# Patient Record
Sex: MALE | Race: WHITE
[De-identification: names, ages, dates, MRNs, and addresses within clinical notes are randomized per-mention and may not be internally consistent; named-entity substitution may affect disease eponyms.]

---

## 2022-08-20 ENCOUNTER — HOSPITAL ENCOUNTER (EMERGENCY)
Dept: HOSPITAL 97 - ER | Age: 67
Discharge: HOME | End: 2022-08-20
Payer: COMMERCIAL

## 2022-08-20 VITALS — SYSTOLIC BLOOD PRESSURE: 118 MMHG | OXYGEN SATURATION: 99 % | DIASTOLIC BLOOD PRESSURE: 54 MMHG

## 2022-08-20 VITALS — TEMPERATURE: 97.1 F

## 2022-08-20 DIAGNOSIS — Z95.5: ICD-10-CM

## 2022-08-20 DIAGNOSIS — I10: ICD-10-CM

## 2022-08-20 DIAGNOSIS — E11.9: ICD-10-CM

## 2022-08-20 DIAGNOSIS — R79.89: ICD-10-CM

## 2022-08-20 DIAGNOSIS — E87.5: ICD-10-CM

## 2022-08-20 DIAGNOSIS — I95.89: Primary | ICD-10-CM

## 2022-08-20 DIAGNOSIS — Z98.890: ICD-10-CM

## 2022-08-20 DIAGNOSIS — Z95.1: ICD-10-CM

## 2022-08-20 DIAGNOSIS — R55: ICD-10-CM

## 2022-08-20 LAB
ALBUMIN SERPL BCP-MCNC: 2.4 G/DL (ref 3.4–5)
ALP SERPL-CCNC: 65 U/L (ref 45–117)
ALT SERPL W P-5'-P-CCNC: 20 U/L (ref 12–78)
AST SERPL W P-5'-P-CCNC: 11 U/L (ref 15–37)
BUN BLD-MCNC: 31 MG/DL (ref 7–18)
GLUCOSE SERPLBLD-MCNC: 96 MG/DL (ref 74–106)
HCT VFR BLD CALC: 25 % (ref 39.6–49)
INR BLD: 1.09
LYMPHOCYTES # SPEC AUTO: 0.8 K/UL (ref 0.7–4.9)
MAGNESIUM SERPL-MCNC: 2 MG/DL (ref 1.8–2.4)
MCV RBC: 83.4 FL (ref 80–100)
NT-PROBNP SERPL-MCNC: 743 PG/ML (ref ?–125)
PMV BLD: 7.6 FL (ref 7.6–11.3)
POTASSIUM SERPL-SCNC: 5.4 MMOL/L (ref 3.5–5.1)
RBC # BLD: 2.99 M/UL (ref 4.33–5.43)
TROPONIN I SERPL HS-MCNC: 11.6 PG/ML (ref ?–58.9)

## 2022-08-20 PROCEDURE — 71045 X-RAY EXAM CHEST 1 VIEW: CPT

## 2022-08-20 PROCEDURE — 99284 EMERGENCY DEPT VISIT MOD MDM: CPT

## 2022-08-20 PROCEDURE — 84145 PROCALCITONIN (PCT): CPT

## 2022-08-20 PROCEDURE — 93005 ELECTROCARDIOGRAM TRACING: CPT

## 2022-08-20 PROCEDURE — 83605 ASSAY OF LACTIC ACID: CPT

## 2022-08-20 PROCEDURE — 80076 HEPATIC FUNCTION PANEL: CPT

## 2022-08-20 PROCEDURE — 81015 MICROSCOPIC EXAM OF URINE: CPT

## 2022-08-20 PROCEDURE — 36415 COLL VENOUS BLD VENIPUNCTURE: CPT

## 2022-08-20 PROCEDURE — 83880 ASSAY OF NATRIURETIC PEPTIDE: CPT

## 2022-08-20 PROCEDURE — 84484 ASSAY OF TROPONIN QUANT: CPT

## 2022-08-20 PROCEDURE — 85025 COMPLETE CBC W/AUTO DIFF WBC: CPT

## 2022-08-20 PROCEDURE — 85610 PROTHROMBIN TIME: CPT

## 2022-08-20 PROCEDURE — 83735 ASSAY OF MAGNESIUM: CPT

## 2022-08-20 PROCEDURE — 81003 URINALYSIS AUTO W/O SCOPE: CPT

## 2022-08-20 PROCEDURE — 80048 BASIC METABOLIC PNL TOTAL CA: CPT

## 2022-08-20 NOTE — RAD REPORT
EXAM DESCRIPTION:  RAD - Chest Single View - 8/20/2022 1:29 pm

 

CLINICAL HISTORY:  syncope, hypotension

 

COMPARISON:  None

 

TECHNIQUE:  AP portable chest image was obtained 8/20/2022 1:29 pm .

 

FINDINGS:  Lungs are clear. Heart and vasculature are normal. CABG surgical changes are noted. Severa
l coronary artery stents are visible. No measurable pleural effusion and no pneumothorax. No acute jannette
ny abnormality seen. No acute aortic findings suspected.

 

IMPRESSION:  No acute cardiopulmonary process.

## 2022-08-20 NOTE — XMS REPORT
Continuity of Care Document

                           Created on:2022



Patient:ROXANNE SOTOMAYOR

Sex:Male

:1955

External Reference #:[id number]





Demographics







                          Address                   [address]



                                                    [place] [address]

 

                          Home Phone                [phone]

 

                          Mobile Phone              [phone]

 

                          Email Address             [email or web address]

 

                          Preferred Language        English

 

                          Marital Status            Unknown

 

                          Judaism Affiliation     Unknown

 

                          Race                      Unknown

 

                          Additional Race(s)        Unavailable



                                                    Other Race

 

                          Ethnic Group              Unknown









Author







                          Organization              [facility]

t

 

                          Address                   [address]



                                                    [place] [address]

 

                          Phone                     [phone]









Support







                Name            Relationship    Address         Phone

 

                KATHRYN LOZOYA               [address]   Unavailable



                                                [place] [phone] 

 

                MIKE MATHUR               Unavailable     [phone]

[phone]

 

                Kathryn Desir               [address]   [phone]

[phone]



                                                [place] [address] 

 

                Unavailable     Unavailable     Unavailable     Unavailable









Care Team Providers







                    Name                Role                Phone

 

                    TYSONMICHAEL HENDRICKS     Primary Care Physician Unavailable

 

                    PASHA WILDER        Attending Clinician Unavailable

 

                    YODIT HORN      Attending Clinician Unavailable

 

                    ELIZABETH ESCALERA Attending Clinician Unavailable

 

                    Bui_Q               Attending Clinician Unavailable

 

                    [email or web address]        Attending Clinician Unavailable

 

                    Blanche Rosenberg MD Attending Clinician [phone]

 

                    PASHA WILDER M.D.  Attending Clinician Unavailable

 

                    CLINT PALOMO M.D. Attending Clinician Unavailable

 

                    DOMO AGUIAR Attending Clinician Unavailable

 

                    CECIL ISIDRO Attending Clinician Unavailable

 

                    ANGEL LOREDO Admitting Clinician Unavailable

 

                    Bui_Q               Admitting Clinician Unavailable

 

                    [id number]        Admitting Clinician Unavailable

 

                    LEELA BERRIOS Admitting Clinician Unavailable

 

                    CORNELIO FRAUSTO      Admitting Clinician Unavailable









Payers







           Payer Name Policy Type Policy Number Effective Date Expiration Date GLADYS whatley

 

           WELLCARE/WELLCARE            [id number]   2020            



           [employer]                        00:00:00              

 

           WELLCARE [facility]            [id number]   2020            



                                            00:00:00              

 

           [facility] NT            [id number] 2022            



                                            00:00:00              

 

           WELLCARE [facility]            [id number]   2020            



           [employer] (MEDICARE                       00:00:00              



           REPLACEMENT/ADVANTA                                             



           GE - HMO)                                              

 

           TRANSACT RX (MOVED            [id number]                           



           HOLD)                                                  

 

           [place]            [id number]   2020            



           [employer]                       00:00:00              



           (MEDICARE                                              



           REPLACEMENT HMO)                                             







Problems







       Condition Condition Condition Status Onset  Resolution Last   Treating Co

mments 

Source



       Name   Details Category        Date   Date   Treatment Clinician        



                                                 Date                 

 

       Effusion Effusion Disease Active                              [facility]



       of knee of knee               6-22                               Health



       joint, joint,               00:00:                             



       left   left                 00                                 

 

       Effusion Effusion Disease Active                              [facility]



       of knee of knee               6-22                               Health



       joint  joint                00:00:                             



       right  right                00                                 

 

       Right hip Right hip Disease Active 2021                             [facility]



       pain   pain                 2-10                               Health



                                   00:00:                             



                                   00                                 

 

       Trochanter Trochanter Disease Active 2021                             [facility]

T



       ic     ic                   2-10                               Health



       bursitis bursitis               00:00:                             



       of right of right               00                                 



       hip    hip                                                     

 

       Primary Primary Disease Active 2021                             [facility]



       osteoarthr osteoarthr               2-10                               He

alth



       itis of itis of               00:00:                             



       right hip right hip               00                                 

 

       Class 1 Class 1 Disease Active 2021                             [facility]



       obesity obesity               2-10                               Health



       due to due to               00:00:                             



       excess excess               00                                 



       calories calories                                                  



       with   with                                                    



       serious serious                                                  



       comorbidit comorbidit                                                  



       y and body y and body                                                  



       mass index mass index                                                  



       (BMI) of (BMI) of                                                  



       34.0 to 34.0 to                                                  



       34.9 in 34.9 in                                                  



       adult  adult                                                   

 

       Primary Primary Disease Active 2021                             [facility]



       osteoarthr osteoarthr               0-15                               He

alth



       itis of itis of               00:00:                             



       both knees both knees               00                                 

 

       Acquired Acquired Disease Active 2021                             [facility]



       varus  varus                0-15                               Health



       deformity deformity               00:00:                             



       knee,  knee,                00                                 



       right  right                                                   

 

       Acquired Acquired Disease Active 2021                             [facility]



       varus  varus                0-15                               Health



       deformity deformity               00:00:                             



       knee, left knee, left               00                                 

 

       Limp   Limp   Disease Active 2021                             [facility]



                                   0-15                               Health



                                   00:00:                             



                                   00                                 

 

       Acute pain Acute pain Disease Active 2021                             [facility]

T



       of left of left               0-15                               Health



       knee   knee                 00:00:                             



                                   00                                 

 

       Dyslipidem Dyslipidem Problem Active                              [facility]

[facility]



       ia     ia                   6-04                               Family



                                   00:00:                             [facility]



                                   00                                 e

 

       Morbid Morbid Problem Active                              [place]



       obesity Obesity               3-18                               Family



                                   00:00:                             [facility]ractic



                                   00                                 e

 

       Diabetes Diabetes Problem Active 2021-0                             [place]

[facility]



       mellitus Mellitus               3-03                               Family



                                   00:00:                             Practic



                                   00                                 e

 

       Angina Angina Problem Active                              [facility]



       co-occurre Co-occurre               1-19                               Fa

solomon



       nt and due nt and Due               00:00:                             Pr

actic



       to     to                   00                                 e



       coronary Coronary                                                  



       arterioscl Arterioscl                                                  



       erosis erosis                                                  

 

       Chronic Chronic Problem Active                              [facility]



       kidney Kidney               9-12                               Family



       disease Disease               00:00:                             [facility]



       stage 3 Stage 3               00                                 e

 

       Acute  Acute  Disease Active                              [facility]



       coronary coronary               8                               [facility]



       syndrome syndrome               00:00:                             [facility]

l



                                   00                                 Center

 

       Cobalamin Cobalamin Problem Active 2019                             [facility]

[place]



       deficiency Deficiency               1-12                               

[facility]



                                   00:00:                             [facility]



                                   00                                 e

 

       Peripheral Peripheral Problem Active 2019                             [facility]

[facility]



       vascular Vascular               1-12                               Family



       disease Disease               00:00:                             [facility]



                                   00                                 e

 

       Type 2 Type 2 Problem Active 2019                             [facility]



       diabetes Diabetes               1-01                               Family



       mellitus Mellitus               00:00:                             [facility]



       with   with                 00                                 e



       peripheral Peripheral                                                  



       angiopathy Angiopathy                                                  

 

       Neuropathy Neuropathy Problem Active                              [facility]

[facility]



       due to Due to               9-10                               Family



       diabetes Diabetes               00:00:                             [facility]



       mellitus Mellitus               00                                 e

 

       Anemia Anemia Problem Active                              [facility]



                                   9-10                               Family



                                   00:00:                             [facility]



                                   00                                 e

 

       Obstructiv Obstructiv Problem Active                              [facility]

[facility]



       e sleep e Sleep               9-10                               Family



       apnea  Apnea                00:00:                             [facility]



       syndrome Syndrome               00                                 e

 

       Hypertensi Hypertensi Problem Active                              [facility]

[facility]



       ve     ve                   9-10                               Family



       disorder Disorder               00:00:                             Pracpj

c



                                   00                                 e

 

       Gastroesop Gastroesop Problem Active                              [facility]

[facility]



       hageal hageal               9-10                               Family



       reflux Reflux               00:00:                             [facility]



       disease Disease               00                                 e



       without without                                                  



       esophagiti Esophagiti                                                  



       s      s                                                       

 

       Lumbar Lumbar Problem Active                              [facility]



       radiculopa Radiculopa               9-10                               

[facility]



       thy    thy                  00:00:                             [facility]



                                   00                                 e

 

       History of History of Problem Active                              [facility]

[facility]



       cerebrovas [facility]               9-10                               [facility]



       cular  cular                00:00:                             [facility]



       accident Accident               00                                 e

 

       Unstable Unstable Disease Active                              [facility]

[facility]



       angina angina               7-06                               [facility]



                                   [facility]                             [facility]



                                   [facility]                                 [facility]

 

       History of History of Problem Resolve                                    

[facility]



       hypertensi hypertensi        d                                         Ph

ysici



       on     on                                                      ans

 

       History of History of Problem Resolve                                    

[facility]



       diabetes diabetes        d                                         Physic

i



       mellitus mellitus                                                  ans

 

       History of History of Problem Resolve                                    

[facility]



       heart  heart         d                                         Physici



       attack attack                                                  ans

 

       Bilateral Bilateral Problem Active                                    [facility]



       knee pain knee pain                                                  Phys

ici



                                                                      ans

 

       Primary Primary Problem Active                                    [facility]



       localized localized                                                  Phys

ici



       osteoarthr osteoarthr                                                  an

s



       itis of itis of                                                  



       knees, knees,                                                  



       bilateral bilateral                                                  

 

       Acquired Acquired Problem Active                                    [facility]



       genu varum genu varum                                                  Ph

ysici



       of both of both                                                  ans



       lower  lower                                                   



       extremitie extremitie                                                  



       s      s                                                       

 

       Acquired Acquired Problem Active                                    [facility]



       genu   genu                                                    Physici



       varum, varum,                                                  ans



       left   left                                                    

 

       Abnormal Abnormal Problem Active                                    [facility]



       accelerati accelerati                                                  Ph

ysici



       ng of gait ng of gait                                                  an

s

 

       Acute pain Acute pain Problem Active                                    [facility]

T



       of left of left                                                  Physici



       knee   knee                                                    ans

 

       Osteoarthr Osteoarthr Problem Active                                    [facility]

[facility]



       itis of itis of                                                  Family



       knee   Knee                                                    [facility]



                                                                      e







Allergies, Adverse Reactions, Alerts







       Allergy Allergy Status Severity Reaction(s) Onset  Inactive Treating Comm

ents 

Source



       Name   Type                        Date   Date   Clinician        

 

       NO KNOWN Drug   Active                                           [facility]



       ALLERGIE Class                                                   ity of



       [facility]

 

       NO KNOWN Allergy Active                                           [facility]



       ALLERGIE                                                         



       S                                                              







Family History







           Family Member Diagnosis  Comments   Start Date Stop Date  Source

 

           Unknown Family Family history of Family History                      

 [facility]



           Member     Heart trouble                                  

 

           Unknown Family Family history of Family History                      

 [facility]



           Member     diabetes mellitus                                  

 

           Unknown Family Family history of Family History                      

 [facility]



           Member     hypertension                                  







Social History







           Social Habit Start Date Stop Date  Quantity   Comments   Source

 

           History [facility]



           Alcohol Std Drinks                                             

 

           History [facility]



           Alcohol Binge                                             

 

           History [facility]



           Alcohol Comment                                             

 

           History of tobacco                       Cigarette Smoker            

[facility]



           use                                                    

 

           Exposure to 2022 Not sure              [facility]



           SARS-CoV-2 (event) 00:00:00   09:55:00                         

 

           Tobacco use and 2022 Smokeless tobacco            [facility]



           exposure   00:00:00   00:00:00   non-user              

 

           Alcohol intake 2022 Lifetime              [facility]



                      00:00:00   00:00:00   non-drinker            



                                            (finding)             

 

           Cigarette  2022                       [facility]



           pack-years 00:00:00   00:00:00                         

 

           History SDOH 2021-10-15 2021-10-15 1                     [facility]



           Alcohol Frequency 00:00:00   00:00:00                         

 

           Sex Assigned At 1955                       [facility]



           Birth      00:00:00   00:00:00                         









                Smoking Status  Start Date      Stop Date       Source

 

                [facility] if ever smoked                                 [facility]

 

                Never smoked tobacco                                 [facility]







Medications







       Ordered Filled Start  Stop   Current Ordering Indication Dosage Frequency

 Signature

                    Comments            Components          Source



     Medication Medication Date Date Medication? Clinician                (SIG) 

          



     Name Name                                                   

 

     bupivacaine      2021- No        [id number] 1mL                    

  [facility]



     (Marcaine)      2-10 12-10           9100                          Health



     0.25 %      19:22: 19:22                                         



     injection 1      48   :00                                          



     mL                                                          

 

     lidocaine      2021- No        [id number] 1mL                      

[facility]



     (Xylocaine)      2-10 12-10           9100                          Health



     1 %       19:22: 19:22                                         



     injection 1      48   :00                                          



     mL                                                          

 

     triamcinolo      2021- No        [id number] 10mg                   

  [facility]



     ne        2-10 12-10           9100                          Health



     acetonide      19:22: 19:22                                         



     (Kenalog)      48   :00                                          



     10 MG/ML                                                        



     injection                                                        



     10 mg                                                        

 

     triamcinolo      2021- No        [id number] 10mg      10 mg,       

    [facility]



     ne        2-10 12-10           9100           Intra-mya           Health



     acetonide      19:22: 19:22                          cular,           



     (Kenalog)      48   :00                           Once PRN           



     10 MG/ML                                         Procedure,           



     injection                                         Starting           



     10 mg                                         on Fri           



                                                  12/10/21           



                                                  at 1322,           



                                                  For 1 dose           

 

     lidocaine      2021- No        [id number] 1mL       1 mL,          

 [facility]



     (Xylocaine)      2-10 12-10           9100           Injection,           H

ealth



     1 %       19:22: 19:22                          Once PRN           



     injection 1      48   :00                           Procedure,           



     mL                                           Starting           



                                                  on Fri           



                                                  12/10/21           



                                                  at 1322,           



                                                  For 1 dose           

 

     bupivacaine      2021- No        [id number] 1mL       1 mL,        

   [facility]



     (Marcaine)      2-10 12-10           9100           Injection,           [facility]

[facility]



     0.25 %      19:22: 19:22                          Once PRN           



     injection 1      48   :00                           Procedure,           



     mL                                           Starting           



                                                  on Fri           



                                                  12/10/21           



                                                  at 1322,           



                                                  For 1 dose           

 

     Diclofenac      2021- No        [id number]      [id number] Apply   

        [facility]



     Sodium      2-10 01-10           9102      [id number] topically           [facility]

e[facility]



     (Voltaren)      00:00: 05:59                     3D   3 (three)           



     1 %       00   :00                           times a           



     external                                         day if           



     gel                                          needed           



                                                  (pain).           



                                                  APPLY 4           



                                                  GRAMS DONT           



                                                  EXCEED 16           



                                                  QD             

 

     Sodium      2021- No        [id number] 20mg                     [facility]



     Hyaluronate                                               [facility]



     solution      15:38: 15:38                                         



     prefilled      48   :00                                          



     syringe 20                                                        



     mg                                                          

 

     Sodium      2021- No        [id number] 20mg                     [facility]



     Hyaluronate                                               [facility]



     solution      15:38: 15:38                                         



     prefilled      48   :00                                          



     syringe 20                                                        



     mg                                                          

 

     Sodium      2021- No        [id number] 20mg      20 mg,           [facility]



     Hyaluronate                                Intra-mya           [facility]

ealth



     solution      15:38: 15:38                          cular,           



     prefilled      48   :00                           Once PRN           



     syringe 20                                         Procedure,           



     mg                                           Starting           



                                                  on 21           



                                                  at 0938,           



                                                  For 1 dose           

 

     Sodium      2021- No        [id number] 20mg      20 mg,           [facility]



     Hyaluronate                                Intra-mya           [facility]

ealth



     solution      15:38: 15:38                          cular,           



     prefilled      48   :00                           Once PRN           



     syringe 20                                         Procedure,           



     mg                                           Starting           



                                                  on 21           



                                                  at 0938,           



                                                  For 1 dose           

 

     Sodium      2021- No        [id number] 20mg                     [facility]



     Hyaluronate                                               Health



     solution      15:15: 15:15                                         



     prefilled      27   :00                                          



     syringe 20                                                        



     mg                                                          

 

     Sodium      2021- No        [id number] 20mg                     [facility]



     Hyaluronate                                               [facility]



     solution      15:15: 15:15                                         



     prefilled      27   :00                                          



     syringe 20                                                        



     mg                                                          

 

     Sodium      2021- No        [id number] 20mg      20 mg,           [facility]



     Hyaluronate                                Intra-mya           [facility]

e[facility]



     solution      15:15: 15:15                          cular,           



     prefilled      27   :00                           Once PRN           



     syringe 20                                         Procedure,           



     mg                                           Starting           



                                                  on 21           



                                                  at 0915,           



                                                  For 1 dose           

 

     Sodium      2021- No        [id number] 20mg      20 mg,           [facility]



     Hyaluronate                                Intra-mya           [facility]

e[facility]



     solution      15:15: 15:15                          cular,           



     prefilled      27   :00                           Once PRN           



     syringe 20                                         Procedure,           



     mg                                           Starting           



                                                  on 21           



                                                  at 0915,           



                                                  For 1 dose           

 

     Sodium      2021- No        [id number] 20mg                     [facility]



     Hyaluronate                                               Health



     solution      14:39: 14:39                                         



     prefilled      41   :00                                          



     syringe 20                                                        



     mg                                                          

 

     Sodium      2021- No        [id number] 20mg                     [facility]



     Hyaluronate                                               Health



     solution      14:39: 14:39                                         



     prefilled      41   :00                                          



     syringe 20                                                        



     mg                                                          

 

     Sodium      2021- No        [id number] 20mg      20 mg,           [facility]



     Hyaluronate                                Intra-mya           [facility]

e[facility]



     solution      14:39: 14:39                          cular,           



     prefilled      41   :00                           Once PRN           



     syringe 20                                         Procedure,           



     mg                                           Starting           



                                                  on 21 at           



                                                  0939, For           



                                                  1 dose           

 

     Sodium      2021- No        [id number] 20mg      20 mg,           [facility]



     Hyaluronate                                Intra-mya           [facility]

e[facility]



     solution      14:39: 14:39                          cular,           



     prefilled      41   :00                           Once PRN           



     syringe 20                                         Procedure,           



     mg                                           Starting           



                                                  on 21 at           



                                                  0939, For           



                                                  1 dose           

 

     pregabalin      2021      Yes                      pregabalin           [facility]

[facility]



     (Lyrica) 50      0-15                               50 mg           Health



     MG capsule      09:07:                               capsule TK           



               56                                 1 C PO QD.           

 

     sAXagliptin      2021      Yes                 QD   1 (one)           [facility]



     (Onglyza) 5      0-15                               time each           [facility]

[facility]



     MG tablet      09:07:                               day.           



               56                                                

 

     pregabalin      2021      Yes                      pregabalin           [facility]

T



     (Lyrica) 50      0-15                               50 mg           [facility]



     MG capsule      09:07:                               capsule TK           



               56                                 1 C PO QD.           

 

     sAXagliptin      2021      Yes                 QD   1 (one)           [facility]



     (Onglyza) 5      0-15                               time each           [facility]

[facility]



     MG tablet      09:07:                               day.           



               56                                                

 

     pregabalin      2021      Yes                      pregabalin           [facility]

T



     (Lyrica) 50      0-15                               50 mg           [facility]



     MG capsule      09:07:                               capsule TK           



               56                                 1 C PO QD.           

 

     sAXagliptin      2021      Yes                 QD   1 (one)           [facility]



     (Onglyza) 5      0-15                               time each           [facility]

[facility]



     MG tablet      09:07:                               day.           



               56                                                

 

     pregabalin      2021      Yes                      pregabalin           [facility]

T



     (Lyrica) 50      0-15                               50 mg           [facility]



     MG capsule      09:07:                               capsule TK           



               56                                 1 C PO QD.           

 

     sAXagliptin      2021      Yes                 QD   1 (one)           [facility]



     (Onglyza) 5      0-15                               time each           [facility]

[facility]



     MG tablet      09:07:                               day.           



               56                                                

 

     pregabalin      2021      Yes                      pregabalin           [facility]

T



     (Lyrica) 50      0-15                               50 mg           [facility]



     MG capsule      09:07:                               capsule TK           



               56                                 1 C PO QD.           

 

     sAXagliptin      2021      Yes                 QD   1 (one)           [facility]



     (Onglyza) 5      0-15                               time each           [facility]

[facility]



     MG tablet      09:07:                               day.           



               56                                                

 

     pregabalin      2021      Yes                      pregabalin           [facility]

T



     (Lyrica) 50      0-15                               50 mg           [facility]



     MG capsule      09:07:                               capsule TK           



               56                                 1 C PO QD.           

 

     sAXagliptin      2021      Yes                 QD   1 (one)           [facility]



     (Onglyza) 5      0-15                               time each           [facility]

[facility]



     MG tablet      09:07:                               day.           



               56                                                

 

     pregabalin      2021      Yes                      pregabalin           [facility]

T



     (Lyrica) 50      0-15                               50 mg           [facility]



     MG capsule      09:07:                               capsule TK           



               56                                 1 C PO QD.           

 

     sAXagliptin      2021      Yes                 QD   1 (one)           [facility]



     (Onglyza) 5      0-15                               time each           [facility]

[facility]



     MG tablet      09:07:                               day.           



               56                                                

 

     empaglifloz      2021      Yes                 QD   1 (one)           [facility]



     in        0-15                               time each           [facility]



     (Jardiance)      09:07:                               day.           



     25 MG      55                                                

 

     glipiZIDE      2021      Yes                      glipizide           [facility]



     XL        0-15                               ER 10 mg           [facility]



     (Glucotrol      09:07:                               tablet,           



     XL) 10 MG      55                                 extended           



     24 hr                                         release 24           



     tablet                                         hr Take 1           



                                                  tablet           



                                                  every day           



                                                  by oral           



                                                  route.           

 

     insulin      2021      Yes                      Lantus           [facility]



     glargine      0-15                               [facility]ar           [facility]



     (Lantus      09:07:                               U-100           



     SoloStar)      55                                 Insulin           



     100 UNIT/ML                                         100            



     injection                                         unit/mL (3           



                                                  mL)            



                                                  subcutaneo           



                                                  us pen per           



                                                  endo 50           



                                                  units           



                                                  twice a           



                                                  day            

 

     isosorbide      2021      Yes                      isosorbide           [facility]

T



     mononitrate      0-15                               mononitrat           [facility]

[facility]



     ER (Imdur)      09:07:                               e ER 30 mg           



     30 MG 24 hr      55                                 tablet,ext           



     tablet                                         ended           



                                                  release 24           



                                                  hr             

 

     metFORMIN      2021      Yes                      metformin           [facility]



     (Glucophage      0-15                               1,000 mg           [facility]

[facility]



     ) 1000 MG      09:07:                               tablet           



     tablet      55                                                

 

     empaglifloz      2021      Yes                 QD   1 (one)           [facility]



     in        0-15                               time each           [facility]



     (Jardiance)      09:07:                               day.           



     25 MG      55                                                

 

     glipiZIDE      2021      Yes                      glipizide           [facility]



     XL        0-15                               ER 10 mg           [facility]



     (Glucotrol      09:07:                               tablet,           



     XL) 10 MG      55                                 extended           



     24 hr                                         release 24           



     tablet                                         hr Take 1           



                                                  tablet           



                                                  every day           



                                                  by oral           



                                                  route.           

 

     insulin      2021      Yes                      Lantus           [facility]



     glargine      0-15                               [facility]           [facility]



     (Lantus      09:07:                               U-100           



     SoloStar)      55                                 Insulin           



     100 UNIT/ML                                         100            



     injection                                         unit/mL (3           



                                                  mL)            



                                                  subcutaneo           



                                                  us pen per           



                                                  endo 50           



                                                  units           



                                                  twice a           



                                                  day            

 

     isosorbide      2021      Yes                      isosorbide           [facility]

T



     mononitrate      0-15                               mononitrat           [facility]

[facility]



     ER (Imdur)      09:07:                               e ER 30 mg           



     30 MG 24 hr      55                                 tablet,ext           



     tablet                                         ended           



                                                  release 24           



                                                  hr             

 

     metFORMIN      2021      Yes                      metformin           [facility]



     (Glucophage      0-15                               1,000 mg           [facility]

[facility]



     ) 1000 MG      09:07:                               tablet           



     tablet      55                                                

 

     empaglifloz      2021      Yes                 QD   1 (one)           [facility]



     in        0-15                               time each           [facility]



     (Jardiance)      09:07:                               day.           



     25 MG      55                                                

 

     glipiZIDE      2021      Yes                      glipizide           [facility]



     XL        0-15                               ER 10 mg           [facility]



     (Glucotrol      09:07:                               tablet,           



     XL) 10 MG      55                                 extended           



     24 hr                                         release 24           



     tablet                                         hr Take 1           



                                                  tablet           



                                                  every day           



                                                  by oral           



                                                  route.           

 

     insulin      2021      Yes                      Lantus           [facility]



     glargine      0-15                               [facility]           [facility]



     (Lantus      09:07:                               U-100           



     SoloStar)      55                                 Insulin           



     100 UNIT/ML                                         100            



     injection                                         unit/mL (3           



                                                  mL)            



                                                  subcutaneo           



                                                  us pen per           



                                                  endo 50           



                                                  units           



                                                  twice a           



                                                  day            

 

     isosorbide      2021      Yes                      isosorbide           [facility]

T



     mononitrate      0-15                               mononitrat           [facility]

[facility]



     ER (Imdur)      09:07:                               e ER 30 mg           



     30 MG 24 hr      55                                 tablet,ext           



     tablet                                         ended           



                                                  release 24           



                                                  hr             

 

     metFORMIN      2021      Yes                      metformin           [facility]



     (Glucophage      0-15                               1,000 mg           [facility]

[facility]



     ) 1000 MG      09:07:                               tablet           



     tablet      55                                                

 

     empaglifloz      2021      Yes                 QD   1 (one)           [facility]



     in        0-15                               time each           [facility]



     (Jardiance)      09:07:                               day.           



     25 MG      55                                                

 

     glipiZIDE      2021      Yes                      glipizide           [facility]



     XL        0-15                               ER 10 mg           [facility]



     (Glucotrol      09:07:                               tablet,           



     XL) 10 MG      55                                 extended           



     24 hr                                         release 24           



     tablet                                         hr Take 1           



                                                  tablet           



                                                  every day           



                                                  by oral           



                                                  route.           

 

     insulin      2021      Yes                      Lantus           [facility]



     glargine      0-15                               [facility]           [facility]



     (Lantus      09:07:                               U-100           



     SoloStar)      55                                 Insulin           



     100 UNIT/ML                                         100            



     injection                                         unit/mL (3           



                                                  mL)            



                                                  subcutaneo           



                                                  us pen per           



                                                  endo 50           



                                                  units           



                                                  twice a           



                                                  day            

 

     isosorbide      2021      Yes                      isosorbide           [facility]

T



     mononitrate      0-15                               mononitrat           [facility]

[facility]



     ER (Imdur)      09:07:                               e ER 30 mg           



     30 MG 24 hr      55                                 tablet,ext           



     tablet                                         ended           



                                                  release 24           



                                                  hr             

 

     metFORMIN      2021      Yes                      metformin           [facility]



     (Glucophage      0-15                               1,000 mg           [facility]

[facility]



     ) 1000 MG      09:07:                               tablet           



     tablet      55                                                

 

     empaglifloz      2021      Yes                 QD   1 (one)           [facility]



     in        0-15                               time each           [facility]



     (Jardiance)      09:07:                               day.           



     25 MG      55                                                

 

     glipiZIDE      2021      Yes                      glipizide           [facility]



     XL        0-15                               ER 10 mg           [facility]



     (Glucotrol      09:07:                               tablet,           



     XL) 10 MG      55                                 extended           



     24 hr                                         release 24           



     tablet                                         hr Take 1           



                                                  tablet           



                                                  every day           



                                                  by oral           



                                                  route.           

 

     insulin      2021      Yes                      Lantus           [facility]



     glargine      0-15                               [facility]ar           [facility]



     (Lantus      09:07:                               U-100           



     SoloStar)      55                                 Insulin           



     100 UNIT/ML                                         100            



     injection                                         unit/mL (3           



                                                  mL)            



                                                  subcutaneo           



                                                  us pen per           



                                                  endo 50           



                                                  units           



                                                  twice a           



                                                  day            

 

     isosorbide      2021      Yes                      isosorbide           [facility]

T



     mononitrate      0-15                               mononitrat           [facility]

[facility]



     ER (Imdur)      09:07:                               e ER 30 mg           



     30 MG 24 hr      55                                 tablet,ext           



     tablet                                         ended           



                                                  release 24           



                                                  hr             

 

     metFORMIN      2021      Yes                      metformin           [facility]



     (Glucophage      0-15                               1,000 mg           [facility]

[facility]



     ) 1000 MG      09:07:                               tablet           



     tablet      55                                                

 

     empaglifloz      2021      Yes                 QD   1 (one)           [facility]



     in        0-15                               time each           [facility]



     (Jardiance)      09:07:                               day.           



     25 MG      55                                                

 

     glipiZIDE      2021      Yes                      glipizide           [facility]



     XL        0-15                               ER 10 mg           [facility]



     (Glucotrol      09:07:                               tablet,           



     XL) 10 MG      55                                 extended           



     24 hr                                         release 24           



     tablet                                         hr Take 1           



                                                  tablet           



                                                  every day           



                                                  by oral           



                                                  route.           

 

     insulin      2021      Yes                      Lantus           [facility]



     glargine      0-15                               [facility]           [facility]



     (Lantus      09:07:                               U-100           



     SoloStar)      55                                 Insulin           



     100 UNIT/ML                                         100            



     injection                                         unit/mL (3           



                                                  mL)            



                                                  subcutaneo           



                                                  us pen per           



                                                  endo 50           



                                                  units           



                                                  twice a           



                                                  day            

 

     isosorbide      2021      Yes                      isosorbide           [facility]

T



     mononitrate      0-15                               mononitrat           [facility]

[facility]



     ER (Imdur)      09:07:                               e ER 30 mg           



     30 MG 24 hr      55                                 tablet,ext           



     tablet                                         ended           



                                                  release 24           



                                                  hr             

 

     metFORMIN      2021      Yes                      metformin           [facility]



     (Glucophage      0-15                               1,000 mg           [facility]



     ) 1000 MG      09:07:                               tablet           



     tablet      55                                                

 

     empaglifloz      2021      Yes                 QD   1 (one)           [facility]



     in        0-15                               time each           [facility]



     (Jardiance)      09:07:                               day.           



     25 MG      55                                                

 

     glipiZIDE      2021      Yes                      glipizide           [facility]



     XL        0-15                               ER 10 mg           [facility]



     (Glucotrol      09:07:                               tablet,           



     XL) 10 MG      55                                 extended           



     24 hr                                         release 24           



     tablet                                         hr Take 1           



                                                  tablet           



                                                  every day           



                                                  by oral           



                                                  route.           

 

     insulin      2021      Yes                      Lantus           [facility]



     glargine      0-15                               [facility]           [facility]



     (Lantus      09:07:                               U-100           



     SoloStar)      55                                 Insulin           



     100 UNIT/ML                                         100            



     injection                                         unit/mL (3           



                                                  mL)            



                                                  subcutaneo           



                                                  us pen per           



                                                  endo 50           



                                                  units           



                                                  twice a           



                                                  day            

 

     isosorbide      2021      Yes                      isosorbide           [facility]

T



     mononitrate      0-15                               mononitrat           [facility]

[facility]



     ER (Imdur)      09:07:                               e ER 30 mg           



     30 MG 24 hr      55                                 tablet,ext           



     tablet                                         ended           



                                                  release 24           



                                                  hr             

 

     metFORMIN      2021      Yes                      metformin           [facility]



     (Glucophage      0-15                               1,000 mg           [facility]



     ) 1000 MG      09:07:                               tablet           



     tablet      55                                                

 

     allopurinol      2021      Yes                      1 (one)           [facility]



     (Zyloprim)      0-15                               time each           [facility]

[facility]



     100 MG      09:07:                               day at the           



     tablet      54                                 same time.           

 

     atorvastati      2021      Yes                      atorvastat           

[facility]



     n (Lipitor)      0-15                               in 80 mg           [facility]

[facility]



     80 MG      09:07:                               tablet           



     tablet      54                                                

 

     allopurinol      2021      Yes                      1 (one)           [facility]



     (Zyloprim)      0-15                               time each           [facility]

[facility]



     100 MG      09:07:                               day at the           



     tablet      54                                 same time.           

 

     atorvastati      2021      Yes                      atorvastat           

[facility]



     n (Lipitor)      0-15                               in 80 mg           [facility]



     80 MG      09:07:                               tablet           



     tablet      54                                                

 

     allopurinol      2021      Yes                      1 (one)           [facility]



     (Zyloprim)      0-15                               time each           [facility]

[facility]



     100 MG      09:07:                               day at the           



     tablet      54                                 same time.           

 

     atorvastati      2021      Yes                      atorvastat           

[facility]



     n (Lipitor)      0-15                               in 80 mg           [facility]



     80 MG      09:07:                               tablet           



     tablet      54                                                

 

     allopurinol      2021      Yes                      1 (one)           [facility]



     (Zyloprim)      0-15                               time each           [facility]



     100 MG      09:07:                               day at the           



     tablet      54                                 same time.           

 

     atorvastati      2021      Yes                      atorvastat           

[facility]



     n (Lipitor)      0-15                               in 80 mg           [facility]



     80 MG      09:07:                               tablet           



     tablet      54                                                

 

     allopurinol      2021      Yes                      1 (one)           [facility]



     (Zyloprim)      0-15                               time each           [facility]



     100 MG      09:07:                               day at the           



     tablet      54                                 same time.           

 

     atorvastati      2021      Yes                      atorvastat           

[facility]



     n (Lipitor)      0-15                               in 80 mg           [facility]



     80 MG      09:07:                               tablet           



     tablet      54                                                

 

     allopurinol      2021      Yes                      1 (one)           [facility]



     (Zyloprim)      0-15                               time each           [facility]

[facility]



     100 MG      09:07:                               day at the           



     tablet      54                                 same time.           

 

     atorvastati      2021      Yes                      atorvastat           

[facility]



     n (Lipitor)      0-15                               in 80 mg           [facility]

[facility]



     80 MG      09:07:                               tablet           



     tablet      54                                                

 

     allopurinol      2021      Yes                      1 (one)           [facility]



     (Zyloprim)      0-15                               time each           [facility]

[facility]



     100 MG      09:07:                               day at the           



     tablet      54                                 same time.           

 

     atorvastati      2021      Yes                      atorvastat           

[facility]



     n (Lipitor)      0-15                               in 80 mg           [facility]

[facility]



     80 MG      09:07:                               tablet           



     tablet      54                                                

 

     Diclofenac      2021- No        [id number]      [id number] Apply    

       [facility]



     Sodium      0-15 11-15                     [id number] topically           [facility]

[facility]



     (Voltaren)      00:00: 05:59                     3D   3 (three)           



     1 %       00   :00                           times a           



     external                                         day if           



     gel                                          needed           



                                                  (pain).           



                                                  APPLY 4           



                                                  GRAMS TO           



                                                  AFFECTED           



                                                  AREA NOT           



                                                  TO EXCEED           



                                                  16 GRAMS           



                                                  QS             

 

     Diclofenac      2021- No        [id number]      [id number] Apply    

       [facility]



     Sodium      0-15 11-15                     [id number] topically           [facility]

[facility]



     (Voltaren)      00:00: 05:59                     3D   3 (three)           



     1 %       00   :00                           times a           



     external                                         day if           



     gel                                          needed           



                                                  (pain).           



                                                  APPLY 4           



                                                  GRAMS TO           



                                                  AFFECTED           



                                                  AREA NOT           



                                                  TO EXCEED           



                                                  16 GRAMS           



                                                  QS             

 

     Diclofenac      2021- No        [id number]      [id number] Apply    

       [facility]



     Sodium      0-15 11-15                     [id number] topically           [facility]

[facility]



     (Voltaren)      00:00: 05:59                     3D   3 (three)           



     1 %       00   :00                           times a           



     external                                         day if           



     gel                                          needed           



                                                  (pain).           



                                                  APPLY 4           



                                                  GRAMS TO           



                                                  AFFECTED           



                                                  AREA NOT           



                                                  TO EXCEED           



                                                  16 GRAMS           



                                                  QS             

 

     hydrocortis      2021- No        [id number] 4[drp]      Place [facility]    

       [facility]



     one-acetic      4-08 14           [id number]           Drops in           it

y of



     acid 1-2 %      00:00: 04:59                          left ear 3           

[place]



     otic drops      00   :00                           (three)           [facility]

[facility]



                                                  times           [facility]



                                                  daily for           



                                                  5 days.           

 

     Diclofenac Diclofenac       Yes  PASHA                APPLY 4        

   [facility]



     Sodium 1 % Sodium 1 % 3-05           WILDER M.D.                GRAMS TO    

       [facility]



     External External 00:00:                               AFFECTED           a

ns



     Gel  Gel  00                                 AREA           



                                                  (LOWER           



                                                  EXTREMITIE           



                                                  S) FOUR           



                                                  TIMES A           



                                                  DAY. DO           



                                                  NOT APPLY           



                                                  MORE THAN           



                                                  16 GRAMS           



                                                  DAILY           

 

     cyclobenzap      2020-1      Yes                                     [facility]



     rine      2-08                                              [facility]



     (Flexeril)      00:00:                                              



     10 MG      00                                                



     tablet                                                        

 

     cyclobenzap      2020-1      Yes                                     [facility]



     rine      2-08                                              [facility]



     (Flexeril)      00:00:                                              



     10 MG      00                                                



     tablet                                                        

 

     cyclobenzap      2020-1      Yes                                     [facility]



     rine      2-08                                              [facility]



     (Flexeril)      00:00:                                              



     10 MG      00                                                



     tablet                                                        

 

     cyclobenzap      2020-1      Yes                                     [facility]



     rine      2-08                                              [facility]



     (Flexeril)      00:00:                                              



     10 MG      00                                                



     tablet                                                        

 

     cyclobenzap      2020-1      Yes                                     [facility]



     rine      2-08                                              [facility]



     (Flexeril)      00:00:                                              



     10 MG      00                                                



     tablet                                                        

 

     cyclobenzap      2020-1      Yes                                     [facility]



     rine      2-08                                              [facility]



     (Flexeril)      00:00:                                              



     10 MG      00                                                



     tablet                                                        

 

     cyclobenzap      2020-1      Yes                                     [facility]



     rine      2-08                                              [facility]



     (Flexeril)      00:00:                                              



     10 MG      00                                                



     tablet                                                        

 

     aspirin 81      -0      Yes            81mg QD   Take 81 mg           [facility]

[facility]



     MG EC      8-24                               by mouth           [facility]



     tablet      13:34:                               daily.           [address]

 

     atorvastati      -0      Yes            80mg QD   Take 80 mg           

[facility]



     n calcium      8-24                               by mouth           [facility]



     (ATORVASTAT      13:34:                               daily .           [facility]

ical



     IN ORAL)      [facility]                                                [facility]

 

     CARVEDILOL      2020-0      Yes            25mg QD   Take 25 mg           [facility]

[facility]



     ORAL      8-24                               by mouth           [facility]



               13:34:                               daily .           [facility]



               11                                                Center

 

     losartan      2020-0      Yes            100mg      Take 100           [facility]



     potassium      8-24                               mg by           [facility]



     (LOSARTAN      13:34:                               mouth .           [facility]

al



     ORAL)      11                                                Center

 

     pantoprazol      2020-0      Yes            40mg QD   Take 40 mg           

[facility]



     e         8-24                               by mouth           [facility]



     (PROTONIX)      13:34:                               daily.           [facility]

al



     40 MG      11                                                Center



     tablet                                                        

 

     traMADol      2020-0      Yes            50mg      Take 50 mg           [facility]



     (ULTRAM) 50      8-24                               by mouth           [facility]

[facility]



     mg tablet      13:34:                               every 6           [facility]

al



               11                                 (six)           Center



                                                  hours as           



                                                  needed for           



                                                  Pain.           

 

     duloxetine      2020-0      Yes            30mg QD   Take 30 mg           [facility]

[facility]



     HCl       8-24                               by mouth           [facility]



     (DULOXETINE      13:34:                               daily .           Med

ical



     ORAL)      11                                                Center

 

     spironolact      2020-0      Yes            25mg QD   Take 25 mg           

[facility]



     one       8-24                               by mouth           [facility]



     (ALDACTONE)      13:34:                               daily.           [facility]

mel[facility]



     25 MG      11                                                Center



     tablet                                                        

 

     omega-3      2020-0      Yes            2g   Q.5D Take 2 g           [facility]



     fatty      8-24                               by mouth 2           [facility]



     acids-fish      13:34:                               (two)           [facility]

[facility]



     oil       11                                 times           Center



     340-1,000                                         daily.           



     mg Cap per                                                        



     capsule                                                        

 

     folic acid      2020-0      Yes            1mg  QD   Take 1 mg           [facility]

[facility]



     (FOLVITE) 1      8-24                               by mouth           [facility]

[facility]



     MG tablet      13:34:                               daily.           [facility]

[facility]



               11                                                Center

 

     nitroglycer      2020-0      Yes            .4mg      Place 0.4           [facility]

[facility]



     in        8-24                               mg under           [facility]



     (NITROSTAT)      13:34:                               the tongue           

[facility]



     0.4 MG SL      11                                 every 5           Center



     tablet                                         (five)           



                                                  minutes as           



                                                  needed for           



                                                  Chest pain           



                                                  Put 1 pill           



                                                  under           



                                                  tongue           



                                                  every 5min           



                                                  as needed           



                                                  for chest           



                                                  pain.No           



                                                  more than           



                                                  3 doses in           



                                                  15min.Call           



                                                  911 if           



                                                  pain is           



                                                  unrelieved           



                                                  5min after           



                                                  1st dose .           

 

     ranolazine      2020-0      Yes            500mg Q.5D Take 500           [facility]

[facility]



     (RANEXA)      8-24                               mg by           [facility]



     500 MG 12      13:34:                               mouth 2           [facility]

al



     hr tablet      11                                 (two)           Center



                                                  times           



                                                  daily.           

 

     ticagrelor      2020-0      Yes            90mg Q.5D Take 90 mg           [facility]

[facility]



     (BRILINTA)      8-24                               by mouth 2           [facility]

[facility]



     90 mg Tab      13:34:                               (two)           [facility]



     tablet      11                                 times           Center



                                                  daily.           

 

     traMADol      2020-0      Yes            50mg      Take 50 mg           [facility]



     (Ultram) 50      8-24                               by mouth.           [facility]

[facility]



     MG tablet      00:00:                                              



               00                                                

 

     carvedilol      2020-0      Yes            25mg QD   Take 25 mg           [facility]

[facility]



     (Coreg) 1      8-24                               by mouth 1           [facility]

[facility]



     mg/mL      00:00:                               (one) time           



     solution      00                                 each day.           

 

     aspirin 81      2020-0      Yes                 QD   1 (one)           [facility]



     MG EC      8-24                               time each           [facility]



     tablet      00:00:                               day.           



               00                                                

 

     insulin      2020-0      Yes            30U  Q12H Inject 30           [facility]



     glargine      8-24                               Units           [facility]



     (Lantus)      00:00:                               under the           



     100 UNIT/ML      00                                 skin every           



     injection                                         12             



                                                  (twelve)           



                                                  hours.           

 

     pantoprazol      2020-0      Yes            40mg QD   Take 40 mg           

[facility]



     e         8-24                               by mouth 1           [facility]



     (ProtoNix)      00:00:                               (one) time           



     40 MG EC      00                                 each day.           



     tablet                                                        

 

     ticagrelor      2020-0      Yes            90mg Q12H Take 90 mg           [facility]

[facility]



     (Brilinta)      8-24                               by mouth           [facility]

[facility]



     90 MG      00:00:                               every 12           



     tablet      00                                 (twelve)           



                                                  hours.           

 

     traMADol      2020-0      Yes            50mg      Take 50 mg           [facility]



     (Ultram) 50      8-24                               by mouth.           [facility]



     MG tablet      00:00:                                              



               00                                                

 

     carvedilol      2020-0      Yes            25mg QD   Take 25 mg           [facility]

T



     (Coreg) 1      8-24                               by mouth 1           [facility]

[facility]



     mg/mL      00:00:                               (one) time           



     solution      00                                 each day.           

 

     aspirin 81      2020-0      Yes                 QD   1 (one)           [facility]



     MG EC      8-24                               time each           [facility]



     tablet      00:00:                               day.           



               00                                                

 

     insulin      2020-0      Yes            30U  Q12H Inject 30           [facility]



     glargine      8-24                               Units           [facility]



     (Lantus)      00:00:                               under the           



     100 UNIT/ML      00                                 skin every           



     injection                                         12             



                                                  (twelve)           



                                                  hours.           

 

     pantoprazol      2020-0      Yes            40mg QD   Take 40 mg           

[facility]



     e         8-24                               by mouth 1           [facility]



     (ProtoNix)      00:00:                               (one) time           



     40 MG EC      00                                 each day.           



     tablet                                                        

 

     ticagrelor      2020-0      Yes            90mg Q12H Take 90 mg           [facility]

[facility]



     (Brilinta)      8-24                               by mouth           [facility]

[facility]



     90 MG      00:00:                               every 12           



     tablet      00                                 (twelve)           



                                                  hours.           

 

     traMADol      2020-0      Yes            50mg      Take 50 mg           [facility]



     (Ultram) 50      8-24                               by mouth.           [facility]



     MG tablet      00:00:                                              



               00                                                

 

     carvedilol      2020-0      Yes            25mg QD   Take 25 mg           [facility]

[facility]



     (Coreg) 1      8-24                               by mouth 1           [facility]

[facility]



     mg/mL      00:00:                               (one) time           



     solution      00                                 each day.           

 

     aspirin 81      2020-0      Yes                 QD   1 (one)           [facility]



     MG EC      8-24                               time each           [facility]



     tablet      00:00:                               day.           



               00                                                

 

     insulin      2020-0      Yes            30U  Q12H Inject 30           [facility]



     glargine      8-24                               Units           [facility]



     (Lantus)      00:00:                               under the           



     100 UNIT/ML      00                                 skin every           



     injection                                         12             



                                                  (twelve)           



                                                  hours.           

 

     pantoprazol      2020-0      Yes            40mg QD   Take 40 mg           

[facility]



     e         8-24                               by mouth 1           [facility]



     (ProtoNix)      00:00:                               (one) time           



     40 MG EC      00                                 each day.           



     tablet                                                        

 

     ticagrelor      2020-0      Yes            90mg Q12H Take 90 mg           [facility]

T



     (Brilinta)      8-24                               by mouth           [facility]

[facility]



     90 MG      00:00:                               every 12           



     tablet      00                                 (twelve)           



                                                  hours.           

 

     traMADol      2020-0      Yes            50mg      Take 50 mg           [facility]



     (Ultram) 50      8-24                               by mouth.           [facility]



     MG tablet      00:00:                                              



               00                                                

 

     carvedilol      2020-0      Yes            25mg QD   Take 25 mg           [facility]

[facility]



     (Coreg) 1      8-24                               by mouth 1           [facility]

[facility]



     mg/mL      00:00:                               (one) time           



     solution      00                                 each day.           

 

     aspirin 81      2020-0      Yes                 QD   1 (one)           [facility]



     MG EC      8-24                               time each           [facility]



     tablet      00:00:                               day.           



               00                                                

 

     insulin      2020-0      Yes            30U  Q12H Inject 30           [facility]



     glargine      8-24                               Units           [facility]



     (Lantus)      00:00:                               under the           



     100 UNIT/ML      00                                 skin every           



     injection                                         12             



                                                  (twelve)           



                                                  hours.           

 

     pantoprazol      2020-0      Yes            40mg QD   Take 40 mg           

[facility]



     e         8-24                               by mouth 1           [facility]



     (ProtoNix)      00:00:                               (one) time           



     40 MG EC      00                                 each day.           



     tablet                                                        

 

     ticagrelor      2020-0      Yes            90mg Q12H Take 90 mg           [facility]

T



     (Brilinta)      8-24                               by mouth           [facility]

[facility]



     90 MG      00:00:                               every 12           



     tablet      00                                 (twelve)           



                                                  hours.           

 

     traMADol      2020-0      Yes            50mg      Take 50 mg           [facility]



     (Ultram) 50      8-24                               by mouth.           [facility]



     MG tablet      00:00:                                              



               00                                                

 

     carvedilol      2020-0      Yes            25mg QD   Take 25 mg           [facility]

T



     (Coreg) 1      8-24                               by mouth 1           [facility]

[facility]



     mg/mL      00:00:                               (one) time           



     solution      00                                 each day.           

 

     aspirin 81      2020-0      Yes                 QD   1 (one)           [facility]



     MG EC      8-24                               time each           [facility]



     tablet      00:00:                               day.           



               00                                                

 

     insulin      2020-0      Yes            30U  Q12H Inject 30           [facility]



     glargine      8-24                               Units           [facility]



     (Lantus)      00:00:                               under the           



     100 UNIT/ML      00                                 skin every           



     injection                                         12             



                                                  (twelve)           



                                                  hours.           

 

     pantoprazol      2020-0      Yes            40mg QD   Take 40 mg           

[facility]



     e         8-24                               by mouth 1           [facility]



     (ProtoNix)      00:00:                               (one) time           



     40 MG EC      00                                 each day.           



     tablet                                                        

 

     ticagrelor      2020-0      Yes            90mg Q12H Take 90 mg           [facility]

T



     (Brilinta)      8-24                               by mouth           [facility]

[facility]



     90 MG      00:00:                               every 12           



     tablet      00                                 (twelve)           



                                                  hours.           

 

     traMADol      2020-0      Yes            50mg      Take 50 mg           [facility]



     (Ultram) 50      8-24                               by mouth.           [facility]



     MG tablet      00:00:                                              



               00                                                

 

     carvedilol      2020-0      Yes            25mg QD   Take 25 mg           [facility]

T



     (Coreg) 1      8-24                               by mouth 1           [facility]

[facility]



     mg/mL      00:00:                               (one) time           



     solution      00                                 each day.           

 

     aspirin 81      2020-0      Yes                 QD   1 (one)           [facility]



     MG EC      8-24                               time each           [facility]



     tablet      00:00:                               day.           



               00                                                

 

     insulin      2020-0      Yes            30U  Q12H Inject 30           [facility]



     glargine      8-24                               Units           [facility]



     (Lantus)      00:00:                               under the           



     100 UNIT/ML      00                                 skin every           



     injection                                         12             



                                                  (twelve)           



                                                  hours.           

 

     pantoprazol      2020-0      Yes            40mg QD   Take 40 mg           

[facility]



     e         8-24                               by mouth 1           [facility]



     (ProtoNix)      00:00:                               (one) time           



     40 MG EC      00                                 each day.           



     tablet                                                        

 

     ticagrelor      2020-0      Yes            90mg Q12H Take 90 mg           [facility]

T



     (Brilinta)      8-24                               by mouth           [facility]

[facility]



     90 MG      00:00:                               every 12           



     tablet      00                                 (twelve)           



                                                  hours.           

 

     traMADol      2020-0      Yes            50mg      Take 50 mg           [facility]



     (Ultram) 50      8-24                               by mouth.           [facility]

[facility]



     MG tablet      00:00:                                              



               00                                                

 

     carvedilol      2020-0      Yes            25mg QD   Take 25 mg           [facility]

T



     (Coreg) 1      8-24                               by mouth 1           [facility]

[facility]



     mg/mL      00:00:                               (one) time           



     solution      00                                 each day.           

 

     aspirin 81      2020-0      Yes                 QD   1 (one)           [facility]



     MG EC      8-24                               time each           [facility]



     tablet      00:00:                               day.           



               00                                                

 

     insulin      2020-0      Yes            30U  Q12H Inject 30           [facility]



     glargine      8-24                               Units           [facility]



     (Lantus)      00:00:                               under the           



     100 UNIT/ML      00                                 skin every           



     injection                                         12             



                                                  (twelve)           



                                                  hours.           

 

     pantoprazol      2020-0      Yes            40mg QD   Take 40 mg           

[facility]



     e         8-24                               by mouth 1           [facility]



     (ProtoNix)      00:00:                               (one) time           



     40 MG EC      00                                 each day.           



     tablet                                                        

 

     ticagrelor      2020-0      Yes            90mg Q12H Take 90 mg           [facility]

T



     (Brilinta)      8-24                               by mouth           [facility]

[facility]



     90 MG      00:00:                               every 12           



     tablet      00                                 (twelve)           



                                                  hours.           

 

     insulin      2020-0      Yes            30U  Q.5D Inject 30           [facility]

t



     glargine      8-24                               Units           [facility]



     (LANTUS)      00:00:                               subcutaneo           [facility]

ical



     100 unit/mL      00                                 usly 2           Center



     injection                                         (two)           



                                                  times           



                                                  daily Use           



                                                  as             



                                                  directed .           

 

     isosorbide      2020-0      Yes            60mg QD   Take 1           [facility]

t



     mononitrate      1-23                               tablet (60           [facility]

[facility]



     (IMDUR) 60      00:00:                               mg total)           [facility]

d[facility]



     MG 24 hr      00                                 by mouth           [facility]



     tablet                                         daily.           

 

     metFORMIN      -0 - No             1000mg Q.5D Take 2           [facility]

 [facility]



     (GLUCOPHAGE      1-23 -22                          tablets           [facility]

[facility]



     ) 500 MG      00:00: 23:59                          (1,000 mg           [facility]

ical



     tablet      00   :00                           total) by           Center



                                                  mouth 2           



                                                  (two)           



                                                  times           



                                                  daily           



                                                  Resume on           



                                                  .           

 

     aspirin 81 aspirin 81           No             1    Q1D  aspirin 81        

   [facility]



     mg   mg                                      mg             [facility]



     tablet,luigi tablet,luigi                                    tablet,del      

     [facility]



     yed release yed release                                    ayed           e



     Take 1 Take 1                                    release           



     tablet tablet                                    Take 1           



     every day every day                                    tablet           



     by oral by oral                                    every day           



     route. route.                                    by oral           



                                                  route.           

 

     atorvastati atorvastati           No                       atorvastat      

     [facility]



     n 80 mg n 80 mg                                    in 80 mg           [facility]

[facility]



     tablet Take tablet Take                                    tablet          

 [facility]



     1 tablet 1 tablet                                    Take 1           e



     every day every day                                    tablet           



     by oral by oral                                    every day           



     route. route.                                    by oral           



                                                  route.           

 

     carvedilol carvedilol           No                       carvedilol        

   [facility]



     25 mg 25 mg                                    25 mg           [facility]



     tablet Take tablet Take                                    tablet          

 [facility]



     1 tablet 1 tablet                                    Take 1           e



     twice a day twice a day                                    tablet          

 



     by oral by oral                                    twice a           



     route. route.                                    day by           



                                                  oral           



                                                  route.           

 

     cyclobenzap cyclobenzap           No                       cyclobenza      

     [facility]



     rine 10 mg rine 10 mg                                    prine 10          

 Family



     tablet 1 tablet 1                                    mg tablet           Pr

actic



     TABLET AS TABLET AS                                    1 TABLET           e



     NEEDED FOR NEEDED FOR                                    AS NEEDED         

  



     PAIN EVERY PAIN EVERY                                    FOR PAIN          

 



     8 HRS 8 HRS                                    EVERY 8           



     ORALLY 30 ORALLY 30                                    HRS ORALLY          

 



     DAY(S) DAY(S)                                    30 DAY(S)           

 

     duloxetine duloxetine           No                       duloxetine        

   [facility]



     30 mg 30 mg                                    30 mg           Family



     capsule,del capsule,del                                    capsule,de      

     [facility]



     ayed ayed                                    layed           e



     release release                                    release           



     Take 1 Take 1                                    Take 1           



     capsule capsule                                    capsule           



     every day every day                                    every day           



     by oral by oral                                    by oral           



     route. route.                                    route.           

 

     folic acid folic acid           No             1    Q1D  folic acid        

   [facility]



     1 mg tablet 1 mg tablet                                    1 mg           F

amily



     Take 1 Take 1                                    tablet           [facility]



     tablet tablet                                    Take 1           e



     every day every day                                    tablet           



     by oral by oral                                    every day           



     route. route.                                    by oral           



                                                  route.           

 

     FreeStyle FreeStyle           No                       FreeStyle           

[facility]



     Lite Strips Lite Strips                                    Lite           F

amily



     Take 1 Take 1                                    Strips           [facility]



     strip 3 strip 3                                    Take 1           e



     times a day times a day                                    strip 3         

  



     by miscell. by miscell.                                    times a         

  



     route. route.                                    day by           



                                                  miscell.           



                                                  route.           

 

     isosorbide isosorbide           No                       isosorbide        

   [facility]



     mononitrate mononitrate                                    mononitrat      

     [facility]



     ER 30 mg ER 30 mg                                    e ER 30 mg           P

ractic



     tablet,exte tablet,exte                                    tablet,ext      

     e



     nded nded                                    ended           



     release 24 release 24                                    release 24        

   



     hr Take 1 hr Take 1                                    hr Take 1           



     tablet tablet                                    tablet           



     every day every day                                    every day           



     by oral by oral                                    by oral           



     route. route.                                    route.           

 

     Lantus Lantus           No                       Lantus           [facility]



     Solostar Solostar                                    Solostar           [facility]

[facility]



     U-100 U-100                                    U-100           [facility]



     Insulin 100 Insulin 100                                    Insulin         

  e



     unit/mL (3 unit/mL (3                                    100            



     mL)  mL)                                     unit/mL (3           



     subcutaneou subcutaneou                                    mL)            



     s pen per s pen per                                    subcutaneo          

 



     endo 50 endo 50                                    us pen per           



     units twice units twice                                    endo 50         

  



     a day a day                                    units           



                                                  twice a           



                                                  day            

 

     losartan losartan           No                       losartan           [facility]

[facility]



     100 mg 100 mg                                    100 mg           [facility]



     tablet Take tablet Take                                    tablet          

 [facility]



     1 tablet 1 tablet                                    Take 1           e



     every day every day                                    tablet           



     by oral by oral                                    every day           



     route. route.                                    by oral           



                                                  route.           

 

     metformin metformin           No                       metformin           

[facility]



     1,000 mg 1,000 mg                                    1,000 mg           [facility]

[facility]



     tablet Take tablet Take                                    tablet          

 [facility]



     1 tablet 1 tablet                                    Take 1           e



     twice a day twice a day                                    tablet          

 



     by oral by oral                                    twice a           



     route. route.                                    day by           



                                                  oral           



                                                  route.           

 

     Novolog Novolog           No                       Novolog           [facility]

[facility]



     Flexpen Flexpen                                    Flexpen           [facility]



     U-100 U-100                                    U-100           [facility]



     Insulin per Insulin per                                    Insulin         

  e



     Endo 15 Endo 15                                    per Endo           



     units units                                    15 units           



     before before                                    before           



     meals meals                                    meals           

 

     pantoprazol pantoprazol           No                       pantoprazo      

     [facility]



     e 40 mg e 40 mg                                    le 40 mg           [facility]

y



     tablet,luigi tablet,luigi                                    tablet,del      

     [facility]



     yed release yed release                                    ayed           e



     Take 1 Take 1                                    release           



     tablet tablet                                    Take 1           



     every day every day                                    tablet           



     by oral by oral                                    every day           



     route as route as                                    by oral           



     needed. needed.                                    route as           



                                                  needed.           

 

     ranolazine ranolazine           No                       ranolazine        

   [facility]



      mg  mg                                     mg           

[facility]



     tablet,exte tablet,exte                                    tablet,ext      

     [facility]



     nded nded                                    ended           e



     release,12 release,12                                    release,12        

   



     hr Take 1 hr Take 1                                    hr Take 1           



     tablet tablet                                    tablet           



     twice a day twice a day                                    twice a         

  



     by oral by oral                                    day by           



     route. route.                                    oral           



                                                  route.           

 

     sacubitril sacubitril           No             1    BID  sacubitril        

   [facility]



     97   97                                      97             Family



     mg-valsarta mg-valsarta                                    mg-valsart      

     [facility]



     n 103 mg n 103 mg                                    an 103 mg           e



     tablet Take tablet Take                                    tablet          

 



     1 tablet 1 tablet                                    Take 1           



     twice a day twice a day                                    tablet          

 



     by oral by oral                                    twice a           



     route. route.                                    day by           



                                                  oral           



                                                  route.           

 

     spironolact spironolact           No                       spironolac      

     [facility]



     one 25 mg one 25 mg                                    tone 25 mg          

 Family



     tablet TAKE tablet TAKE                                    tablet          

 [facility]



     1 TABLET 1 TABLET                                    TAKE 1           e



     DAILY DAILY                                    TABLET           



                                                  DAILY           

 

     ticagrelor ticagrelor           No                       ticagrelor        

   [facility]



     Per Card Per Card                                    Per Card           [facility]

[facility]



                                                                 [facility]



                                                                 e

 

     tramadol 50 tramadol 50           No                       tramadol        

   [facility]



     mg tablet mg tablet                                    50 mg           [facility]

ly



     TAKE 1 TAKE 1                                    tablet           [facility]



     TABLET BY TABLET BY                                    TAKE 1           e



     MOUTH EVERY MOUTH EVERY                                    TABLET BY       

    



     6 HOURS AS 6 HOURS AS                                    MOUTH           



     NEEDED NEEDED                                    EVERY 6           



                                                  HOURS AS           



                                                  NEEDED           

 

     Trulicity Trulicity           No                       Trulicity           

[facility]



     [facility]                                    [facility]           [facility]



                                                                 [facility]



                                                                 e







Immunizations







           Ordered Immunization Filled Immunization Date       Status     Commen

ts   Source



           Name       Name                                        

 

           zoster recombinant zoster recombinant 2021 Completed           

  [facility]



                                 09:48:00                         Practice

 

           SARS-COV-2 (COVID-19) SARS-COV-2 (COVID-19) 2021 Completed     

        [facility]



           vaccine, UNSPECIFIED vaccine, UNSPECIFIED 00:00:00                   

      Practice

 

           SARS-COV-2 (COVID-19) SARS-COV-2 (COVID-19) 2021 Completed     

        [facility]



           vaccine, UNSPECIFIED vaccine, UNSPECIFIED 00:00:00                   

      Practice

 

           zoster recombinant zoster recombinant 2020 Completed           

  [facility]



                                 10:45:00                         Practice

 

           influenza, high-dose, influenza, high-dose, 2020 Completed     

        [facility]



           quadrivalent quadrivalent 11:13:00                         Practice

 

           influenza, high dose influenza, high dose 2019-09-10 Completed       

      [facility]



           seasonal   seasonal   10:00:00                         Practice

 

           influenza, high dose influenza, high dose 2018 Completed       

      [facility]



           seasonal   seasonal   11:52:00                         Practice

 

           pneumococcal pneumococcal 2018 Completed             [facility]



           polysaccharide PPV23 polysaccharide PPV23 11:52:00                   

      Practice

 

           pneumococcal pneumococcal 2017 [facility]



           conjugate PCV 13 conjugate PCV 13 00:00:00                         Pr

actice

 

           influenza, influenza, 2017 [facility]



           unspecified unspecified 00:00:00                         Practice



           formulation formulation                                  

 

           Tdap       Tdap       2015 Completed             [facility]



                                 00:00:00                         Practice







Vital Signs







             Vital Name   Observation Time Observation Value Comments     Source

 

             WEIGHT       2022 06:00:00 104.418 kg                

 

             WEIGHT       2022-08-15 05:36:00 103.148 kg                

 

             WEIGHT       2022 06:00:00 103.148 kg                

 

             WEIGHT       2022 06:00:00 102.876 kg                

 

             WEIGHT       2022 06:00:00 101.969 kg                

 

             WEIGHT       2022 06:00:00 102.649 kg                

 

             WEIGHT       2022-08-10 06:00:00 102.83 kg                 

 

             WEIGHT       2022 06:00:00 102.558 kg                

 

             WEIGHT       2022 06:00:00 99.791 kg                 

 

             WEIGHT       2022 06:00:00 99.791 kg                 

 

             WEIGHT       2022 06:00:00 100.744 kg                

 

             HEIGHT       2022 23:38:00 182.9 cm                  

 

             WEIGHT       2022 23:38:00 99.882 kg                 

 

             HEIGHT       2022 18:23:00 182.9 cm                  

 

             WEIGHT       2022 18:23:00 102.513 kg                

 

             WEIGHT       2022 06:00:00 104.418 kg                

 

             WEIGHT       2022-08-15 05:36:00 103.148 kg                

 

             WEIGHT       2022 06:00:00 103.148 kg                

 

             WEIGHT       2022 06:00:00 102.876 kg                

 

             WEIGHT       2022 06:00:00 101.969 kg                

 

             WEIGHT       2022 06:00:00 102.649 kg                

 

             WEIGHT       2022-08-10 06:00:00 102.83 kg                 

 

             WEIGHT       2022 06:00:00 102.558 kg                

 

             WEIGHT       2022 06:00:00 99.791 kg                 

 

             WEIGHT       2022 06:00:00 99.791 kg                 

 

             WEIGHT       2022 06:00:00 100.744 kg                

 

             HEIGHT       2022 23:38:00 182.9 cm                  

 

             WEIGHT       2022 23:38:00 99.882 kg                 

 

             HEIGHT       2022 18:23:00 182.9 cm                  

 

             WEIGHT       2022 18:23:00 102.513 kg                

 

             Body height  2022 15:25:00 181.6 cm                  [facility]

[facility]

 

             Body weight  2022 15:25:00 112.492 kg                [facility]

[facility]

 

             BMI          2022 15:25:00 34.11 kg/m2               [facility]

h

 

             Body height  2022 15:25:00 181.6 cm                  [facility]

h

 

             Body weight  2022 15:25:00 112.492 kg                [facility]

[facility]

 

             BMI          2022 15:25:00 34.11 kg/m2               [facility]

[facility]

 

             Body height  2021-12-10 15:40:00 181.6 cm                  [facility]

h

 

             Body weight  2021-12-10 15:40:00 112.492 kg                [facility]

[facility]

 

             BMI          2021-12-10 15:40:00 34.11 kg/m2               [facility]

[facility]

 

             Body height  2021 15:03:00 181.6 cm                  [facility]

[facility]

 

             Body weight  2021 15:03:00 112.492 kg                [facility]

[facility]

 

             BMI          2021 15:03:00 34.11 kg/m2               [facility]

[facility]

 

             Body height  2021 15:08:00 181.6 cm                  [facility]

[facility]

 

             Body weight  2021 15:08:00 112.492 kg                [facility]

[facility]

 

             BMI          2021 15:08:00 34.11 kg/m2               [facility]

[facility]

 

             Body height  2021 14:43:00 181.6 cm                  [facility]

[facility]

 

             Body weight  2021 14:43:00 112.492 kg                [facility]

[facility]

 

             BMI          2021 14:43:00 34.11 kg/m2               [facility]

[facility]

 

             Body height  2021-10-15 14:03:00 181.6 cm                  [facility]

[facility]

 

             Body weight  2021-10-15 14:03:00 112.492 kg                [facility]

[facility]

 

             BMI          2021-10-15 14:03:00 34.11 kg/m2               [facility]

[facility]

 

             BP Diastolic 2021 00:00:00 71 mm[Hg]                 [facility]



                                                                 [facility]

 

             Height       2021 00:00:00 70 [in_i]                 [facility] 

Family



                                                                 Practice

 

             BMI (Body Mass 2021 00:00:00 36.6 kg/m2                [facility]

[facility]



             Index)                                              Practice

 

             BP Systolic  2021 00:00:00 123 mm[Hg]                [facility]



                                                                 [facility]

 

             Body Weight  2021 00:00:00 255.2 [lb_av]              [facility]

 Family



                                                                 Practice

 

             Systolic blood 2021 08:20:00 130 mm[Hg]                [facility]

sity of



             pressure                                            [facility]

 

             Diastolic blood 2021 08:20:00 65 mm[Hg]                 Unive

rsity of



             pressure                                            [facility]

 

             Heart rate   2021 08:20:00 80 /min                   [facility]

ty of



                                                                 [facility]

 

             Body temperature 2021 08:20:00 37.06 Roxie                 [facility]



                                                                 [facility]

 

             Res[facility] 2021 08:20:00 17 /min                   [facility]

 

             Body height  2021 08:20:00 182.9 cm                  [facility]

ty [facility]

 

             Body weight  2021 08:20:00 111.131 kg                Universi

ty [facility]

 

             BMI          2021 08:20:00 33.23 kg/m2               Universi

[facility]

 

             Oxygen saturation in 2021 08:20:00 98 /min                   

[facility]



             Arterial blood by                                        [facility]

[facility]



             Pulse oximetry                                        [facility]

 

             BP Diastolic 2021 00:00:00 68 mm[Hg]                 [facility]



                                                                 [facility]

 

             Height       2021 00:00:00 70 [in_i]                 [facility]



                                                                 [facility]

 

             BMI (Body Mass 2021 00:00:00 35.9 kg/m2                [facility]

[facility]



             Index)                                              Practice

 

             BP Systolic  2021 00:00:00 149 mm[Hg]                [facility]



                                                                 [facility]

 

             Body Weight  2021 00:00:00 250.4 [lb_av]              [facility]



                                                                 [facility]

 

             BP Diastolic 2020 00:00:00 78 mm[Hg]                 [facility]



                                                                 [facility]

 

             Height       2020 00:00:00 70 [in_i]                 [facility]

 

             BMI (Body Mass 2020 00:00:00 33.4 kg/m2                [facility]

[facility]



             Index)                                              Practice

 

             BP Systolic  2020 00:00:00 120 mm[Hg]                [facility]



                                                                 [facility]

 

             Body Weight  2020 00:00:00 232.8 [lb_av]              [facility]



                                                                 [facility]

 

             BP Diastolic 2020 00:00:00 73 mm[Hg]                 [facility]

 

             Height       2020 00:00:00 70 [in_i]                 [facility]

 

             BMI (Body Mass 2020 00:00:00 35 kg/m2                  [facility]

[facility]



             Index)                                              Practice

 

             BP Systolic  2020 00:00:00 123 mm[Hg]                [facility]

 

             Body Weight  2020 00:00:00 244 [lb_av]               [facility]

 

             HEIGHT       2020 00:00:00 182.9 cm                  

 

             WEIGHT       2020 00:00:00 105.9 kg                  

 

             HEIGHT       2020 00:00:00 182.9 cm                  

 

             WEIGHT       2020 00:00:00 105.9 kg                  

 

             BP Diastolic 2020 00:00:00 73 mm[Hg]                 [facility]

 

             Height       2020 00:00:00 70 [in_i]                 [facility]



                                                                 [facility]

 

             BMI (Body Mass 2020 00:00:00 34.8 kg/m2                [facility]

[facility]



             Index)                                              Practice

 

             BP Systolic  2020 00:00:00 153 mm[Hg]                [facility]



                                                                 [facility]

 

             Body Weight  2020 00:00:00 242.6 [lb_av]              [facility]



                                                                 [facility]

 

             BP Diastolic 2019 00:00:00 80 mm[Hg]                 [facility]



                                                                 [facility]

 

             Height       2019 00:00:00 70 [in_i]                 [facility]

 

             BMI (Body Mass 2019 00:00:00 35.2 kg/m2                [facility]

[facility]



             Index)                                              Practice

 

             BP Systolic  2019 00:00:00 128 mm[Hg]                [facility]



                                                                 [facility]

 

             Body Weight  2019 00:00:00 245 [lb_av]               [facility]



                                                                 [facility]

 

             BP Diastolic 2019-09-10 00:00:00 80 mm[Hg]                 [facility]

 

             Height       2019-09-10 00:00:00 70 [in_i]                 [facility]

 

             BMI (Body Mass 2019-09-10 00:00:00 35.1 kg/m2                [facility]

[facility]



             Index)                                              Practice

 

             BP Systolic  2019-09-10 00:00:00 140 mm[Hg]                [facility]



                                                                 [facility]

 

             Body Weight  2019-09-10 00:00:00 244.4 [lb_av]              [facility]

 Family



                                                                 Practice

 

             BP Diastolic 2019 00:00:00 84 mm[Hg]                 [facility]



                                                                 [facility]

 

             Height       2019 00:00:00 70 [in_i]                 [facility]



                                                                 [facility]

 

             BMI (Body Mass 2019 00:00:00 34.9 kg/m2                [facility]

e [facility]



             Index)                                              Practice

 

             BP Systolic  2019 00:00:00 132 mm[Hg]                [facility]



                                                                 P[facility]

 

             Body Weight  2019 00:00:00 243.4 [lb_av]              [facility]

 [facility]



                                                                 Practice

 

             BP Diastolic 2019 00:00:00 82 mm[Hg]                 [facility] 

Family



                                                                 Practice

 

             Height       2019 00:00:00 70 [in_i]                 [facility]



                                                                 Practice

 

             BMI (Body Mass 2019 00:00:00 33.9 kg/m2                [facility] Family



             Index)                                              Practice

 

             BP Systolic  2019 00:00:00 142 mm[Hg]                [facility]



                                                                 P[facility]

 

             Body Weight  2019 00:00:00 236.6 [lb_av]              [facility]

 [facility]



                                                                 Practice

 

             BP Diastolic 2019 00:00:00 68 mm[Hg]                 [facility]



                                                                 P[facility]

 

             Height       2019 00:00:00 72 [in_i]                 [facility]



                                                                 P[facility]

 

             BMI (Body Mass 2019 00:00:00 32.1 kg/m2                [facility] Family



             Index)                                              Practice

 

             BP Systolic  2019 00:00:00 122 mm[Hg]                [facility]



                                                                 P[facility]

 

             Body Weight  2019 00:00:00 236.4 [lb_av]              [facility]

 [facility]



                                                                 Practice

 

             Body mass index 2021 08:37:00 34.13 kg/m2               [facility]



             (BMI) [Ratio]                                        

 

             Body height  2021 08:37:00 71.5 [in_us]              [facility]

[facility]

 

             Weight       2021 08:37:00 248.2 [lb_av]              [facility]

[facility]







Procedures







                Procedure       Date / Time     Performing Clinician Source



                                Performed                       

 

                RI ARTHROCENTESIS 2021-12-10 19:22:48 Pasha Wilder    [facility]



                ASPIR&/INJ MAJOR JT/BURSA                                 



                W/O US                                          

 

                XR HIP 2 OR 3 VW RIGHT 2021-12-10 15:56:01 [facility]

[facility]

 

                ARTHROCENTESIS ASPIR&/INJ 2021 15:38:48 [facility]



                MAJOR JT/BURSA W/O US                                 



                BILATERAL                                       

 

                ARTHROCENTESIS ASPIR&/INJ 2021 15:15:27 [facility]



                MAJOR JT/BURSA W/O US                                 



                BILATERAL                                       

 

                ARTHROCENTESIS ASPIR&/INJ 2021 14:39:41 [facility]



                MAJOR JT/BURSA W/O US                                 



                BILATERAL                                       

 

                RI REMV EXT CANAL FOREIGN 2021 09:17:00 Shakira Marcellosruthi GLADYS TORRES

[facility]



                BODY                                            Medical Branch

 

                NOTICE OF PRIVACY 2021 08:24:18 Doctor Unassigned, [facility]



                PRACTICES                       [place]         [facility]

 

                CONSENT/REFUSAL FOR 2021 08:23:53 Doctor Unassigned, Isaac

[facility]



                DIAGNOSIS AND TREATMENT                 [place]         Medical 

Branch

 

                Cardiac Catheterization 2020 00:00:00                 [facility] Family



                                                                [facility]

 

                Cardiac Catheterization 2020 00:00:00                 [facility]

 

                Cardiac Catheterization 2019 00:00:00                 [facility]

 

                X-RAY OF CHEST 2 VIEW 2019 [facility]00:00                 [facility]



                                                                [facility]

 

                Angioplasty     2018 00:00:00                 [facility] Meghna white



                                                                [facility]

 

                Cardiac Catheterization 2018 00:00:00                 [facility]

 

                Vascular Surgery 2018 00:00:00                 [facility]

 

                Neurosurgery (Brain) 2012 00:00:00                 [facility]

 

                Cardiac Surgery 2008 00:00:00                 [facility]

[facility]



                                                                Practice

 

                Vasectomy                                       [facility]

 

                Tooth Root Removal                                 [facility] Famil

y



                                                                Practice

 

                Tonsilectomy/adenoids                                 [facility]

 

                Procedure on Spine                                 [facility] Famil

y



                                                                Practice







Plan of Care







             Planned Activity Planned Date Details      Comments     Source

 

             Future Scheduled Test 2025   DTAP/TDAP/TD              [facility]



                          00:00:00     VACCINES (2 - Td or              Medical 

Center



                                       Tdap) [code =              



                                       DTAP/TDAP/TD              



                                       VACCINES (2 - Td or              



                                       Tdap)]                    

 

             Future Scheduled Test 2021   INFLUENZA VACCINE              C

[facility]



                          00:00:00     (#1) [code =              Medical Center



                                       INFLUENZA VACCINE              



                                       (#1)]                     

 

             Diagnostic Test 2021   hemoglobin A1C,              [facility]

amily



             Pending      00:00:00     fingerstick [code =              Practice



                                       hemoglobin A1C,              



                                       fingerstick]              

 

             Future Scheduled Test 2021   DEPRESSION SCREENING            

  [facility]



                          00:00:00     (12+) [code =              Medical Center



                                       DEPRESSION SCREENING              



                                       (12+)]                    

 

             Future Scheduled Test 2021   FALLS RISK SCREENING            

  [facility]



                          00:00:00     [code = FALLS RISK              Medical C

enter



                                       SCREENING]                

 

             Future Scheduled Test 2019   MEDICARE ANNUAL              [facility]



                          00:00:00     WELLNESS (YEAR 2 or              Medical 

Center



                                       FIRST YEAR if no              



                                       IPPE) [code =              



                                       MEDICARE ANNUAL              



                                       WELLNESS (YEAR 2 or              



                                       FIRST YEAR if no              



                                       IPPE)]                    

 

             Future Scheduled Test 2003   SHINGLES VACCINES (1            

  [facility]



                          00:00:00     of 2) [code =              Medical Center



                                       SHINGLES VACCINES (1              



                                       of 2)]                    

 

             Future Scheduled Test 1971   [facility]ATITIS C               [facility]



                          00:00:00     SCREENING [code =              Medical 

nter



                                       HEPATITIS C               



                                       SCREENING]                

 

             Future Scheduled Test 1965   COVID-19 VACCINE (1)            

  [facility]



                          00:00:00     [code = COVID-19              Medical Debra

ter



                                       VACCINE (1)]              

 

             Future Scheduled Test 1953   [facility]ening [facility]              [facility]



                          00:00:00     malignant neoplasm              Medical C

enter



                                       of colon (procedure)              



                                       [code = 729512873]              

 

             Instructions                                        [facility]







Encounters







        Start   End     Encounter Admission Attending Care    Care    Encounter 

Source



        Date/Time Date/Time Type    Type    Clinicians Facility Department ID   

   

 

        2022         Outpatient                 [facility]     [id number]

 [place]



        13:38:17                                                 [id number] [facility]

 

        2022         [facility]         [facility]     [id number]

 [place]



        09[place]56:30                         PASHA                   [id number] [facility]

 

        2022-06-15         Outpatient         [facility]     [id number]

 [place]



        14:42:50                         PASHA                   [id number] [facility]

 

        2022         Outpatient         [facility]     [id number] 

[place]



        10:01:52                         PASHACount includes the [facility]

 

        2021-12-10         Outpatient                 [facility]     [id number] 

[place]



        09:46:01                                                         [facility]

 

        2021         [facility]         [facility]     [id number] 

[facility]



        [facility]

 

        [facility] Inpatient ER      KORY, SLE    Emergency [id number]

[id number] [facility]



        18:13:00 13:23:00                 YODIT                             

 

        2022 Outpatient                 BCM     BCYOLANDA     [id number]

[address] [place] [address]



        00:00:00 23:59:00                                                 Sofy

 

        2022 [facility] [id number]

440 [place]



        10:15:00 11:11:16 Visit           Pasha SYLVESTER [id number]         H

[facility]



                                                MEDICAL [id number]         



                                                PLAZA 9 [phone]         



                                                        5               

 

        2022 Office          Tina  [facility] [id number]

227 [place]



        09:45:00 10:01:58 Visit           Pasha SYLVESTER [id number]         H

[facility]



                                                MEDICAL [id number]         



                                                PLAZA 7 [phone]         



                                                        5               

 

        2021 Outpatient         Bui_Q   VFP     VFP     [id number]

[address] [place]



        03:46:00 03:46:00                                         [id number]   Family



                                                                        Practic



                                                                        e

 

        2021 Outpatient         Bui_Q   VFP     VFP     [id number]

[address] [place]



        03:46:00 03:46:00                                         [id number]   Family



                                                                        Practic



                                                                        e

 

        2021 Outpatient         Bui_Q   VFP     VFP     [id number]

202 [place]



        03:46:00 03:46:00                                         [id number]   Family



                                                                        Practic



                                                                        e

 

        2021-12-10 2021-12-10 Office          Tina  [facility] [id number]

[id number] [place]



        09:45:00 10:51:22 Visit           Pasha SYLVESTER [id number]         H

[facility]



                                                MEDICAL [id number]         



                                                PLAZA 5 [phone]         



                                                        5               

 

        2021 Outpatient         Bui_Q   VFP     VFP     [id number]

[address] [place]



        11:28:00 11:28:00                                         [id number]   Family



                                                                        Practic



                                                                        e

 

        2021 Outpatient         Bui_Q_WAGDN VFP     VFP     335

429-202 [place]



        11:28:00 11:28:00                 U                       [id number]   Family



                                                                        Practic



                                                                        e

 

        2021 Outpatient         Bui_Q_WAGDN VFP     VFP     335

429-202 [facility]



        11:28:00 11:28:00                 U                       [id number]   Family



                                                                        Practic



                                                                        e

 

        2021 Outpatient         Bui_Q_WAGDN VFP     VFP     335

429-202 [facility]



        11:28:00 11:28:00                 U                       [id number]   Family



                                                                        Practic



                                                                        e

 

        2021 Office          HOMAR Wilder [facility] [id number]

332 [place]



        08:47:07 09:23:11 Visit           Pashatramaine SYLVESTER 350.1.13.58         H

ealt



                                                MEDICAL [id number]         



                                                PLAZA 6 [phone]         



                                                        5               

 

        2021 Procedure         TINA  [facility] [id number]

[id number] [place]



        09:02:14 09:17:14 Visit           PASHATRAAMINE SYLVESTER 350.1.13.58         H

ealt



                                                MEDICAL [id number]         



                                                PLAZA 1 [phone]         



                                                        5               

 

        2021 Office          HOMAR Wilder [facility] 1.2.840.114 [id number]

832 [place]



        09:35:28 10:11:06 Visit           Pasha SYLVESTER 350.1.13.58         [facility]



                                                MEDICAL [id number]         



                                                PLAZA 7 [phone]         



                                                        5               

 

        2021-10-28 2021-10-28 Outpatient         Bui_Q   VFP     VFP     [id number]

 [facility]



        12:52:00 12:52:00                                         [id number]   Family



                                                                        Practic



                                                                        e

 

        2021-10-28 2021-10-28 Outpatient         Bui_Q_WAGDN VFP     VFP     335

429 [facility]



        12:52:00 12:52:00                 U                       [id number]   Family



                                                                        Practic



                                                                        e

 

        2021-10-15 2021-10-15 Office          Tina  [facility] [id number]

061 [place]



        08:40:45 09:48:37 Visit           Pasha SYLVESTER 350.1.13.58         H

ealt



                                                MEDICAL [id number]         



                                                PLAZA 4 [phone]         



                                                        5               

 

        2021 Outpatient         Bui_Q   VFP     VFP     [id number]

 [facility]



        06:32:00 06:32:00                                         [id number]   Family



                                                                        Practic



                                                                        e

 

        2021 Outpatient         Bui_Q   VFP     VFP     [id number]-

202 [facility]



        09:47:00 09:47:00                                         [id number]   Family



                                                                        Practic



                                                                        e

 

        2021 Michael Lira                 VFP     [place] -    [id number]

4 [place]



        00:00:00 00:00:00 MD Sharon:                         [facility]         Famil

y



                        [id number]                           Medical -         Practi

c



                        Shadow                          VM_HOU_Shad         e



                        [facility],                                           



                        [address],                                         



                        [place], [place]                                              



                        [address]                                         



                        , Ph.                                           



                        [phone]                                         

 

        2021 Outpatient         Bui_Q   VFP     VFP     [id number]-

 [facility]



        10:50:00 10:50:00                                         [id number]   Family



                                                                        Practic



                                                                        e

 

        2021 Emergency         [place], [facility]    [id number]

[id number] [facility]



        03:34:00 04:24:00                 Blanche [facility] 350.1.13.10         

[facility] [id number]         [facility]  [phone]         [address]

 

        2021 Emergency X               [facility]    ERT     [id number]

22 [facility]



        03:21:00 03:21:00                                                 ity [facility]

 

        2021 Outpatient         Bui_Q_WAG VFP     VFP     [id number]

 [facility]



        01:44:00 01:44:00                                         [id number]   Family



                                                                        Practic



                                                                        e

 

        2021 Bo WILDER  [facility]     Orthopedics 096 [id number] [place]



        09:00:00 09:00:00 t; PASHA WILDER M.D.         [facility]      

   Lauri



                        carine DUARTE M.D.                                            

 

        2021 Outpatient         Bui_Q   VFP     VFP     [id number] [facility]



        04:44:00 04:44:00                                         [id number]   Family



                                                                        Practic



                                                                        e

 

        2021 Outpatient         Bui_Q   VFP     VFP     [id number]

 [facility]



        05:54:00 05:54:00                                         [id number]   Family



                                                                        Practic



                                                                        e

 

        2021 Michael Lira                 P     [place] -    [id number]

3 [facility]



        00:00:00 00:00:00 MD Sharon:                         [facility]         Famil

y



                        [id number]                           Medical -         Practi

c



                        Shadow                          VM_HOU_Shad         e



                        [facility],                                           



                        [address], [place]                                              



                        [address]                                         



                        , Ph.                                           



                        [phone]                                         

 

        2020 Outpatient         Bui_Q_WAG VFP     VFP     [id number]

 [facility]



        03:16:00 03:16:00                                         [id number]   Family



                                                                        Practic



                                                                        e

 

        2020 Outpatient         Bui_Q_WAG VFP     VFP     [id number]

202 [facility]



        03:16:00 03:16:00                                         [id number]   Family



                                                                        Practic



                                                                        e

 

        2020 Outpatient         Bui_Q   VFP     VFP     [id number] [facility]



        11:20:00 11:20:00                                         [id number]   Family



                                                                        Practic



                                                                        e

 

        2020 Outpatient         Bui_Q   VFP     VFP     [id number] [facility]



        11:20:00 11:20:00                                         [id number]   Family



                                                                        Practic



                                                                        e

 

        2020 Outpatient         Bui_Q   VFP     VFP     [id number]-

 [facility]



        11:20:00 11:20:00                                         [id number]   Family



                                                                        Practic



                                                                        e

 

        2020 Outpatient         Bui_Q_WAG VFP     VFP     [id number]

 [facility]



        11:20:00 11:20:00                                         [id number]   Family



                                                                        Practic



                                                                        e

 

        2020-12-10 2020-12-10 Outpatient         Bui_Q_WAG VFP     VFP     [id number]

 [facility]



        02:28:00 02:28:00                                         [id number]   Family



                                                                        Practic



                                                                        e

 

        2020 Outpatient         Bui_Q_WAG VFP     VFP     [id number]

 [facility]



        11:36:00 11:36:00                                         [id number]   Family



                                                                        Practic



                                                                        e

 

        2020 Michael Lira                 VFP     [place] -    [id number] [place] [address]



        00:00:00 00:00:00 MD Sharon:                         [facility]         Famil

y



                        6122                            Medical -         [facility] [address],                            (WAG)           



                        [place], [place]                                              



                        [address]                                         



                        , Ph.                                           



                        [phone]                                         

 

        2020 Outpatient         Bui_Q_WAG VFP     VFP     [id number]

 [facility]



        01:23:00 01:23:00                                         [id number]   Family



                                                                        Practic



                                                                        e

 

        2020 Outpatient         Bui_Q_WAG VFP     VFP     [id number]

 [facility]



        10:19:00 10:19:00                                         [id number]   Family



                                                                        Practic



                                                                        e

 

        2020 Outpatient         Bui_Q   VFP     VFP     [id number] [facility]



        10:19:00 10:19:00                                         [id number]   Family



                                                                        Practic



                                                                        e

 

        2020 Outpatient         Bui_Q   VFP     VFP     [id number] [facility]



        06:26:00 06:26:00                                         [id number]   Family



                                                                        Practic



                                                                        e

 

        2020 Appointmen         ROEL GRAF     Orthopedics 686

[address] [place]



        08:00:00 08:00:00 t; ROEL MELGAR,           - [facility]



                        CL[place] MELGAR,         LOVE JARAMILLO M.D. M.D.                                            

 

        2020 Outpatient         Bui_Q_WAG VFP     VFP     [id number]

 [facility]



        11:21:00 11:21:00                                         33024   Family



                                                                        Practic



                                                                        e

 

        2020 Outpatient         Bui_Q   VFP     VFP     [id number]

 [facility]



        11:21:00 11:21:00                                         [id number]   Family



                                                                        Practic



                                                                        e

 

        2020 Outpatient         Bui_Q_WAG VFP     VFP     [id number]

 [facility]



        02:34:00 02:34:00                                         [id number]   Family



                                                                        Practic



                                                                        e

 

        2020 Outpatient         Bui_Q_WAG VFP     VFP     [id number]

 [facility]



        06:48:00 06:48:00                                         [id number]   Family



                                                                        Practic



                                                                        e

 

        2020 Outpatient         Bui_Q   VFP     VFP     [id number]

 [facility]



        10:18:00 10:18:00                                         [id number]   Family



                                                                        Practic



                                                                        e

 

        2020 Michael Lira                 VFP     [place] -    [id number]

 [place]



        00:00:00 00:00:00 MD Sharon:                         [facility]         Famil

y



                        6122                            Medical -         [facility] [place] [address],                            (WAG)           



                        [place],                                         



                        [place]                                              



                        [address]                                         



                        , Ph.                                           



                        [phone]                                         

 

        [facility]     [place]     [id number]

0 [place]



        09:45:00 09:45:00 t; Balta PALOMO ANDREW, ans ANDREW, M.D. M.D.                                            

 

        2020 Emergency ER              SLE    Emergency [id number]

2464 [facility]



        21:37:00 21:37:00                                                 

 

        2020 Appointmen         ROEL [facility]     [id number]

8 [place]



        09:45:00 09:45:00 t; Balta PALOMO ANDREW, ans ANDREW, M.D. M.D.                                            

 

        2020 Outpatient         Bui_Q   VFP     VFP     [id number]

 [facility]



        03:42:00 03:42:00                                         [id number]   Family



                                                                        Practic



                                                                        e

 

        2020 Appointmen         ROEL [facility]     Orthopedics 662

[address] [place]



        09:45:00 09:45:00 t; ROEL MELGAR,           [place]zabella [place] N[place] ans ANDREW, M.D. M.D.                                            

 

        2020 Appointmen         MIREYANG [facility]     Orthopedics 662

[address] [place]



        09:45:00 09:45:00 t; Izabella PALOMO ANDREW, II ans ANDREW, M.D. M.D.                                            

 

        2020 Appointmen         SRUTHISTEPH [facility]     Orthopedics 661

[address] [place]



        09:45:00 09:45:00 t; ROEL MELGAR,           [place]abella [place] Nair II ans ANDREW, M.D. M.D.                                            

 

        2020-04-15 2020-04-15 Outpatient         Bui_Q_WAG VFP     VFP     [id number]

 [facility]



        04:44:00 04:44:00                                         [id number]   Family



                                                                        Practic



                                                                        e

 

        2020-04-15 2020-04-15 Outpatient         Bui_Q   VFP     VFP     [id number] [facility]



        04:44:00 04:44:00                                         [id number]   Family



                                                                        Practic



                                                                        e

 

        2020-04-15 2020-04-15 Outpatient         Bui_Q   VFP     VFP     [id number]

 [facility]



        04:44:00 04:44:00                                         05963   Family



                                                                        Practic



                                                                        e

 

        2020 Outpatient         Bui_Q_WAG VFP     VFP     [id number]

9-202 [facility]



        02:00:00 02:00:00                                         [id number]   Family



                                                                        Practic



                                                                        e

 

        2020 Michael Lira                 VFP     [place] -    2020

3 [facility]



        00:00:00 00:00:00 MD Sharon:                         [facility]         Famil

y



                        6122                            Medical [address]                            ([place], [place]                                              



                        [address]                                         



                        , Ph.                                           



                        [phone]                                         

 

        2020 Outpatient         Bui_Q_WAG VFP     VFP     [id number]

9202 [facility]



        11:39:00 11:39:00                                         90277   Family



                                                                        Practic



                                                                        e

 

        2020 Outpatient         Bui_Q   VFP     VFP     [id number]-

202 [facility]



        11:39:00 11:39:00                                         [id number]   Family



                                                                        Practic



                                                                        e

 

        2020 Outpatient         Bui_Q   VFP     VFP     [id number]-

202 [facility]



        10:59:00 10:59:00                                         57759   Family



                                                                        Practic



                                                                        e

 

        2020 Outpatient         Bui_Q   VFP     VFP     [id number]-

202 [facility]



        02:24:00 02:24:00                                         77028   Family



                                                                        Practic



                                                                        e

 

        2020-02-10 2020-02-10 Outpatient         Bui_Q_WAG VFP     VFP     [id number]

9202 [facility]



        10:21:00 10:21:00                                         [id number]   Family



                                                                        Practic



                                                                        e

 

        2020 Outpatient         Bui_Q_WAG VFP     VFP     [id number]

9202 [facility]



        12:23:00 12:23:00                                         [id number]   Family



                                                                        Practic



                                                                        e

 

        2020 Michael Lira                 VFP     [place] -    

[address] [place] [address]



        00:00:00 00:00:00 MD Sharon:                         [facility]         Famil

y



                        6122                            Medical [address]                            ([place], [place]                                              



                        [address]                                         



                        , Ph.                                           



                        [phone]                                         

 

        2020 [facility]         SRUTHILOLLY [facility]     Orthopedics 625

[address] [place]



        07:15:00 07:15:00 t; ROEL MELGAR,           - [place]         

CLINT Alex II ans ANDREW, M.D. M.D.                                            

 

        2020 Outpatient         Bui_Q   VFP     VFP     [id number] [facility]



        02:00:00 02:00:00                                         43876   Family



                                                                        Practic



                                                                        e

 

        2020 Outpatient         Bui_Q   VFP     VFP     [id number] [facility]



        02:00:00 02:00:00                                         28390   Family



                                                                        Practic



                                                                        e

 

        2020 Outpatient         Bui_Q_WAG VFP     VFP     [id number]

202 [facility]



        03:19:00 03:19:00                                         44190   Family



                                                                        Practic



                                                                        e

 

        2020 Outpatient         Bui_Q   VFP     VFP     [id number] [facility]



        09:06:00 09:06:00                                         [id number]   Family



                                                                        Practic



                                                                        e

 

        2020 Outpatient         Bui_Q_WAG VFP     VFP     [id number]

 [facility]



        09:13:00 09:13:00                                         72361   Family



                                                                        Practic



                                                                        e

 

        2020 Appointmen         ROEL GRAF     Orthopedics 623

[address] [place]



        07:15:00 07:15:00 t; ROEL MELGAR,           - [place]         

CLINT Alex II ans ANDREW, M.D. M.D.                                            

 

        2020-01-15 2020-01-15 Appointmen         ROEL GRAF     Orthopedics 579

[address] [place]



        07:00:00 07:00:00 t; ROEL MELGAR,           - [place]         

CLINT Alex II ans ANDREW, M.D. M.D.                                            

 

        2019 Michael Lira                 [facility]     [place] -    [id number]

2 [facility]



        00:00:00 00:00:00 MD Sharon:                         [facility]

y



                        9430                            Medical -         Practi

meli Moyer,                         _MARYBETHU_Pear         e



                        [address],                         [facility],                                         



                        [place]                                              



                        [address]                                         



                        , Ph.                                           



                        [phone]                                         

 

        2019-10-16 2019-10-16 Appointmen         ROEL GRAF     [place]     [id number]

5 [place]



        07:30:00 07:30:00 t; Balta PALOMO ANDREW, ans ANDREW, M.D. M.D.                                            

 

        2019-10-10 [facility]10[facility]         [facility]     [id number]

1 [place]



        15:00:00 15:00:00 t; Balta PALOMO ANDREW, ans ANDREW, M.D. M.D.                                            

 

        2019-10-01 2019-10-01 [facility]         MIREYANG [facility]     Orthopedics 573

[address] [place]



        07:15:00 07:15:00 t; ROEL MELGAR,           [facility]         

CLINT Alex ans ANDREW, M.D. M.D.                                            

 

        2019-09-10 2019-09-10 Michael Soraya                 [facility]     [place] -    [id number]

0 Village



        00:00:00 00:00:00 MD Sharon:                         [place]         Famil

y



                        94[address]                            [facility],                         Practice -         e



                        [address],                         [facility]               



                        [place]                                              



                        [address]                                         



                        , Ph.                                           



                        [phone]                                         

 

        2019 Michael Lira                 [facility]     [place] -    [id number]

6 [place]



        00:00:00 00:00:00 MD Sharon:                         Village         Famil

y



                        94[address]                            [facility],                         Practice -         e



                        [address],                         [facility]               



                        [place]                                              



                        [address]                                         



                        , Ph.                                           



                        [phone]                                         

 

        2019 [facility]         ROEL [facility]     Orthopedics 504

[address] [place]



        07:00:00 07:00:00 t; ROEL MELGAR,           at [place]        

 CLINT Alex ans ANDREW, M.D. M.D.                                            

 

        2019 [facility]     [place] -    [id number] 

[facility]



        00:00:00 00:00:00 [facility]         Family



                        Adejumo,                         Family          Practic



                        MD: 94[address]                         Practice -         e



                        [place],                         [facility]         



                        [address],                         d               



                        [place],                                         



                        [place]                                              



                        [address]                                         



                        , Ph.                                           



                        [phone]                                         

 

        2019 [facility]         ROEL [facility]     [id number]

0 [place]



        07:00:00 07:00:00 t; Balta PALOMO ANDREW, ans ANDREW, M.D. M.D.                                            

 

        2019 Appointmen         ROEL [facility]     [id number]

4 [place]



        07:00:00 07:00:00 t; Balta PALOMO ANDREW, ans ANDREW, M.D. M.D.                                            

 

        2019 Appointmen         MIREYANG [facility]     Orthopedics 503

[address] [place]



        07:15:00 07:15:00 t; ROEL MELGAR           [facility]W, [facility]. M.D.                                            

 

        [facility]     [place] [facility]    [id number]

[address] [place] [address]



        00:00:00 00:00:00 MD Sharon:                         [facility]

y



                        [address]                            [facility],                         [facility] -         e



                        [address],                         [facility]               



                        [place]                                              



                        [address]                                         



                        , Ph.                                           



                        [phone]                                         

 

        2018 Appointmen         ROEL [facility]     [id number]

0 [place]



        07:00:00 07:00:00 t; Balta PALOMO ANDREW, ans ANDREW, M.D. M.D.                                            

 

        2018 Appointmen         SRUTHI-LOLLY [facility]     [id number]

0 [place]



        07:45:00 07:45:00 t; Balta PALOMO ANDREW, ans ANDREW, M.D. M.D.                                            

 

        2018-10-31 2018-10-31 Appointmen         SRUTHI-LOLLY [facility]     Orthopedics 457

[address] [place]



        07:00:00 07:00:00 t; [facility]           [facility], M.[facility]. M.D.                                            

 

        2018 Appointmen         SRUTHI-LOLLY [facility]     Orthopedics 448

[address] [place]



        08:45:00 08:45:00 t; ROEL [facility]ELGAR           [facility] ANDREW, M.D. M.D.                                            

 

        2018 Appointmen         ROEL [facility] [id number]

99 [place]



        08:45:00 08:45:00 t; [facility]         CLINT [place],         Orthopedics         carine JARAMILLO M.D. M.D.                                            







Results







           Test Description Test Time  Test Comments Results    Result     [facility]

e



                                                       Comments   

 

           TISSUE EXAM 2022            Surgical Pathology Report          

  



                      13:52:21              Case: [id number]            



                                            Authorizing Provider:            



                                            Angel Loredo MD            



                                            Collected: 2022            



                                            04:39 PM Ordering            



                                            Location: [address] Received:            



                                            2022 05:15 PM            



                                            Service Pathologist:            



                                            Crissy Jordan MD Specimen:            



                                            Retroperitoneum, CT            



                                            Guided Retroperitoneal            



                                            Mass Biopsy SOFT TISSUE,            



                                            RETROPERITONEUM, BIOPSY:-           

 



                                            DENSE FIBROSIS WITH FOCAL           

 



                                            ATYPICAL LYMPHOID            



                                            AGGREGATES (SEE COMMENT)            



                                            Signing Pathologist            



                                            Direct Phone Line:            



                                            [phone]           

 



                                            y signed by Crissy Jordan MD on            



                                            2022 at 1:52            



                                            PMPreliminary result            



                                            electronically signed by            



                                            Jailene Maria MD on            



                                            2022 at 11:41            



                                            AMSections show dense            



                                            fibrous tissue with focal           

 



                                            areas containing a            



                                            lymphoid infiltrate            



                                            comprised of small mature           

 



                                            lymphoid cells.            



                                            Immunohistochemical            



                                            studies highlight admixed           

 



                                            T and B lymphoid cells.            



                                            There is crush artifact            



                                            and diminished tissue in            



                                            deeper stained sections.            



                                            No definitive feature of            



                                            a hematolymphoid neoplasm           

 



                                            is identified; however,            



                                            the tissue is limited.            



                                            Clinical correlation and            



                                            close follow up are            



                                            recommended. Further            



                                            tissue sampling is            



                                            recommended, if            



                                            clinically indicated and            



                                            if there is high            



                                            suspicion for involvement           

 



                                            by a hematolymphoid            



                                            neoplasm, including            



                                            tissue submission for            



                                            flow cytometry and            



                                            cytogenetics for complete           

 



                                            assessment.The results of           

 



                                            this case were discussed            



                                            with Dr. Mcrae on            



                                            2022 at 12:06            



                                            PM.[id number]; 93843; 88341 x            



                                            16; 13435;            



                                            [id number]            



                                            massRetroperitoneumA.            



                                            Retroperitoneum.Received            



                                            in formalin labeled with            



                                            the patient's name,            



                                            medical record number and           

 



                                            "retroperitoneal mass"            



                                            are multiple tan,            



                                            threadlike soft tissue            



                                            cores measuring up to 1.1           

 



                                            cm in greatest length,            



                                            which are submitted in            



                                            toto in A1.PATI Sauer, HT            



                                            (ASCP)Sections of the            



                                            retroperitoneal biopsies            



                                            show cores of tissue with           

 



                                            marked dense fibrosis,            



                                            with focal areas with an            



                                            infiltrate of small            



                                            mature lymphoid cells            



                                            with scant cytoplasm and            



                                            condensed nuclear            



                                            chromatin. There is crush           

 



                                            artifact in focal areas.            



                                            Immunohistochemical and            



                                            in situ hybridization            



                                            studies are performed            



                                            with appropriate            



                                            controls. CD3 and CD20            



                                            highlight admixed T and B           

 



                                            lymphocytes. CD10 appears           

 



                                            focally positive in            



                                            lymphoid cells. A subset            



                                            of lymphoid cells stains            



                                            positive for BCL2. PAX5            



                                            is weakly positive in            



                                            focal B cells. MUM1,            



                                            , CD21, CD23, and            



                                            Cyclin D1 stains are            



                                            negative. Ki-67,            



                                            kappa-LEN, and lambda-LEN           

 



                                            are non-contributory.            



                                            Cytokeratin Ae1/Ae3 is            



                                            negative. CD34 highlights           

 



                                            background vascular            



                                            channels. Desmin and            



                                            smooth muscle actin are            



                                            positive in focal            



                                            muscular tissue.The            



                                            interpretation of this            



                                            case included the use of            



                                            immunohistochemistry or            



                                            special stains.A1: CD3,            



                                            CD20, PAX5, CD5, CD10,            



                                            BCL6, BCL2, MUM1, ,            



                                            CD21, CD23, Cyclin D1,            



                                            Ki-67, Kappa-LEN,            



                                            Lambda-LEN, CD34, SMA,            



                                            Desmin, Ae1/Ft6Fxhkoyk            



                                            Slides Examined: In-house           

 



                                            known positive controls            



                                            were evaluated along with           

 



                                            the test tissue. These            



                                            control slides run            



                                            alongside of the patients           

 



                                            sample show appropriate            



                                            staining. Internal            



                                            positive and negative            



                                            controls when available            



                                            are evaluated            



                                            Immunohistochemistry            



                                            technical testing was            



                                            performed at [facility],            



                                            Pathology Laboratory            



                                            where it was developed            



                                            and its performance            



                                            characteristics were            



                                            determined. It has not            



                                            been cleared or approved            



                                            by the U.S. Food and Drug           

 



                                            Administration. The FDA            



                                            has determined that such            



                                            clearance or approval is            



                                            not necessary. The test            



                                            is used for clinical            



                                            purposes. It should not            



                                            be regarded as            



                                            investigational or for            



                                            research. This laboratory           

 



                                            is certified under the            



                                            Clinical Laboratory            



                                            Improvement Amendments of           

 



                                            1988 (CLIA-88) as            



                                            qualified to perform high           

 



                                            complexity clinical            



                                            laboratory testing.[facility], Department of            



                                            Pathology, [address] [place] [address]            



                                            [id number], Tel            



                                            [phone],            



                                            Department of Pathology,            



                                            [address] [place] [address], Tel            



                                            [phone],            



                                            Department of Pathology,            



                                            [address] [place] [address], Tel            



                                            [phone]            









                    POCT-GLUCOSE METER  2022 12:28:15 









                      Test Item  Value      Reference Range Interpretation Comme

nts









             POC-GLUCOSE METER (BEAKER) 247 mg/dL           H            :

 TESTED AT [facility] [address]



             (test code = 1538)                                        [facility]

X, [id number]:



                                                                 /Techni

matilda ID = [id number] for



                                                                 Sarthak Guillen



POCT-GLUCOSE [id number] 08:39:59





             Test Item    Value        Reference Range Interpretation Comments

 

             POC-GLUCOSE METER 163 mg/dL           H            : TESTED A

T [facility] [address]



             (BEAKER) (test code =                                        [place]

[place],



             1538)                                               [id number]:



                                                                 /Techni

matilda ID



                                                                 = [id number] for Blanka Freedman



BASIC METABOLIC [id number] 04:47:06





             Test Item    Value        Reference Range Interpretation Comments

 

             SODIUM (BEAKER) 138 meq/L    136-145                   



             (test code = 381)                                        

 

             POTASSIUM    5.2 meq/L    3.5-5.1      H            



             (BEAKER) (test                                        



             code = 379)                                         

 

             CHLORIDE (BEAKER) 106 meq/L                        



             (test code = 382)                                        

 

             CO2 (BEAKER) 23 meq/L     22-29                     



             (test code = 355)                                        

 

             BLOOD UREA   39 mg/dL     7-21         H            



             NITROGEN (BEAKER)                                        



             (test code = 354)                                        

 

             CREATININE   2.37 mg/dL   0.57-1.25    H            



             (BEAKER) (test                                        



             code = 358)                                         

 

             GLUCOSE RANDOM 175 mg/dL           H            



             (BEAKER) (test                                        



             code = 652)                                         

 

             CALCIUM (BEAKER) 8.3 mg/dL    8.4-10.2     L            



             (test code = 697)                                        

 

             EGFR (BEAKER) 29                                      Interpretatio

n of eGFR



             (test code = mL/min/1.73                            values Stage De

scription



             1092)        sq m                                   Result G1 Leah

l or high



                                                                 >=90 G2 Mildly 

decreased



                                                                 60-89 G3a Mildl

y to



                                                                 moderately 45-5

9 G3b



                                                                 Moderately to s

everely



                                                                 30-44 G4 Severl

y decreased



                                                                 15-29 G5 Kidney

 failure



                                                                 <15Reported eGF

R is based



                                                                 on the CKD-EPI 





                                                                 equation that d

oes not use



                                                                 a race



                                                                 coefficientEsti

mated GFR



                                                                 is not as accur

ate as



                                                                 Creatinine Tamiko

latosha in



                                                                 predicting glom

erular



                                                                 filtration rate

. Estimated



                                                                 GFR is not appl

icable for



                                                                 dialysis patien

ts



 ID - JULIA SPARROWBC (HEMOGRAM ONLY)2022 04:17:48





             Test Item    Value        Reference Range Interpretation Comments

 

             WHITE BLOOD CELL COUNT (BEAKER) 15.4 K/ L    3.5-10.5     H        

    



             (test code = 775)                                        

 

             RED BLOOD CELL COUNT (BEAKER) 3.27 M/ L    4.63-6.08    L          

  



             (test code = 761)                                        

 

             HEMOGLOBIN (BEAKER) (test code = 8.7 GM/DL    13.7-17.5    L       

     



             410)                                                

 

             HEMATOCRIT (BEAKER) (test code = 27.6 %       40.1-51.0    L       

     



             411)                                                

 

             MEAN CORPUSCULAR VOLUME (BEAKER) 84.4 fL      79.0-92.2            

     



             (test code = 753)                                        

 

             MEAN CORPUSCULAR HEMOGLOBIN 26.6 pg      25.7-32.2                 



             (BEAKER) (test code = 751)                                        

 

             MEAN CORPUSCULAR HEMOGLOBIN CONC 31.5 GM/DL   32.3-36.5    L       

     



             (BEAKER) (test code = 752)                                        

 

             RED CELL DISTRIBUTION WIDTH 14.5 %       11.6-14.4    H            



             (BEAKER) (test code = 412)                                        

 

             PLATELET COUNT (BEAKER) (test 230 K/CU MM  150-450                 

  



             code = 756)                                         

 

             MEAN PLATELET VOLUME (BEAKER) 9.8 fL       9.4-12.4                

  



             (test code = 754)                                        

 

             NUCLEATED RED BLOOD CELLS 0 /100 WBC   0-0                       



             (BEAKER) (test code = 413)                                        



POCT-GLUCOSE [id number] 21:15:36





             Test Item    Value        Reference Range Interpretation Comments

 

             POC-GLUCOSE METER 365 mg/dL           H            : TESTED A

T [facility] [address]



             (DEEJULY) (test code =                                        [facility],



             153)                                               [id number]:



                                                                 /Techni

matilda ID



                                                                 = [id number] for HALEIGH VELÁZQUEZ



POCT-GLUCOSE METER2022-08-15 17:34:55





             Test Item    Value        Reference Range Interpretation Comments

 

             POC-GLUCOSE METER 431 mg/dL           HH           : Notified

 RN/MD:



             (CHARISSE) (test code =                                        TESTED

 AT [facility] [address]



             1538)                                               [facility],



                                                                 [id number]:



                                                                 /Techni

matilda ID



                                                                 = [id number] for DUGLAS KRAFT



CT, BIOPSY, ABDOMEN2022-08-15 16:04:00Reason for exam:-&gt;bilateral nephro 
ureteral tube placement - please discuss with urology Dr. torre with any 
questions. Brilinta held starting 8/7 PM Reason for exam:-&gt;biopsy of 
retroperitoneal mass/lymphadenopathy/fibrosis
************************************************************[facility]AYOR : 1955 Sex: 
M************************************************************FINAL REPORT 
PATIENT ID: [id number] CT guided soft tissue mass biopsy History: bilateral nephro
ureteral tube placement - please discuss with urology Dr. torre with any 
questions. Brilinta held starting 8 PMbiopsy of retroperitoneal 
mass/lymphadenopathy/fibrosis Modality: CT, CT fluoroscopy Anesthesia: 1%
lidocaine : Dr. Julius Mcrae Approach: Right retroperitoneal 
percutaneous Consent: The risks, benefits, and alternatives to the procedure 
were discussed with the patient. The patient expressed understanding and 
informed written consent was obtained. Time out: A pre-procedure time out was p
erformed in order to confirm the appropriate site of the procedure. Sedation: 
Moderate sedation was administered. 1 mg of Versed and 50 mcg of fentanyl IV was
used for moderate sedation monitored undermy direction. Total intra-service time
of sedation was 1 minutes. The patient's vital signs were monitored throughout 
the procedure and recorded in the patient's medical record by the nurse. 
Technique:Dose modulation, iterative reconstruction, and/or weight-based 
adjustment of the mA/kV was utilized to reduce the radiation dose to as low as 
reasonably achievable.The patient was placed prone in the CT scanner. 
Retroperitoneal mass was localized using CT and CT fluoroscopy. After the usual 
sterile preparation and application of local anesthesia, a 17-gauge coaxial 
guide needle was advanced into right retroperitoneum using CT guidance. Using an
18-gauge Temno needle via the coaxial guide needle a total of 4 core biopsy 
specimens were obtained and sent to pathology. The needle was removed and a 
sterile dressing was applied. Disposition: The patient tolerated the procedure 
well, without immediate complications. The patient left CT in stable condition. 
Impression: 1. Technically successful CT guidedretroperitoneal mass biopsy with 
conscious sedation. Signed: Julius Mcrae Verified Date/Time: 08/15/2022
16:04:22 Reading Location: [facility] Radiology Reading Room Electronically signed 
by: ANNA MCRAE MD on 08/15/2022 04:04 PMPOCT-GLUCOSE METER2022-08-15 
12:12:30





             Test Item    Value        Reference Range Interpretation Comments

 

             POC-GLUCOSE METER 212 mg/dL           H            : TESTED A

T [facility] [address]



             (BEAKER) (test code =                                        [facility]

[employer] [facility],



             1538)                                               [id number]:



                                                                 /Techni

matilda ID



                                                                 = [id number] for FA

DUGLAS PERALTA



POCT-GLUCOSE [id number] 08:57:21





             Test Item    Value        Reference Range Interpretation Comments

 

             POC-GLUCOSE METER 177 mg/dL           H            : TESTED A

T [facility] [address]



             (BEAKER) (test code =                                        [employer]

[employer] [facility],



             1538)                                               [id number]:



                                                                 /Techni

matilda ID



                                                                 = [id number] for FA

KATHRYN



                                                                 DUGLAS



BASIC METABOLIC [id number] 06:46:29





             Test Item    Value        Reference Range Interpretation Comments

 

             SODIUM (BEAKER) 136 meq/L    136-145                   



             (test code = 381)                                        

 

             POTASSIUM    4.6 meq/L    3.5-5.1                   



             (BEAKER) (test                                        



             code = 379)                                         

 

             CHLORIDE (BEAKER) 105 meq/L                        



             (test code = 382)                                        

 

             CO2 (BEAKER) 23 meq/L     22-29                     



             (test code = 355)                                        

 

             BLOOD UREA   34 mg/dL     7-21         H            



             NITROGEN (BEAKER)                                        



             (test code = 354)                                        

 

             CREATININE   2.20 mg/dL   0.57-1.25    H            



             (BEAKER) (test                                        



             code = 358)                                         

 

             GLUCOSE RANDOM 215 mg/dL           H            



             (BEAKER) (test                                        



             code = 652)                                         

 

             CALCIUM (BEAKER) 8.4 mg/dL    8.4-10.2                  



             (test code = 697)                                        

 

             EGFR (BEAKER) 32                                      Interpretatio

n of eGFR



             (test code = mL/min/1.73                            values Stage De

scription



             1092)        sq m                                   Result G1 Leah

l or high



                                                                 >=90 G2 Mildly 

decreased



                                                                 60-89 G3a Mildl

y to



                                                                 moderately 45-5

9 G3b



                                                                 Moderately to s

everely



                                                                 30-44 G4 Severl

y decreased



                                                                 15-29 G5 Kidney

 failure



                                                                 <15Reported eGF

R is based



                                                                 on the CKD-EPI 





                                                                 equation that d

oes not use



                                                                 a race



                                                                 coefficientEsti

mated GFR



                                                                 is not as accur

ate as



                                                                 Creatinine Tamiko reina in



                                                                 predicting glom

erular



                                                                 filtration rate

. Estimated



                                                                 GFR is not appl

icable for



                                                                 dialysis patien

ts



 ID - PIAYA LCBC (HEMOGRAM ONLY)2022-08-15 04:16:05





             Test Item    Value        Reference Range Interpretation Comments

 

             WHITE BLOOD CELL COUNT (BEAKER) 14.2 K/ L    3.5-10.5     H        

    



             (test code = 775)                                        

 

             RED BLOOD CELL COUNT (BEAKER) 3.42 M/ L    4.63-6.08    L          

  



             (test code = 761)                                        

 

             HEMOGLOBIN (BEAKER) (test code = 9.4 GM/DL    13.7-17.5    L       

     



             410)                                                

 

             HEMATOCRIT (BEAKER) (test code = 28.9 %       40.1-51.0    L       

     



             411)                                                

 

             MEAN CORPUSCULAR VOLUME (BEAKER) 84.5 fL      79.0-92.2            

     



             (test code = 753)                                        

 

             MEAN CORPUSCULAR HEMOGLOBIN 27.5 pg      25.7-32.2                 



             (BEAKER) (test code = 751)                                        

 

             MEAN CORPUSCULAR HEMOGLOBIN CONC 32.5 GM/DL   32.3-36.5            

     



             (BEAKER) (test code = 752)                                        

 

             RED CELL DISTRIBUTION WIDTH 14.4 %       11.6-14.4                 



             (BEAKER) (test code = 412)                                        

 

             PLATELET COUNT (BEAKER) (test 230 K/CU MM  150-450                 

  



             code = 756)                                         

 

             MEAN PLATELET VOLUME (BEAKER) 10.1 fL      9.4-12.4                

  



             (test code = 754)                                        

 

             NUCLEATED RED BLOOD CELLS 0 /100 WBC   0-0                       



             (BEAKER) (test code = 413)                                        



POCT-GLUCOSE [id number] 21:25:45





             Test Item    Value        Reference Range Interpretation Comments

 

             POC-GLUCOSE METER 358 mg/dL           H            : TESTED A

[facility] [address]



             (BEAKER) (test code =                                        [place],



             1538)                                               [id number]:



                                                                 /Techni

matilda ID



                                                                 = [id number] for CONNER LY



POCT-GLUCOSE [id number] 17:24:21





             Test Item    Value        Reference Range Interpretation Comments

 

             POC-GLUCOSE METER 334 mg/dL           H            : Will Rep

eat Test:



             (BEAKER) (test code =                                        Notifi

solitario RN/MD: TESTED



             )                                               AT [facility] [address]

[facility], [address]

30:



                                                                 /Techni

matilda ID



                                                                 = [id number] for AMY CONTRERAS



POCT-GLUCOSE [id number] 13:44:03





             Test Item    Value        Reference Range Interpretation Comments

 

             POC-GLUCOSE METER 206 mg/dL           H            : TESTED A

T [facility] [address]



             (BEAKER) (test code =                                        [facility],



             153)                                               [address]:



                                                                 /Techni

matilda ID



                                                                 = [id number] for AMY CONTRERAS



POCT-GLUCOSE [id number] 13:43:23





             Test Item    Value        Reference Range Interpretation Comments

 

             POC-GLUCOSE METER 176 mg/dL           H            : TESTED A

T [facility] [address]



             (BEAKER) (test code =                                        [facility],



             153)                                               [id number]:



                                                                 /Techni

matilda ID



                                                                 = [id number] for AMY CONTRERAS



BASIC METABOLIC [id number] 08:57:05





             Test Item    Value        Reference Range Interpretation Comments

 

             SODIUM (BEAKER) 139 meq/L    136-145                   



             (test code = 381)                                        

 

             POTASSIUM    4.3 meq/L    3.5-5.1                   



             (BEAKER) (test                                        



             code = 379)                                         

 

             CHLORIDE (BEAKER) 108 meq/L           H            



             (test code = 382)                                        

 

             CO2 (BEAKER) 22 meq/L     22-29                     



             (test code = 355)                                        

 

             BLOOD UREA   33 mg/dL     7-21         H            



             NITROGEN (BEAKER)                                        



             (test code = 354)                                        

 

             CREATININE   1.99 mg/dL   0.57-1.25    H            



             (BEAKER) (test                                        



             code = 358)                                         

 

             GLUCOSE RANDOM 149 mg/dL           H            



             (BEAKER) (test                                        



             code = 652)                                         

 

             CALCIUM (BEAKER) 8.3 mg/dL    8.4-10.2     L            



             (test code = 697)                                        

 

             EGFR (BEAKER) 36                                      Interpretatio

n of eGFR



             (test code = mL/min/1.73                            values Stage De

scription



             1092)        sq m                                   Result G1 Leah

l or high



                                                                 >=90 G2 Mildly 

decreased



                                                                 60-89 G3a Mildl

y to



                                                                 moderately 45-5

9 G3b



                                                                 Moderately to s

everely



                                                                 30-44 G4 Severl

y decreased



                                                                 15-29 G5 Kidney

 failure



                                                                 <15Reported eGF

R is based



                                                                 on the CKD-EPI 





                                                                 equation that d

oes not use



                                                                 a race



                                                                 coefficientEsti

mated GFR



                                                                 is not as accur

ate as



                                                                 Creatinine Tamiko

latosha in



                                                                 predicting glom

erular



                                                                 filtration rate

. Estimated



                                                                 GFR is not appl

icable for



                                                                 dialysis patien

ts



 ID - PIAYA LCBC (HEMOGRAM ONLY)2022 06:21:41





             Test Item    Value        Reference Range Interpretation Comments

 

             WHITE BLOOD CELL COUNT (BEAKER) 11.8 K/ L    3.5-10.5     H        

    



             (test code = 775)                                        

 

             RED BLOOD CELL COUNT (BEAKER) 3.28 M/ L    4.63-6.08    L          

  



             (test code = 761)                                        

 

             HEMOGLOBIN (BEAKER) (test code = 8.9 GM/DL    13.7-17.5    L       

     



             410)                                                

 

             HEMATOCRIT (BEAKER) (test code = 26.5 %       40.1-51.0    L       

     



             411)                                                

 

             MEAN CORPUSCULAR VOLUME (BEAKER) 80.8 fL      79.0-92.2            

     



             (test code = 753)                                        

 

             MEAN CORPUSCULAR HEMOGLOBIN 27.1 pg      25.7-32.2                 



             (BEAKER) (test code = 751)                                        

 

             MEAN CORPUSCULAR HEMOGLOBIN CONC 33.6 GM/DL   32.3-36.5            

     



             (BEAKER) (test code = 752)                                        

 

             RED CELL DISTRIBUTION WIDTH 14.3 %       11.6-14.4                 



             (BEAKER) (test code = 412)                                        

 

             PLATELET COUNT (BEAKER) (test 217 K/CU MM  150-450                 

  



             code = 756)                                         

 

             MEAN PLATELET VOLUME (BEAKER) 10.1 fL      9.4-12.4                

  



             (test code = 754)                                        

 

             NUCLEATED RED BLOOD CELLS 0 /100 WBC   0-0                       



             (BEAKER) (test code = 413)                                        



POCT-GLUCOSE [id number] 04:26:31





             Test Item    Value        Reference Range Interpretation Comments

 

             POC-GLUCOSE METER 292 mg/dL           H            : TESTED A

T [facility] [address]



             (BEAKER) (test code =                                        [place],



             1538)                                               [address]:



                                                                 /Techni

matilda ID



                                                                 = [id number] for 

MEENAKSHI



                                                                 CONNER



CT, [id number] 21:44:00Unlisted Reason for Exam - Click Yes and Enter 
Reason Below-&gt;YesUnlisted Reason for Exam-&gt;F.U size of RP mass s/p 
steroids. R PCN with minimal UOPIs this for enterography?-&gt;NoPlease 
specify:-&gt;RenalWill this procedure require oral contrast?-&gt;No
************************************************************[facility]R HÉCTOR : 1955 Sex: 
M************************************************************FINAL REPORT 
PATIENT ID: [id number] CT, ABDOMEN \T\ PELVIS, WITHOUT IV CONTRAST CLINICAL 
STATEMENT: Right-sided nephrostomy. COMPARISON: CT abdomen pelvis dated 
2022. Dose modulation, iterative reconstruction, and/or weight-based 
adjustment of the mA/kV was utilized to reduce the radiation dose to as low as 
reasonably achievable. FINDINGS: Visualized lung bases are within normal limits.
Liver, spleen, pancreas, gallbladder, adrenal glands are within normal limits. 
No biliary dilatation. No significanthydronephrosis. Bilateral nephrostomy is in
place. They are in appropriate position. No dilated loops of bowel to suggest 
obstruction. Mild amount of stool in the colon. The appendix is normal. No free
fluid or free air. Moderate retroperitoneal and pelvic lymphadenopathy, similar 
to prior study. No free fluid or free air. Bladder is unremarkable. IMPRESSION: 
Bilateral nephrostomy in place with resolution of previously seen 
hydronephrosis. No ascites. Moderate retroperitoneal lymphadenopathy is stable 
in appearance. Signed: Kellen Almanzaeport Verified Date/Time: 2022 
21:44:26 Electronicallysigned by: KELLEN ALMANZA on 2022 09:44 PMPOCT-
GLUCOSE [id number] 17:49:22





             Test Item    Value        Reference Range Interpretation Comments

 

             POC-GLUCOSE METER 302 mg/dL           H            : TESTED A

T [facility] [address]



             (BEAKER) (test code =                                        [facility],



             1538)                                               [address]:



                                                                 /Techni

matilda ID



                                                                 = [id number] for AMY CONTRERAS



POCT-GLUCOSE [id number] 15:09:04





             Test Item    Value        Reference Range Interpretation Comments

 

             POC-GLUCOSE METER 224 mg/dL           H            : TESTED A

T [facility] [address]



             (BEAKER) (test code =                                        [facility],



             1538)                                               [id number]:



                                                                 /Techni

matilda ID



                                                                 = [id number] for AMY CONTRERAS



POCT-GLUCOSE [id number] 08:54:24





             Test Item    Value        Reference Range Interpretation Comments

 

             POC-GLUCOSE METER 187 mg/dL           H            : TESTED A

T [facility] [address]



             (BEAKER) (test code =                                        [facility],



             1538)                                               [address]:



                                                                 /Techni

matilda ID



                                                                 = [id number] for AMY CONTRERAS



BASIC METABOLIC [id number] 06:18:58





             Test Item    Value        Reference Range Interpretation Comments

 

             SODIUM (BEAKER) 134 meq/L    136-145      L            



             (test code = 381)                                        

 

             POTASSIUM    3.8 meq/L    3.5-5.1                   



             (BEAKER) (test                                        



             code = 379)                                         

 

             CHLORIDE (BEAKER) 103 meq/L                        



             (test code = 382)                                        

 

             CO2 (BEAKER) 23 meq/L     22-29                     



             (test code = 355)                                        

 

             BLOOD UREA   37 mg/dL     7-21         H            



             NITROGEN (BEAKER)                                        



             (test code = 354)                                        

 

             CREATININE   2.11 mg/dL   0.57-1.25    H            



             (BEAKER) (test                                        



             code = 358)                                         

 

             GLUCOSE RANDOM 244 mg/dL           H            



             (BEAKER) (test                                        



             code = 652)                                         

 

             CALCIUM (BEAKER) 8.0 mg/dL    8.4-10.2     L            



             (test code = 697)                                        

 

             EGFR (BEAKER) 34                                      Interpretatio

n of eGFR



             (test code = mL/min/1.73                            values Stage De

scription



             1092)        sq m                                   Result G1 Leah

l or high



                                                                 >=90 G2 Mildly 

decreased



                                                                 60-89 G3a Mildl

y to



                                                                 moderately 45-5

9 G3b



                                                                 Moderately to s

everely



                                                                 30-44 G4 Severl

y decreased



                                                                 15-29 G5 Kidney

 failure



                                                                 <15Reported eGF

R is based



                                                                 on the CKD-EPI 





                                                                 equation that d

oes not use



                                                                 a race



                                                                 coefficientEsti

mated GFR



                                                                 is not as accur

ate as



                                                                 Creatinine Tamiko reina in



                                                                 predicting glom

erular



                                                                 filtration rate

. Estimated



                                                                 GFR is not appl

icable for



                                                                 dialysis patien

ts



 ID - PATIENCE [facility] (HEMOGRAM ONLY)2022 05:35:16





             Test Item    Value        Reference Range Interpretation Comments

 

             WHITE BLOOD CELL COUNT (BEAKER) 10.5 K/ L    3.5-10.5              

    



             (test code = 775)                                        

 

             RED BLOOD CELL COUNT (BEAKER) 3.24 M/ L    4.63-6.08    L          

  



             (test code = 761)                                        

 

             HEMOGLOBIN (BEAKER) (test code = 8.7 GM/DL    13.7-17.5    L       

     



             410)                                                

 

             HEMATOCRIT (BEAKER) (test code = 27.2 %       40.1-51.0    L       

     



             411)                                                

 

             MEAN CORPUSCULAR VOLUME (BEAKER) 84.0 fL      79.0-92.2            

     



             (test code = 753)                                        

 

             MEAN CORPUSCULAR HEMOGLOBIN 26.9 pg      25.7-32.2                 



             (BEAKER) (test code = 751)                                        

 

             MEAN CORPUSCULAR HEMOGLOBIN CONC 32.0 GM/DL   32.3-36.5    L       

     



             (BEAKER) (test code = 752)                                        

 

             RED CELL DISTRIBUTION WIDTH 14.6 %       11.6-14.4    H            



             (BEAKER) (test code = 412)                                        

 

             PLATELET COUNT (BEAKER) (test 186 K/CU MM  150-450                 

  



             code = 756)                                         

 

             MEAN PLATELET VOLUME (BEAKER) 10.0 fL      9.4-12.4                

  



             (test code = 754)                                        

 

             NUCLEATED RED BLOOD CELLS 0 /100 WBC   0-0                       



             (BEAKER) (test code = 413)                                        



POCT-GLUCOSE [id number] 21:15:58





             Test Item    Value        Reference Range Interpretation Comments

 

             POC-GLUCOSE METER 352 mg/dL           H            : TESTED A

T [facility] [address]



             (BEAKER) (test code =                                        [place],



             1538)                                               [address]:



                                                                 /Techni

matilda ID



                                                                 = [id number] for CONNER LY



POCT-GLUCOSE [id number] 17:48:20





             Test Item    Value        Reference Range Interpretation Comments

 

             POC-GLUCOSE METER 246 mg/dL           H            : TESTED A

T [facility] [address]



             (BEAKER) (test code =                                        [place],



             1538)                                               [address]:



                                                                 /Techni

matilda ID



                                                                 = [id number] for AMY CONTRERAS, NEPHROSTOGRAM, STENT, NEW [id number] 14:29:00Reason for exam:-
&gt;bilateral nephro ureteral tube placement - please discuss with urology Dr. torre with any questions. Brilinta held starting 8/7 PM Reason for exam:-
&gt;biopsy of retroperitoneal mass/lymphadenopathy/fibrosis
************************************************************[facility] : 1955 Sex: 
M************************************************************FINAL REPORT 
PATIENT ID: [id number] Bilateral percutaneous nephrostomy catheter placement 
Clinical History: Bilateral hydronephrosis. Modality: Sonography and fluoroscopy
: Surendra Pena MD. Assistant: None. Sedation: Moderate sedation
was administered. 2 mg of Versed and 100 mcg of fentanylIV was used for moderate
sedation monitored under my direction. Total intra-service time of sedationwas 
60 minutes. The patient's vital signs were monitored throughout the procedure 
and recorded in the patient's medical record by the nurse. Estimated Blood Loss:
Less than 5 cc. Specimen: None. Fluoroscopy Time: 1.4 min.Reference Air Kerma 
(Ka, r): 4.2 mGy. Technique: Informed written consent was obtained. Discussion 
of risks, benefits, and alternatives were made with the patient. The patient 
expressed understanding and agreed to proceed. A universal timeout was performed
prior to starting the procedure. All elements maximal sterile barrier technique 
was utilized for this procedure, including utilization of sterile scrub solution
for skin prep, a large sterile sheet to cover the areas of the patient that were
not prepped, and hand hygiene, mask, head covering, and sterile gown for 
performing radiologist and scrub technologist. Initial ultrasound images 
demonstrate mild to moderate right-sided hydronephrosis. 2% lidocaine was used 
for local anesthesia. Using ultrasound guidance, a 21-gauge Chiba needle was 
advanced into an inferior pole calyx. Injection of contrast return of urine 
confirmed intraluminal positioning. A 0.018 inch wire was advanced through the 
needle a curled within the pelvis.The Accustick sheath was advanced over the 
wire and an Amplatz wire was advanced down the proximal ureter. After a small 
skin incision was made, the soft tissue tract was dilated and a 8.5 French 
drainage catheter was advanced over wire with pigtail formed within the right 
renal pelvis. Contrast injection confirmed appropriate positioning. The catheter
was fixed to the skin with silk suture and attached to gravity drainage. A 
sterile dressing was applied. Initial ultrasound images demonstrate mild to 
moderate left-sided hydronephrosis. 2% lidocaine was used for local anesthesia. 
Using ultrasound guidance, a 21-gauge Chiba needle was advanced into an inferior
pole calyx. Injection of contrast return of urine confirmed intraluminal 
positioning. A 0.018 inch wire was advanced through the needle a curled within 
the pelvis. The Accustick sheath was advanced over the wire and an Amplatz wire 
was advanced down the proximal ureter. After a small skin incision was made, the
soft tissue tract was dilatedand a 8.5 French drainage catheter was advanced 
over wire with pigtail formed within the left renal pelvis. Contrast injection 
confirmed appropriate positioning. The catheter was fixed to the skin withsilk 
suture and attached to gravity drainage. A sterile dressing was applied. The 
patient tolerated the procedure well without immediate complication. Please note
initial percutaneous nephrostomy catheter placement are requested. However, 
given recent anticoagulation will defer conversion to nephroureterostomy 
catheters after output from nephrostomy catheters are noted to be clear. This 
can be performed as an outpatient. Impression: Successful and uncomplicated 
ultrasound/fluoroscopic guided bilateral percutaneous nephrostomy catheter 
placement as above. Signed: Surendra Penaort Verified Date/Time: 
2022 14:29:34 Reading Location: [facility] P048 Angio Body Reading Room 
Electronically signed by: SURENDRA PENA MD on 2022 02:29 PMPOCT-GLUCOSE
[id number] 12:41:19





             Test Item    Value        Reference Range Interpretation Comments

 

             POC-GLUCOSE METER 152 mg/dL           H            : TESTED A

T [facility] [address]



             (BEAKER) (test code =                                        [place],



             1538)                                               [address]:



                                                                 /Techni

matilda ID



                                                                 = [id number] for AMY CONTRERAS



POCT-GLUCOSE [id number] 07:40:19





             Test Item    Value        Reference Range Interpretation Comments

 

             POC-GLUCOSE METER 149 mg/dL           H            : TESTED A

T [facility] [address]



             (BEAKER) (test code =                                        [place]IN[place],



             1538)                                               [address]:



                                                                 /Techni

matilda ID



                                                                 = [id number] for AMY CONTRERAS



BASIC METABOLIC [id number] 04:18:36





             Test Item    Value        Reference Range Interpretation Comments

 

             SODIUM (BEAKER) 137 meq/L    136-145                   



             (test code = 381)                                        

 

             POTASSIUM    4.3 meq/L    3.5-5.1                   



             (BEAKER) (test                                        



             code = 379)                                         

 

             CHLORIDE (BEAKER) 106 meq/L                        



             (test code = 382)                                        

 

             CO2 (BEAKER) 24 meq/L     22-29                     



             (test code = 355)                                        

 

             BLOOD UREA   33 mg/dL     7-21         H            



             NITROGEN (BEAKER)                                        



             (test code = 354)                                        

 

             CREATININE   2.18 mg/dL   0.57-1.25    H            



             (BEAKER) (test                                        



             code = 358)                                         

 

             GLUCOSE RANDOM 241 mg/dL           H            



             (BEAKER) (test                                        



             code = 652)                                         

 

             CALCIUM (BEAKER) 8.4 mg/dL    8.4-10.2                  



             (test code = 697)                                        

 

             EGFR (BEAKER) 32                                      Interpretatio

n of eGFR



             (test code = mL/min/1.73                            values Stage De

scription



             1092)        sq m                                   Result G1 Leah

l or high



                                                                 >=90 G2 Mildly 

decreased



                                                                 60-89 G3a Mildl

y to



                                                                 moderately 45-5

9 G3b



                                                                 Moderately to s

everely



                                                                 30-44 G4 Severl

y decreased



                                                                 15-29 G5  Kidne

y failure



                                                                 <15Reported eGF

R is based



                                                                 on the CKD-EPI 

2021



                                                                 equation that d

oes not use



                                                                 a race



                                                                 coefficientEsti

mated GFR



                                                                 is not as accur

ate as



                                                                 Creatinine Tamiko reina in



                                                                 predicting glom

erular



                                                                 filtration rate

. Estimated



                                                                 GFR is not appl

icable for



                                                                 dialysis patien

ts



 ID - PATIENCE [id number] 04:05:10





             Test Item    Value        Reference Range Interpretation Comments

 

             PARTIAL THROMBOPLASTIN TIME 29.6 seconds 22.5-36.0                 



             (BEAKER) (test code = 760)                                        



CBC (HEMOGRAM ONLY)2022 03:54:48





             Test Item    Value        Reference Range Interpretation Comments

 

             WHITE BLOOD CELL COUNT (BEAKER) 14.4 K/ L    3.5-10.5     H        

    



             (test code = 775)                                        

 

             RED BLOOD CELL COUNT (BEAKER) 3.55 M/ L    4.63-6.08    L          

  



             (test code = 761)                                        

 

             HEMOGLOBIN (BEAKER) (test code = 9.8 GM/DL    13.7-17.5    L       

     



             410)                                                

 

             HEMATOCRIT (BEAKER) (test code = 29.1 %       40.1-51.0    L       

     



             411)                                                

 

             MEAN CORPUSCULAR VOLUME (BEAKER) 82.0 fL      79.0-92.2            

     



             (test code = 753)                                        

 

             MEAN CORPUSCULAR HEMOGLOBIN 27.6 pg      25.7-32.2                 



             (BEAKER) (test code = 751)                                        

 

             MEAN CORPUSCULAR HEMOGLOBIN CONC 33.7 GM/DL   32.3-36.5            

     



             (BEAKER) (test code = 752)                                        

 

             RED CELL DISTRIBUTION WIDTH 14.6 %       11.6-14.4    H            



             (BEAKER) (test code = 412)                                        

 

             PLATELET COUNT (BEAKER) (test 232 K/CU MM  150-450                 

  



             code = 756)                                         

 

             MEAN PLATELET VOLUME (BEAKER) 10.5 fL      9.4-12.4                

  



             (test code = 754)                                        

 

             NUCLEATED RED BLOOD CELLS 0 /100 WBC   0-0                       



             (BEAKER) (test code = 413)                                        



POCT-GLUCOSE [id number] 21:29:41





             Test Item    Value        Reference Range Interpretation Comments

 

             POC-GLUCOSE METER 315 mg/dL           H            : TESTED A

T [facility] [address]



             (BEAKER) (test code =                                        [facility]

[employer] [facility],



             1538)                                               [address]:



                                                                 /Techni

matilda ID



                                                                 = [id number] for HALEIGH VELÁZQUEZ



POCT-GLUCOSE [id number] 17:17:55





             Test Item    Value        Reference Range Interpretation Comments

 

             POC-GLUCOSE METER 277 mg/dL           H            : TESTED A

T [facility] [address]



             (BEAKER) (test code =                                        [facility]

[employer] [facility],



             1538)                                               [address]:



                                                                 /Techni

matilda ID



                                                                 = [id number] for Blanka Freedman



POCT-GLUCOSE [id number] 08:32:22





             Test Item    Value        Reference Range Interpretation Comments

 

             POC-GLUCOSE METER 123 mg/dL           H            : TESTED A

T [facility] [address]



             (BEAKER) (test code =                                        [place],



             1538)                                               [address]:



                                                                 /Techni

matilda ID



                                                                 = [id number] for Ch

Hugo gibbsylynn



BASIC METABOLIC [id number] 07:07:13





             Test Item    Value        Reference Range Interpretation Comments

 

             SODIUM (BEAKER) 137 meq/L    136-145                   



             (test code = 381)                                        

 

             POTASSIUM    3.9 meq/L    3.5-5.1                   



             (BEAKER) (test                                        



             code = 379)                                         

 

             CHLORIDE (BEAKER) 107 meq/L                        



             (test code = 382)                                        

 

             CO2 (BEAKER) 23 meq/L     22-29                     



             (test code = 355)                                        

 

             BLOOD UREA   34 mg/dL     7-21         H            



             NITROGEN (BEAKER)                                        



             (test code = 354)                                        

 

             CREATININE   2.15 mg/dL   0.57-1.25    H            



             (BEAKER) (test                                        



             code = 358)                                         

 

             GLUCOSE RANDOM 153 mg/dL           H            



             (BEAKER) (test                                        



             code = 652)                                         

 

             CALCIUM (BEAKER) 8.6 mg/dL    8.4-10.2                  



             (test code = 697)                                        

 

             EGFR (BEAKER) 33                                      Interpretatio

n of eGFR



             (test code = mL/min/1.73                            values Stage De

scription



             1092)        sq m                                   Result G1 Leah

l or high



                                                                 >=90 G2 Mildly 

decreased



                                                                 60-89 G3a Mildl

y to



                                                                 moderately 45-5

9 G3b



                                                                 Moderately to s

everely



                                                                 30-44 G4 Severl

y decreased



                                                                 15-29 G5 Kidney

 failure



                                                                 <15Reported eGF

R is based



                                                                 on the CKD-EPI 





                                                                 equation that d

oes not use



                                                                 a race



                                                                 coefficientEsti

mated GFR



                                                                 is not as accur

ate as



                                                                 Creatinine Tamiko reina in



                                                                 predicting glom

erular



                                                                 filtration rate

. Estimated



                                                                 GFR is not appl

icable for



                                                                 dialysis patien

ts



 ID - JULIA [id number] 06:59:12





             Test Item    Value        Reference Range Interpretation Comments

 

             PARTIAL THROMBOPLASTIN TIME 29.7 seconds 22.5-36.0                 



             (BEAKER) (test code = 760)                                        



CBC (HEMOGRAM ONLY)2022 06:39:28





             Test Item    Value        Reference Range Interpretation Comments

 

             WHITE BLOOD CELL COUNT (BEAKER) 12.3 K/ L    3.5-10.5     H        

    



             (test code = 775)                                        

 

             RED BLOOD CELL COUNT (BEAKER) 3.59 M/ L    4.63-6.08    L          

  



             (test code = 761)                                        

 

             HEMOGLOBIN (BEAKER) (test code = 9.6 GM/DL    13.7-17.5    L       

     



             410)                                                

 

             HEMATOCRIT (BEAKER) (test code = 29.6 %       40.1-51.0    L       

     



             411)                                                

 

             MEAN CORPUSCULAR VOLUME (BEAKER) 82.5 fL      79.0-92.2            

     



             (test code = 753)                                        

 

             MEAN CORPUSCULAR HEMOGLOBIN 26.7 pg      25.7-32.2                 



             (BEAKER) (test code = 751)                                        

 

             MEAN CORPUSCULAR HEMOGLOBIN CONC 32.4 GM/DL   32.3-36.5            

     



             (BEAKER) (test code = 752)                                        

 

             RED CELL DISTRIBUTION WIDTH 14.5 %       11.6-14.4    H            



             (BEAKER) (test code = 412)                                        

 

             PLATELET COUNT (BEAKER) (test 213 K/CU MM  150-450                 

  



             code = 756)                                         

 

             MEAN PLATELET VOLUME (BEAKER) 10.2 fL      9.4-12.4                

  



             (test code = 754)                                        

 

             NUCLEATED RED BLOOD CELLS 0 /100 WBC   0-0                       



             (BEAKER) (test code = 413)                                        



POCT-GLUCOSE METER2022-08-10 21:49:30





             Test Item    Value        Reference Range Interpretation Comments

 

             POC-GLUCOSE METER 269 mg/dL           H            : TESTED A

T [facility] [address]



             (BEAKER) (test code =                                        [facility],



             153)                                               [address]:



                                                                 /Techni

matilda ID



                                                                 = [id number] for UL

LATTIL,



                                                                 HALEIGH



POCT-GLUCOSE METER2022-08-10 17:30:11





             Test Item    Value        Reference Range Interpretation Comments

 

             POC-GLUCOSE METER 288 mg/dL           H            : TESTED A

T [facility] [address]



             (BEAKER) (test code =                                        [facility],



             153)                                               [address]:



                                                                 /Techni

matilda ID



                                                                 = [id number] for Hugo Freedmanylynn



RAD, CHEST, 1 VIEW, NON DEPT2022-08-10 16:43:00Reason for exam:-&gt;coughShould 
this be performed at the bedside?-&gt;Yes
************************************************************[facility] : 1955 Sex: 
M************************************************************FINAL REPORT 
PATIENT ID: [id number] Exam: RAD, CHEST, 1 VIEW, NON DEPTDate: 8/10/2022 4:42 PM 
Indication: Cough Comparison: CXR of 2022 FINDINGS: Lines/Tubes:EKG leads 
overlie the chest. Lungs:The lungs are well inflated. No focal consolidation or 
pulmonary edema. Pleura:No pleural effusion. No pneumothorax. 
Heart/Mediastinum:The cardiomediastinal silhouette is normal in size and 
contour. Stable postoperative findings. Bones/Soft Tissues: No acute osseous 
injury. Unchanged sternotomy wires. Abdomen: No free air below the diaphragm. 
IMPRESSION:No focal pneumonia or pulmonary edema. Signed: Luiz CampMDReport 
Verified Date/Time: 08/10/2022 16:43:43 Electronically signed by: LUIZ CAMP MD
on 08/10/2022 04:43 PMPOCT-GLUCOSE METER2022-08-10 12:27:27





             Test Item    Value        Reference Range Interpretation Comments

 

             POC-GLUCOSE METER 292 mg/dL           H            : TESTED A

[facility]



             ([employer]) (test code =                                        [facility],



             1538)                                               [address]:



                                                                 /Techni

matilda ID



                                                                 = [id number] for Blanka Freedman



POCT-GLUCOSE [id number] 08:36:14





             Test Item    Value        Reference Range Interpretation Comments

 

             POC-GLUCOSE METER 133 mg/dL           H            : TESTED A

T [facility] [address]



             ([employer]) (test code =                                        [facility],



             1538)                                               [address]:



                                                                 /Techni

matilda ID



                                                                 = [id number] for Ch

Blanka gibbs



BASIC METABOLIC [id number] 05:09:19





             Test Item    Value        Reference Range Interpretation Comments

 

             SODIUM (BEAKER) 138 meq/L    136-145                   



             (test code = 381)                                        

 

             POTASSIUM    4.1 meq/L    3.5-5.1                   



             (BEAKER) (test                                        



             code = 379)                                         

 

             CHLORIDE (BEAKER) 110 meq/L           H            



             (test code = 382)                                        

 

             CO2 (BEAKER) 21 meq/L     22-29        L            



             (test code = 355)                                        

 

             BLOOD UREA   36 mg/dL     7-21         H            



             NITROGEN (BEAKER)                                        



             (test code = 354)                                        

 

             CREATININE   2.27 mg/dL   0.57-1.25    H            



             (BEAKER) (test                                        



             code = 358)                                         

 

             GLUCOSE RANDOM 174 mg/dL           H            



             (BEAKER) (test                                        



             code = 652)                                         

 

             CALCIUM (BEAKER) 8.4 mg/dL    8.4-10.2                  



             (test code = 697)                                        

 

             EGFR (BEAKER) 31                                      Interpretatio

n of eGFR



             (test code = mL/min/1.73                            values Stage De

scription



             1092)        sq m                                   Result G1 Leah

l or high



                                                                 >=90 G2 Mildly 

decreased



                                                                 60-89 G3a Mildl

y to



                                                                 moderately 45-5

9 G3b



                                                                 Moderately to s

everely



                                                                 30-44 G4 Severl

y decreased



                                                                 15-29 G5 Kidney

 failure



                                                                 <15Reported eGF

R is based



                                                                 on the CKD-EPI 





                                                                 equation that d

oes not use



                                                                 a race



                                                                 coefficientEsti

mated GFR



                                                                 is not as accur

ate as



                                                                 Creatinine Tamiko

latosha in



                                                                 predicting glom

erular



                                                                 filtration rate

. Estimated



                                                                 GFR is not appl

icable for



                                                                 dialysis patien

ts



 ID - PATIENCE [facility] (HEMOGRAM ONLY)2022-08-10 04:48:05





             Test Item    Value        Reference Range Interpretation Comments

 

             WHITE BLOOD CELL COUNT (BEAKER) 11.2 K/ L    3.5-10.5     H        

    



             (test code = 775)                                        

 

             RED BLOOD CELL COUNT (BEAKER) 3.43 M/ L    4.63-6.08    L          

  



             (test code = 761)                                        

 

             HEMOGLOBIN (BEAKER) (test code = 9.1 GM/DL    13.7-17.5    L       

     



             410)                                                

 

             HEMATOCRIT (BEAKER) (test code = 28.4 %       40.1-51.0    L       

     



             411)                                                

 

             MEAN CORPUSCULAR VOLUME (BEAKER) 82.8 fL      79.0-92.2            

     



             (test code = 753)                                        

 

             MEAN CORPUSCULAR HEMOGLOBIN 26.5 pg      25.7-32.2                 



             (BEAKER) (test code = 751)                                        

 

             MEAN CORPUSCULAR HEMOGLOBIN CONC 32.0 GM/DL   32.3-36.5    L       

     



             (AKER) (test code = 752)                                        

 

             RED CELL DISTRIBUTION WIDTH 14.6 %       11.6-14.4    H            



             (AKER) (test code = 412)                                        

 

             PLATELET COUNT ([facility]) (test 204 K/CU MM  150-450                 

  



             code = 756)                                         

 

             MEAN PLATELET VOLUME (AKER) 10.6 fL      9.4-12.4                

  



             (test code = 754)                                        

 

             NUCLEATED RED BLOOD CELLS 0 /100 WBC   0-0                       



             (AKER) (test code = 413)                                        



POCT-GLUCOSE [id number] 21:34:49





             Test Item    Value        Reference Range Interpretation Comments

 

             POC-GLUCOSE METER 311 mg/dL           H            : TESTED A

T [place] [address]



             ([facility]) (test code =                                        [facility],



             1538)                                               [id number]:



                                                                 /Techni

matilda ID



                                                                 = [id number] for SA

CONNER ARRIETA



POCT-GLUCOSE [id number] 17:05:26





             Test Item    Value        Reference Range Interpretation Comments

 

             POC-GLUCOSE METER 327 mg/dL           H            : Notified

 RN/MD:



             ([facility]) (test code =                                        TESTED

 AT [place] [address])                                               [facility],



                                                                 [id number]:



                                                                 /Techni

matilda ID



                                                                 = [id number] for Sh

Millie calixto



POCT-GLUCOSE [id number] 12:53:23





             Test Item    Value        Reference Range Interpretation Comments

 

             POC-GLUCOSE METER 177 mg/dL           H            : Notified

 RN/MD:



             ([facility]) (test code =                                        TESTED

 AT [place] [address]



             153)                                               [facility],



                                                                 [address]:



                                                                 /Techni

matilda ID



                                                                 = [id number] for SA

AMY CARTER



SARS-COV2/RT-PCR ([facility])2022 10:00:17





             Test Item    Value        Reference Range Interpretation Comments

 

             SARS-COV2/RT-PCR (test Negative     Not Detected, Negative,        

      



             code = 5187208)              See external report for              



                                       linked test               

 

             SARS-COV-2 PERFORMING LAB [facility] MARY                             



             (test code = 9045874)                                        



Negative result for this test determines that SARS-CoV-2 RNA was not present in 
the specimen above the Limit of Detection (LOD). However, Negative results do 
not preclude SARS-CoV-2 infection and should not be used as the sole basis for 
treatment or patient management decisions. Negative results must be combined 
with clinical observations, patient history, and epidemiological information. A 
false negative result may occur if a specimen is improperly collected, 
transported or handled. A false negative result should be considered if 
patient's recent exposures or clinical presentation indicate that COVID-19 
(SARS-CoV-2) is likely and diagnostic tests for other causes of illness are 
negative. Re-testing should be considered in cases of suspected false 
negatives.The limit of detection for this assay is 800 copies/mL.This SARS CoV-2
test is a real-time RT-PCR test intended for the qualitative detection of 
nucleic acid from SARS-CoV-2 in a nasopharyngeal swab specimen collected from 
individuals suspected of COVID-19 by their healthcare provider.This test has not
been Food and Drug Administration (FDA) cleared or approved. This is a modified 
version of an approved Emergency Use Authorization (EUA) and is in the process 
of review by the FDA. Once authorized by the FDA, the issued EUA will be 
effective until the declaration that circumstances exist justifying the 
authorization of the emergency use ofin vitro diagnostic tests for detection 
and/or diagnosis of COVID-19 is terminated under Section 564(b)(2) of the Act or
the EUA is revoked under Section 564(g) of the Act.Fact Sheet for Healthcare 
Prov
iders:https://www.Wally/sites/default/files/product/documents/Fact_Sheet_HC

_Qouqandqd_Qogg_TWSO-VjL-2.pdfFact Sheet for Healthcare 
Patients:https://www.Wally/sites/default/files/product/docume
nts/Rwyz_Gueml_Xqyfaarq_Kyeq_BLOO-GrJ-9.pdfPerforming Laboratory:[facility].[place], [place] [address]POCT-GLUCOSE METER
2022 07:55:43





             Test Item    Value        Reference Range Interpretation Comments

 

             POC-GLUCOSE METER 152 mg/dL           H            : TESTED A

T [facility] [address]



             (CHARISSE) (test code =                                        [place],



             1538)                                               [id number]:



                                                                 /Techni

matilda ID



                                                                 = [id number] for AMY CONTRERAS



[id number] 07:01:21





             Test Item    Value        Reference Range Interpretation Comments

 

             PARTIAL THROMBOPLASTIN TIME 29.9 seconds 22.5-36.0                 



             (BEAKER) (test code = 760)                                        



PROTHROMBIN [id number] 07:01:02





             Test Item    Value        Reference Range Interpretation Comments

 

             PROTIME (BEAKER) 13.9 seconds 11.9-14.2                 



             (test code = 759)                                        

 

             INR (BEAKER) (test 1.14         See_Comment                [Automat

ed message]



             code = 370)                                         The system [employer]



                                                                 generated this 

result



                                                                 transmitted ref

erence



                                                                 range: <=5.90. 

The



                                                                 reference range

 was



                                                                 not used to int

erpret



                                                                 this result as



                                                                 normal/abnormal

.



RECOMMENDED COUMADIN/WARFARIN INR THERAPY RANGESSTANDARD DOSE: 2.0 - 3.0 
Includes: PROPHYLAXIS for venous thrombosis, systemic embolization; TREATMENT 
for venous thrombosis and/or pulmonary embolus.HIGH RISK: Target INR is 2.5-3.5 
for patients with mechanical heart valves.BASIC METABOLIC [id number] 
05:24:04





             Test Item    Value        Reference Range Interpretation Comments

 

             SODIUM (BEAKER) 138 meq/L    136-145                   



             (test code = 381)                                        

 

             POTASSIUM    4.2 meq/L    3.5-5.1                   



             (BEAKER) (test                                        



             code = 379)                                         

 

             CHLORIDE (BEAKER) 110 meq/L           H            



             (test code = 382)                                        

 

             CO2 (BEAKER) 19 meq/L     22-29        L            



             (test code = 355)                                        

 

             BLOOD UREA   40 mg/dL     7-21         H            



             NITROGEN (BEAKER)                                        



             (test code = 354)                                        

 

             CREATININE   2.74 mg/dL   0.57-1.25    H            



             (BEAKER) (test                                        



             code = 358)                                         

 

             GLUCOSE RANDOM 176 mg/dL           H            



             (BEAKER) (test                                        



             code = 652)                                         

 

             CALCIUM (BEAKER) 8.8 mg/dL    8.4-10.2                  



             (test code = 697)                                        

 

             EGFR (BEAKER) 25                                      Interpretatio

n of eGFR



             (test code = mL/min/1.73                            values Stage De

scription



             1092)        sq m                                   Result G1 Leah

l or high



                                                                 >=90 G2 Mildly 

decreased



                                                                 60-89 G3a Mildl

y to



                                                                 moderately 45-5

9 G3b



                                                                 Moderately to s

everely



                                                                 30-44 G4 Severl

y decreased



                                                                 15-29 G5 Kidney

 failure



                                                                 <15Reported eGF

R is based



                                                                 on the CKD-EPI 





                                                                 equation that d

oes not use



                                                                 a race



                                                                 coefficientEsti

mated GFR



                                                                 is not as accur

ate as



                                                                 Creatinine Tamiko

latosha in



                                                                 predicting glom

erular



                                                                 filtration rate

. Estimated



                                                                 GFR is not appl

icable for



                                                                 dialysis patien

landon



 ID - PIAYA LCBC (HEMOGRAM ONLY)2022 04:29:49





             Test Item    Value        Reference Range Interpretation Comments

 

             WHITE BLOOD CELL COUNT (BEAKER) 13.6 K/ L    3.5-10.5     H        

    



             (test code = 775)                                        

 

             RED BLOOD CELL COUNT (BEAKER) 3.42 M/ L    4.63-6.08    L          

  



             (test code = 761)                                        

 

             HEMOGLOBIN (BEAKER) (test code = 9.2 GM/DL    13.7-17.5    L       

     



             410)                                                

 

             HEMATOCRIT (BEAKER) (test code = 27.9 %       40.1-51.0    L       

     



             411)                                                

 

             MEAN CORPUSCULAR VOLUME (BEAKER) 81.6 fL      79.0-92.2            

     



             (test code = 753)                                        

 

             MEAN CORPUSCULAR HEMOGLOBIN 26.9 pg      25.7-32.2                 



             (BEAKER) (test code = 751)                                        

 

             MEAN CORPUSCULAR HEMOGLOBIN CONC 33.0 GM/DL   32.3-36.5            

     



             (BEAKER) (test code = 752)                                        

 

             RED CELL DISTRIBUTION WIDTH 14.6 %       11.6-14.4    H            



             (BEAKER) (test code = 412)                                        

 

             PLATELET COUNT (BEAKER) (test 200 K/CU MM  150-450                 

  



             code = 756)                                         

 

             MEAN PLATELET VOLUME (BEAKER) 10.2 fL      9.4-12.4                

  



             (test code = 754)                                        

 

             NUCLEATED RED BLOOD CELLS 0 /100 WBC   0-0                       



             (BEAKER) (test code = 413)                                        



POCT-GLUCOSE [id number] 21:33:46





             Test Item    Value        Reference Range Interpretation Comments

 

             POC-GLUCOSE METER 272 mg/dL           H            : TESTED A

T [facility] [address]



             (BEAKER) (test code =                                        [place],



             [address])                                               [address]:



                                                                 /Techni

matilda ID



                                                                 = [id number] for CONNER LY



POCT-GLUCOSE [id number] 18:07:26





             Test Item    Value        Reference Range Interpretation Comments

 

             POC-GLUCOSE METER 204 mg/dL           H            : TESTED A

T [facility] [address]



             (BEAKER) (test code =                                        [place] [facility],



             1538)                                               [address]:



                                                                 /Techni

matilda ID



                                                                 = [id number] for SA

AMY CARTER



POCT-GLUCOSE [id number] 12:57:22





             Test Item    Value        Reference Range Interpretation Comments

 

             POC-GLUCOSE METER 201 mg/dL           H            : TESTED A

T [facility] [address]



             ([facility]) (test code =                                        [place] [facility],



             [address]38)                                               [address]:



                                                                 /Techni

matilda ID



                                                                 = [id number] for SA CARTER,



                                                                 AMY



PROTEIN ELECTROPHORESIS, SERUM WITH REFLEX TO [id number] 11:13:21





             Test Item    Value        Reference Range Interpretation Comments

 

             ALBUMIN FRACTION 3.5 gm/dL    3.5-5.5                   



             (AKER) (test code                                        



             = 405)                                              

 

             ALPHA 1 FRACTION 0.4 gm/dL    0.2-0.4                   



             (BEAKER) (test code                                        



             = 389)                                              

 

             ALPHA 2 FRACTION 0.8 gm/dL    0.4-1.0                   



             (BEAKER) (test code                                        



             = 390)                                              

 

             BETA FRACTION 0.7 gm/dL    0.5-1.1                   



             (BEAKER) (test code                                        



             = 392)                                              

 

             GAMMA GLOBULIN 0.8 gm/dL    0.7-1.6                   



             FRACTION (BEAKER)                                        



             (test code = 391)                                        

 

             INTERPRETATION-119 Normal electrophoretic                          

 



             ([facility]) (test code pattern. No abnormal peak                      

     



             = 2615)      detected.                              

 

             [id number] Vivien Muhammad M.D                           



             9 ([facility]) (test (electronic signature)                           



             code = 2616)                                        

 

             PROTEIN TOTAL 6.1 gm/dL    6.0-8.3                   



             SERUM, SPEP                                         



             ([facility]) (test code                                        



             = 2660)                                             



 ID - PATIENCE M- ID - ADMOperator ID - ADMHEMOGLOBIN [id number] 
10:39:25





             Test Item    Value        Reference Range Interpretation Comments

 

             HEMOGLOBIN A1C 5.9 %        See_Comment  H             [Automated m

essage]



             ELECTROPHORESIS ([facility])                                        The

 system which



             (test code = 3811)                                        generated

 this result



                                                                 transmitted ref

erence



                                                                 range: <=5.6%. 

The



                                                                 reference range

 was



                                                                 not used to int

erpret



                                                                 this result as



                                                                 normal/abnormal

.



"The A1c is measured using a [facility]-certified method. HbA1c value equal to or 
greater than 6.5% as thediagnosis cutoff for diabetes. An HbA1c value of 5.7-
6.4% indicates increased risk for diabetes (prediabetes)." ID - ADMBASIC
METABOLIC [id number] 08:17:58





             Test Item    Value        Reference Range Interpretation Comments

 

             SODIUM (BEAKER) 139 meq/L    136-145                   



             (test code = 381)                                        

 

             POTASSIUM    3.8 meq/L    3.5-5.1                   



             (BEAKER) (test                                        



             code = 379)                                         

 

             CHLORIDE (BEAKER) 109 meq/L           H            



             (test code = 382)                                        

 

             CO2 (BEAKER) 22 meq/L     22-29                     



             (test code = 355)                                        

 

             BLOOD UREA   38 mg/dL     7-21         H            



             NITROGEN (BEAKER)                                        



             (test code = 354)                                        

 

             CREATININE   2.98 mg/dL   0.57-1.25    H            



             (BEAKER) (test                                        



             code = 358)                                         

 

             GLUCOSE RANDOM 154 mg/dL           H            



             (BEAKER) (test                                        



             code = 652)                                         

 

             CALCIUM (BEAKER) 8.8 mg/dL    8.4-10.2                  



             (test code = 697)                                        

 

             EGFR (BEAKER) 22                                      Interpretatio

n of eGFR



             (test code = mL/min/1.73                            values Stage De

scription



             1092)        sq m                                   Result G1 Leah

l or high



                                                                 >=90 G2 Mildly 

decreased



                                                                 60-89 G3a Mildl

y to



                                                                 moderately 45-5

9 G3b



                                                                 Moderately to s

everely



                                                                 30-44 G4  Sever

ly



                                                                 decreased 15-29

 G5 Kidney



                                                                 failure <15Repo

rted eGFR



                                                                 is based on the

 CKD-EPI



                                                                  equation t

hat does



                                                                 not use a race



                                                                 coefficientEsti

mated GFR



                                                                 is not as accur

ate as



                                                                 Creatinine Tamiko reina in



                                                                 predicting glom

erular



                                                                 filtration rate

. Estimated



                                                                 GFR is not appl

icable for



                                                                 dialysis patien

ts



 ID - MOPOCT-GLUCOSE [id number] 08:06:49





             Test Item    Value        Reference Range Interpretation Comments

 

             POC-GLUCOSE METER 150 mg/dL           H            : TESTED A

T [facility] [address]



             (BEAKER) (test code =                                        [place],



             [address]38)                                               [address]:



                                                                 /Techni

matilda ID



                                                                 = [id number] for AMY CONTRERAS



[id number] 08:04:57





             Test Item    Value        Reference Range Interpretation Comments

 

             MAGNESIUM (BEAKER) (test code = 1.7 mg/dL    1.6-2.6               

    



             627)                                                



 ID - MOCBC W/PLT COUNT &amp; AUTO [id number] 07:46:01





             Test Item    Value        Reference Range Interpretation Comments

 

             WHITE BLOOD CELL COUNT (BEAKER) 14.1 K/ L    3.5-10.5     H        

    



             (test code = 775)                                        

 

             RED BLOOD CELL COUNT (BEAKER) 3.23 M/ L    4.63-6.08    L          

  



             (test code = 761)                                        

 

             HEMOGLOBIN (BEAKER) (test code = 8.8 GM/DL    13.7-17.5    L       

     



             410)                                                

 

             HEMATOCRIT (BEAKER) (test code = 27.1 %       40.1-51.0    L       

     



             411)                                                

 

             MEAN CORPUSCULAR VOLUME (BEAKER) 83.9 fL      79.0-92.2            

     



             (test code = 753)                                        

 

             MEAN CORPUSCULAR HEMOGLOBIN 27.2 pg      25.7-32.2                 



             (BEAKER) (test code = 751)                                        

 

             MEAN CORPUSCULAR HEMOGLOBIN CONC 32.5 GM/DL   32.3-36.5            

     



             (BEAKER) (test code = 752)                                        

 

             RED CELL DISTRIBUTION WIDTH 14.5 %       11.6-14.4    H            



             (BEAKER) (test code = 412)                                        

 

             PLATELET COUNT (BEAKER) (test 193 K/CU MM  150-450                 

  



             code = 756)                                         

 

             MEAN PLATELET VOLUME (BEAKER) 10.2 fL      9.4-12.4                

  



             (test code = 754)                                        

 

             NUCLEATED RED BLOOD CELLS 0 /100 WBC   0-0                       



             (BEAKER) (test code = 413)                                        

 

             NEUTROPHILS RELATIVE PERCENT 81 %                                  

 



             (BEAKER) (test code = 429)                                        

 

             LYMPHOCYTES RELATIVE PERCENT 8 %                                   

 



             (BEAKER) (test code = 430)                                        

 

             MONOCYTES RELATIVE PERCENT 10 %                                   



             (BEAKER) (test code = 431)                                        

 

             EOSINOPHILS RELATIVE PERCENT 0 %                                   

 



             (BEAKER) (test code = 432)                                        

 

             BASOPHILS RELATIVE PERCENT 0 %                                    



             (BEAKER) (test code = 437)                                        

 

             NEUTROPHILS ABSOLUTE COUNT 11.44 K/ L   1.78-5.38    H            



             (BEAKER) (test code = 670)                                        

 

             LYMPHOCYTES ABSOLUTE COUNT 1.14 K/ L    1.32-3.57    L            



             (BEAKER) (test code = 414)                                        

 

             MONOCYTES ABSOLUTE COUNT (BEAKER) 1.37 K/ L    0.30-0.82    H      

      



             (test code = 415)                                        

 

             EOSINOPHILS ABSOLUTE COUNT 0.06 K/ L    0.04-0.54                 



             (BEAKER) (test code = 416)                                        

 

             BASOPHILS ABSOLUTE COUNT (BEAKER) 0.03 K/ L    0.01-0.08           

      



             (test code = 417)                                        

 

             IMMATURE GRANULOCYTES-RELATIVE 1 %          0-1                    

   



             PERCENT (BEAKER) (test code =                                      

  



             2801)                                               



POCT-GLUCOSE [id number] 20:54:59





             Test Item    Value        Reference Range Interpretation Comments

 

             POC-GLUCOSE METER 334 mg/dL           H            : TESTED A

T [facility] [address]



             (BEAKER) (test code =                                        [facility],



             [facility]8)                                               [address]:



                                                                 /Techni

matilda ID



                                                                 = [id number] for Millie Rdz



POCT-GLUCOSE [id number] 17:14:24





             Test Item    Value        Reference Range Interpretation Comments

 

             POC-GLUCOSE METER 294 mg/dL           H            : TESTED A

T [facility] [address]



             (BEAKER) (test code =                                        [facility],



             1538)                                               [address]:



                                                                 /Techni

matilda ID



                                                                 = [id number] for Blanka Freedman



POCT-GLUCOSE [id number] 12:35:15





             Test Item    Value        Reference Range Interpretation Comments

 

             POC-GLUCOSE METER 284 mg/dL           H            : TESTED A

T [facility] [address]



             (BEAKER) (test code =                                        [facility],



             1538)                                               [address]:



                                                                 /Techni

matilda ID



                                                                 = [id number] for Blanka Freedman



POCT-GLUCOSE [id number] 08:14:41





             Test Item    Value        Reference Range Interpretation Comments

 

             POC-GLUCOSE METER 182 mg/dL           H            : TESTED A

T [facility] [address]



             (BEAKER) (test code =                                        [facility],



             [facility]8)                                               [address]:



                                                                 /Techni

matilda ID



                                                                 = [id number] for Blanka Freedman



CALCIUM, [id number] 08:08:36





             Test Item    Value        Reference Range Interpretation Comments

 

             CALCIUM IONIZED (BEAKER) (test 1.18 mmol/L  1.12-1.27              

   



             code = 698)                                         

 

             PH, BLOOD (BEAKER) (test code = 7.35                               

    



             1810)                                               



BASIC METABOLIC [id number] 05:44:25





             Test Item    Value        Reference Range Interpretation Comments

 

             SODIUM (BEAKER) 136 meq/L    136-145                   



             (test code = 381)                                        

 

             POTASSIUM    4.0 meq/L    3.5-5.1                   



             (BEAKER) (test                                        



             code = 379)                                         

 

             CHLORIDE (BEAKER) 107 meq/L                        



             (test code = 382)                                        

 

             CO2 (BEAKER) 20 meq/L     22-29        L            



             (test code = 355)                                        

 

             BLOOD UREA   34 mg/dL     7-21         H            



             NITROGEN (BEAKER)                                        



             (test code = 354)                                        

 

             CREATININE   3.37 mg/dL   0.57-1.25    H            



             (BEAKER) (test                                        



             code = 358)                                         

 

             GLUCOSE RANDOM 190 mg/dL           H            



             (BEAKER) (test                                        



             code = 652)                                         

 

             CALCIUM (BEAKER) 9.2 mg/dL    8.4-10.2                  



             (test code = 697)                                        

 

             EGFR (BEAKER) 19                                       Interpretati

on of eGFR



             (test code = mL/min/1.73                            values Stage De

scription



             1092)        sq m                                   Result G1 Leah

l or high



                                                                 >=90 G2 Mildly 

decreased



                                                                 60-89 G3a Mildl

y to



                                                                 moderately 45-5

9 G3b



                                                                 Moderately to s

everely



                                                                 30-44 G4 Severl

y decreased



                                                                 15-29 G5 Kidney

 failure



                                                                 <15Reported eGF

R is based



                                                                 on the CKD-EPI 





                                                                 equation that d

oes not use



                                                                 a race



                                                                 coefficientEsti

mated GFR



                                                                 is not as accur

ate as



                                                                 Creatinine Tamiko

latosha in



                                                                 predicting glom

erular



                                                                 filtration rate

. Estimated



                                                                 GFR is not appl

icable for



                                                                 dialysis patien

ts



 ID - PATIENCE [id number] 05:37:49





             Test Item    Value        Reference Range Interpretation Comments

 

             MAGNESIUM (BEAKER) (test code = 1.6 mg/dL    1.6-2.6               

    



             627)                                                



 ID - PATIENCE [facility]BC W/PLT COUNT &amp; AUTO [id number] 05:37:28





             Test Item    Value        Reference Range Interpretation Comments

 

             WHITE BLOOD CELL COUNT (BEAKER) 9.4 K/ L     3.5-10.5              

    



             (test code = 775)                                        

 

             RED BLOOD CELL COUNT (BEAKER) 3.38 M/ L    4.63-6.08    L          

  



             (test code = 761)                                        

 

             HEMOGLOBIN (BEAKER) (test code = 9.0 GM/DL    13.7-17.5    L       

     



             410)                                                

 

             HEMATOCRIT (BEAKER) (test code = 28.1 %       40.1-51.0    L       

     



             411)                                                

 

             MEAN CORPUSCULAR VOLUME (BEAKER) 83.1 fL      79.0-92.2            

     



             (test code = 753)                                        

 

             MEAN CORPUSCULAR HEMOGLOBIN 26.6 pg      25.7-32.2                 



             (BEAKER) (test code = 751)                                        

 

             MEAN CORPUSCULAR HEMOGLOBIN CONC 32.0 GM/DL   32.3-36.5    L       

     



             (BEAKER) (test code = 752)                                        

 

             RED CELL DISTRIBUTION WIDTH 14.3 %       11.6-14.4                 



             (BEAKER) (test code = 412)                                        

 

             PLATELET COUNT (BEAKER) (test 160 K/CU MM  150-450                 

  



             code = 756)                                         

 

             MEAN PLATELET VOLUME (BEAKER) 10.0 fL      9.4-12.4                

  



             (test code = 754)                                        

 

             NUCLEATED RED BLOOD CELLS 0 /100 WBC   0-0                       



             (BEAKER) (test code = 413)                                        

 

             NEUTROPHILS RELATIVE PERCENT 90 %                                  

 



             (BEAKER) (test code = 429)                                        

 

             LYMPHOCYTES RELATIVE PERCENT 6 %                                   

 



             (BEAKER) (test code = 430)                                        

 

             MONOCYTES RELATIVE PERCENT 3 %                                    



             (BEAKER) (test code = 431)                                        

 

             EOSINOPHILS RELATIVE PERCENT 1 %                                   

 



             (BEAKER) (test code = 432)                                        

 

             BASOPHILS RELATIVE PERCENT 0 %                                    



             (BEAKER) (test code = 437)                                        

 

             NEUTROPHILS ABSOLUTE COUNT 8.47 K/ L    1.78-5.38    H            



             (BEAKER) (test code = 670)                                        

 

             LYMPHOCYTES ABSOLUTE COUNT 0.56 K/ L    1.32-3.57    L            



             (BEAKER) (test code = 414)                                        

 

             MONOCYTES ABSOLUTE COUNT (BEAKER) 0.25 K/ L    0.30-0.82    L      

      



             (test code = 415)                                        

 

             EOSINOPHILS ABSOLUTE COUNT 0.05 K/ L    0.04-0.54                 



             (BEAKER) (test code = 416)                                        

 

             BASOPHILS ABSOLUTE COUNT (BEAKER) 0.01 K/ L    0.01-0.08           

      



             (test code = 417)                                        

 

             IMMATURE GRANULOCYTES-RELATIVE 1 %          0-1                    

   



             PERCENT (BEAKER) (test code =                                      

  



             2801)                                               



POCT-GLUCOSE [id number] 21:26:35





             Test Item    Value        Reference Range Interpretation Comments

 

             POC-GLUCOSE METER 147 mg/dL           H            : TESTED A

T [facility] [address]



             (BEAKER) (test code =                                        [facility],



             1538)                                               [address]:



                                                                 /Techni

matilda ID



                                                                 = [id number] for AR

DAHLIATRISTIN



                                                                 CLARKE



POCT-GLUCOSE [id number] 17:14:12





             Test Item    Value        Reference Range Interpretation Comments

 

             POC-GLUCOSE METER 137 mg/dL           H            : TESTED A

T [facility] [address]



             (BEAKER) (test code =                                        [facility],



             1538)                                               [address]:



                                                                 /Techni

matilda ID



                                                                 = [id number] for



                                                                 GLADYS CLAUDIO



POCT-GLUCOSE [id number] 12:22:48





             Test Item    Value        Reference Range Interpretation Comments

 

             POC-GLUCOSE METER 150 mg/dL           H            : TESTED A

T [facility] [address]



             (BEAKER) (test code =                                        [facility],



             1538)                                               [address]:



                                                                 /Techni

matilda ID



                                                                 = [id number] for



                                                                 GLADYS CLAUDIO



POCT-GLUCOSE [id number] 08:05:57





             Test Item    Value        Reference Range Interpretation Comments

 

             POC-GLUCOSE METER 135 mg/dL           H            : TESTED A

T [facility] [address]



             (BEAKER) (test code =                                        [facility],



             1538)                                               [address]:



                                                                 /Techni

matilda ID



                                                                 = [id number] for



                                                                 GLADYS CLAUDIO



BASIC METABOLIC [id number] 05:41:11





             Test Item    Value        Reference Range Interpretation Comments

 

             SODIUM (BEAKER) 138 meq/L    136-145                   



             (test code = 381)                                        

 

             POTASSIUM    3.9 meq/L    3.5-5.1                   



             (BEAKER) (test                                        



             code = 379)                                         

 

             CHLORIDE (BEAKER) 109 meq/L           H            



             (test code = 382)                                        

 

             CO2 (BEAKER) 19 meq/L     22-29        L            



             (test code = 355)                                        

 

             BLOOD UREA   36 mg/dL     7-21         H            



             NITROGEN (BEAKER)                                        



             (test code = 354)                                        

 

             CREATININE   3.51 mg/dL   0.57-1.25    H            



             (BEAKER) (test                                        



             code = 358)                                         

 

             GLUCOSE RANDOM 185 mg/dL           H            



             (BEAKER) (test                                        



             code = 652)                                         

 

             CALCIUM (BEAKER) 9.3 mg/dL    8.4-10.2                  



             (test code = 697)                                        

 

             EGFR (BEAKER) 18                                      Interpretatio

n of eGFR



             (test code = mL/min/1.73                            values Stage De

scription



             1092)        sq m                                   Result G1 Leah

l or high



                                                                 >=90 G2 Mildly 

decreased



                                                                 60-89 G3a Mildl

y to



                                                                 moderately 45-5

9 G3b



                                                                 Moderately to s

everely



                                                                 30-44  G4 Sever

ly



                                                                 decreased 15-29

 G5 Kidney



                                                                 failure <15Repo

rted eGFR



                                                                 is based on the

 CKD-EPI



                                                                  equation t

hat does



                                                                 not use a race



                                                                 coefficientEsti

mated GFR



                                                                 is not as accur

ate as



                                                                 Creatinine Tamiko

latosha in



                                                                 predicting glom

erular



                                                                 filtration rate

. Estimated



                                                                 GFR is not appl

icable for



                                                                 dialysis patien

ts



 ID - PATIENCE [facility] (HEMOGRAM ONLY)2022 04:50:11





             Test Item    Value        Reference Range Interpretation Comments

 

             WHITE BLOOD CELL COUNT (BEAKER) 7.8 K/ L     3.5-10.5              

    



             (test code = 775)                                        

 

             RED BLOOD CELL COUNT (BEAKER) 3.30 M/ L    4.63-6.08    L          

  



             (test code = 761)                                        

 

             HEMOGLOBIN (BEAKER) (test code = 8.9 GM/DL    13.7-17.5    L       

     



             410)                                                

 

             HEMATOCRIT (BEAKER) (test code = 27.1 %       40.1-51.0    L       

     



             411)                                                

 

             MEAN CORPUSCULAR VOLUME (BEAKER) 82.1 fL      79.0-92.2            

     



             (test code = 753)                                        

 

             MEAN CORPUSCULAR HEMOGLOBIN 27.0 pg      25.7-32.2                 



             (BEAKER) (test code = 751)                                        

 

             MEAN CORPUSCULAR HEMOGLOBIN CONC 32.8 GM/DL   32.3-36.5            

     



             (BEAKER) (test code = 752)                                        

 

             RED CELL DISTRIBUTION WIDTH 14.5 %       11.6-14.4    H            



             (BEAKER) (test code = 412)                                        

 

             PLATELET COUNT (BEAKER) (test 165 K/CU MM  150-450                 

  



             code = 756)                                         

 

             MEAN PLATELET VOLUME (BEAKER) 10.0 fL      9.4-12.4                

  



             (test code = 754)                                        

 

             NUCLEATED RED BLOOD CELLS 0 /100 WBC   0-0                       



             (BEAKER) (test code = 413)                                        



U/S, RENAL, [id number] 01:52:00Reason for exam:-&gt;B hydronephrosis
************************************************************[facility]АНА, HOMER HÉCTOR : 1955 Sex: 
M************************************************************FINAL REPORT 
PATIENT ID: [id number] Renal ultrasound dated 2022 CLINICAL HISTORY: B 
hydronephrosis COMPARISON: 8/3/2022 Comment: Real-time transabdominal renal 
ultrasound was performed. Right kidney measures 11.5 x 6.5 x 4.9 cm. Left kidney
measures 12.4 x 7.0 x 5.3 cm. Right renal cortex measures 2.2 cm. Left renal 
cortex measures 1.8 cm. Renal parenchymal echogenicity: Normal There is mild to 
moderate bilateral hydronephrosis, right greater than left. There is no 
nephrolithiasis identified on the images provided. No cyst is identified on 
either kidney. Doppler ultrasound demonstrates a patent main renal artery and 
vein bilaterally. A Villafuerte catheter decompresses the urinary bladder. Impression:
Persistent but slightly improved bilateral hydronephrosis when compared to 
recent previous. Findings probably relate to ureteral obstruction from a 
retroperitoneal lesion described on CT abdomen pelvis dated 2022. Please 
refer to that report. Signed: Rene Alicea MDReport Verified Date/Time: 
2022 01:52:26 Electronically signed by: RENE ALICEA M.D. on 2022
01:52 AMPOCT-GLUCOSE [id number] 21:28:14





             Test Item    Value        Reference Range Interpretation Comments

 

             POC-GLUCOSE METER 226 mg/dL           H            : TESTED A

[facility]



             ([employer]) (test code =                                        [facility],



             1538)                                               [address]:



                                                                 /Techni

matilda ID



                                                                 = [id number] for HALEIGH VELÁZQUEZ



POCT-GLUCOSE [id number] 18:11:09





             Test Item    Value        Reference Range Interpretation Comments

 

             POC-GLUCOSE METER 109 mg/dL                        : TESTED A

T [facility] [address]



             ([employer]) (test code =                                        [facility],



             1538)                                               [address]:



                                                                 /Techni

matilda ID



                                                                 = [id number] for Huma Kearney



ANTI-NUCLEAR ANTIBODY (ASHVIN)2022 12:36:55





             Test Item    Value        Reference Range Interpretation Comments

 

             ANTI-NUCLEAR ANTIBODY (ASHVIN) ([facility]) Negative     Negative         

         



             (test code = 418)                                        



Test performed by IFA method.POCT-GLUCOSE [id number] 12:17:08





             Test Item    Value        Reference Range Interpretation Comments

 

             POC-GLUCOSE METER 137 mg/dL           H            : TESTED A

T [facility] [address]



             (BEAKER) (test code =                                        [facility],



             1538)                                               [address]:



                                                                 /Techni

matilda ID



                                                                 = [id number] for Blanka Freedman



[id number] 09:55:04





             Test Item    Value        Reference Range Interpretation Comments

 

             PARTIAL THROMBOPLASTIN TIME 117.3 seconds 22.5-36.0    H           

 



             (BEAKER) (test code = 760)                                        



POCT-GLUCOSE [id number] 08:08:34





             Test Item    Value        Reference Range Interpretation Comments

 

             POC-GLUCOSE METER 126 mg/dL           H            : TESTED A

T [facility] 67[address]



             ([facility]) (test code =                                        [facility],



             1538)                                               [address]:



                                                                 /Techni

matilda ID



                                                                 = [id number] for Blanka Freedman



[id number] 05:29:45





             Test Item    Value        Reference Range Interpretation Comments

 

             FERRITIN (BEAKER) (test code = 112.21 ng/mL 5..00            

   



             361)                                                



 ID - PATIENCE GUERRERO-IRON, TIBC, % SAT. (WITHOUT FERRITIN)2022 05:23:21





             Test Item    Value        Reference Range Interpretation Comments

 

             IRON (BEAKER) (test code = 547) 97.0 ug/dL   40.0-160.0            

    

 

             TOTAL IRON BINDING CAPACITY 263 ug/dL    250-450                   



             (BEAKER) (test code = 769)                                        

 

             IRON % SATURATION (2) (BEAKER) 37 %         20-55                  

   



             (test code = 2590)                                        



 ID - PATIENCE GUERRERO-BASIC METABOLIC [id number] 05:08:42





             Test Item    Value        Reference Range Interpretation Comments

 

             SODIUM (BEAKER) 142 meq/L    136-145                   



             (test code = 381)                                        

 

             POTASSIUM    3.9 meq/L    3.5-5.1                   



             (BEAKER) (test                                        



             code = 379)                                         

 

             CHLORIDE (BEAKER) 111 meq/L           H            



             (test code = 382)                                        

 

             CO2 (BEAKER) 22 meq/L     22-29                     



             (test code = 355)                                        

 

             BLOOD UREA   37 mg/dL     7-21         H            



             NITROGEN (BEAKER)                                        



             (test code = 354)                                        

 

             CREATININE   3.57 mg/dL   0.57-1.25    H            



             (BEAKER) (test                                        



             code = 358)                                         

 

             GLUCOSE RANDOM 127 mg/dL           H            



             (BEAKER) (test                                        



             code = 652)                                         

 

             CALCIUM (BEAKER) 10.0 mg/dL   8.4-10.2                  



             (test code = 697)                                        

 

             EGFR (BEAKER) 18                                      Interpretatio

n of eGFR



             (test code = mL/min/1.73                            values Stage De

scription



             1092)        sq m                                   Result G1 Leah

l or high



                                                                 >=90 G2 Mildly 

decreased



                                                                 60-89 G3a Mildl

y to



                                                                 moderately 45-5

9 G3b



                                                                 Moderately to s

everely



                                                                 30-44 G4 Severl

y decreased



                                                                 15-29 G5 Kidney

 failure



                                                                 <15Reported eGF

R is based



                                                                 on the CKD-EPI 





                                                                 equation that d

oes not use



                                                                 a race



                                                                 coefficientEsti

mated GFR



                                                                 is not as accur

ate as



                                                                 Creatinine Tamiko

latosha in



                                                                 predicting glom

erular



                                                                 filtration rate

. Estimated



                                                                 GFR is not appl

icable for



                                                                 dialysis patien

ts



 ID - PATIENCE [id number] 05:07:14





             Test Item    Value        Reference Range Interpretation Comments

 

             MAGNESIUM (BE[employer]) (test code = 1.8 mg/dL    1.6-2.6               

    



             627)                                                



 ID - PATIENCE -C-REACTIVE [id number] 05:07:14





             Test Item    Value        Reference Range Interpretation Comments

 

             C-REACTIVE PROTEIN (BEAKER) (test 0.54 mg/dL   0.00-0.50    H      

      



             code = 676)                                         



 ID - PATIENCE [id number] 22:39:06





             Test Item    Value        Reference Range Interpretation Comments

 

             PARTIAL THROMBOPLASTIN TIME 34.0 seconds 22.5-36.0                 



             ([employer]) (test code = 760)                                        



POCT-GLUCOSE [id number] 21:35:07





             Test Item    Value        Reference Range Interpretation Comments

 

             POC-GLUCOSE METER 124 mg/dL           H            : TESTED A

T [facility] 6720



             ([employer]) (test code =                                        [facility],



             1538)                                               [address]:



                                                                 /Techni

matilda ID



                                                                 = [id number] for CLARKE CRAIN



POCT-GLUCOSE [id number] 16:56:36





             Test Item    Value        Reference Range Interpretation Comments

 

             POC-GLUCOSE METER 121 mg/dL           H            : TESTED A

T [facility] 6720



             ([employer]) (test code =                                        [facility] [place],



             1538)                                               [address]:



                                                                 /Techni

matilda ID



                                                                 = [id number] for AMY CONTRERAS



[id number] 15:21:45





             Test Item    Value        Reference Range Interpretation Comments

 

             PARTIAL THROMBOPLASTIN TIME 64.9 seconds 22.5-36.0    H            



             (CHARISSE) (test code = 760)                                        



POCT-GLUCOSE [id number] 12:38:30





             Test Item    Value        Reference Range Interpretation Comments

 

             POC-GLUCOSE METER 135 mg/dL           H            : TESTED A

T [facility] 6720



             (CHARISSE) (test code =                                        [facility],



             1538)                                               [address]:



                                                                 /Techni

matilda ID



                                                                 = [id number] for AMY CONTRERAS



CT, [id number] 10:35:00Unlisted Reason for Exam - Click Yes and Enter 
Reason Below-&gt;NoIs this for enterography?-&gt;NoWill this procedure require 
oral contrast?-&gt;No
************************************************************[facility] : 1955 Sex: 
M************************************************************FINAL REPORT 
PATIENT ID: [id number] ABDOMINAL AND PELVIS CT DATED 2022 CLINICAL 
INFORMATION: Hydronephrosis TECHNIQUE: Axial images of the abdomen and pelvis 
were obtained from diaphragm to the pubic symphysis without GI or intravenous 
contrast. This exam was performed according to our departmental dose-
optimization program, which includes automated exposure control, adjustment of 
the mA and/or kV according to patient size and/or use of interactive 
reconstruction technique. COMMENT: Liver and spleen are normal in size without 
focal abnormality. Gallbladder is contracted. No gallstone or biliary dilatation
is noted. Pancreas and adrenals are unremarkable. Both kidneys are normal in 
size. Bilateral hydronephrosis and proximal hydroureter are seen. No 
urolithiasis is seen. The small and large bowel are suboptimally evaluated 
secondary to lack of GI and intravenous contrast. No small large dilatation or 
wall thickening is apparent. Appendix is normal in caliber. Contiguous soft 
tissue mass/adenopathyis seen in the periaortic, aortocaval retroperitoneum 
extend to the bilateral common and right internal iliac iliac lymphatic chain. 
Prostate is normal in size. The urinary bladder is contracted. Vascular 
opacification is seen in the abdominal aorta, superior mesenteric, and bilateral
iliac arteries. IMPRESSION: 1. Contiguous mass/adenopathy in the retroperitoneum
extending to the pelvis suggestive of adenopathy or retroperitoneal fibrosis.2. 
Bilateral hydronephrosis and proximal hydroureter. Signed: Emily Floreseport 
Verified Date/Time: 2022 10:35:02 Electronically signed by: EMILY FLORES M.D. on 2022 10:35 AMPOCT-GLUCOSE [id number] 08:46:22





             Test Item    Value        Reference Range Interpretation Comments

 

             POC-GLUCOSE METER 116 mg/dL           H            : TESTED A

T [facility] 67[address]



             (BEAKER) (test code =                                        [facility],



             1538)                                               [address]:



                                                                 /Techni

matilda ID



                                                                 = [id number] for AMY CONTRERAS



[id number] 08:12:03





             Test Item    Value        Reference Range Interpretation Comments

 

             PROTIME (BEAKER) (test 13.8 seconds 11.9-14.2                 



             code = 759)                                         

 

             INR (BEAKER) (test 1.13         See_Comment                [Automat

ed



             code = 370)                                         message] The sy

stem



                                                                 which generated



                                                                 this result



                                                                 transmitted



                                                                 reference range

:



                                                                 <=5.90. The



                                                                 reference range

 was



                                                                 not used to



                                                                 interpret this



                                                                 result as



                                                                 normal/abnormal

.

 

             PARTIAL THROMBOPLASTIN 88.8 seconds 22.5-36.0    H            



             TIME (BEAKER) (test                                        



             code = 760)                                         



RECOMMENDED COUMADIN/WARFARIN INR THERAPY RANGESSTANDARD DOSE: 2.0 - 3.0 
Includes: PROPHYLAXIS for venous thrombosis, systemic embolization; TREATMENT 
for venous thrombosis and/or pulmonary embolus.HIGH RISK: Target INR is 2.5-3.5 
for patients with mechanical heart valves.[id number] 07:15:37





             Test Item    Value        Reference Range Interpretation Comments

 

             PARTIAL THROMBOPLASTIN TIME > seconds    22.5-36.0    HH           



             (BEAKER) (test code = 760)                                        



BASIC METABOLIC [id number] 03:17:31





             Test Item    Value        Reference Range Interpretation Comments

 

             SODIUM (BEAKER) 139 meq/L    136-145                   



             (test code = 381)                                        

 

             POTASSIUM    3.6 meq/L    3.5-5.1                   



             (BEAKER) (test                                        



             code = 379)                                         

 

             CHLORIDE (BEAKER) 109 meq/L           H            



             (test code = 382)                                        

 

             CO2 (BEAKER) 20 meq/L     22-29        L            



             (test code = 355)                                        

 

             BLOOD UREA   39 mg/dL     7-21         H            



             NITROGEN (BEAKER)                                        



             (test code = 354)                                        

 

             CREATININE   3.67 mg/dL   0.57-1.25    H            



             (BEAKER) (test                                        



             code = 358)                                         

 

             GLUCOSE RANDOM 76 mg/dL                         



             (BEAKER) (test                                        



             code = 652)                                         

 

             CALCIUM (BEAKER) 9.9 mg/dL    8.4-10.2                  



             (test code = 697)                                        

 

             EGFR (BEAKER) 17                                      Interpretatio

n of eGFR



             (test code = mL/min/1.73                            values Stage De

scription



             1092)        sq m                                   Result G1 Leah

l or high



                                                                 >=90 G2 Mildly 

decreased



                                                                 60-89 G3a Mildl

y to



                                                                 moderately 45-5

9 G3b



                                                                 Moderately to s

everely



                                                                 30-44 G4 Severl

y decreased



                                                                 15-29 G5 Kidney

 failure



                                                                 <15Reported eGF

R is based



                                                                 on the CKD-EPI 





                                                                 equation that d

oes not use



                                                                 a race



                                                                 coefficientEsti

mated GFR



                                                                 is not as accur

ate as



                                                                 Creatinine Tamiko

latosha in



                                                                 predicting glom

erular



                                                                 filtration rate

. Estimated



                                                                 GFR is not appl

icable for



                                                                 dialysis patien

ts



 ID - JULIA [id number] 02:48:05





             Test Item    Value        Reference Range Interpretation Comments

 

             MAGNESIUM (BEAKER) (test code = 1.9 mg/dL    1.6-2.6               

    



             627)                                                



 ID - JULIA LHIGH SENSITIVITY TROPONIN -[id number] 02:46:23





             Test Item    Value        Reference Range Interpretation Comments

 

             HIGH SENSITIVITY 337 pg/ml    See_Comment  H             [Automated

 message]



             TROPONIN I (test code                                        The sy

stem which



             = [id number])                                          generated this 

result



                                                                 transmitted ref

erence



                                                                 range: <=35. Th

e



                                                                 reference range

 was



                                                                 not used to int

erpret



                                                                 this result as



                                                                 normal/abnormal

.



 ID Izabella Alatorre  STAT High Sensitivity Troponin-I results 
should be used in conjunction with other diagnostic information such as ECG, 
clinical observations and information, and patient symptoms to aid in the 
diagnosis of MI.POCT-GLUCOSE [id number] 22:04:13





             Test Item    Value        Reference Range Interpretation Comments

 

             POC-GLUCOSE METER 139 mg/dL           H            : TESTED A

T [facility] 67[address]



             [facility] (test code =                                        [facility],



             1538)                                               [address]:



                                                                 /Techni

matilda ID



                                                                 = [id number] for CONNER LY



[id number] 18:59:14





             Test Item    Value        Reference Range Interpretation Comments

 

             PARTIAL THROMBOPLASTIN TIME 42.8 seconds 22.5-36.0    H            



             ([facility]) (test code = 760)                                        



POCT-GLUCOSE [id number] 17:22:21





             Test Item    Value        Reference Range Interpretation Comments

 

             POC-GLUCOSE METER 113 mg/dL           H            : [facility]ED A

[facility]



             [facility] (test code =                                        [facility],



             1538)                                               [address]:



                                                                 /Techni

matilda ID



                                                                 = [id number] for AMY CONTRERAS



U/S, RENAL, [id number] 14:34:00Reason for exam:-&gt;JENNA on CKD
************************************************************[facility] : 1955 Sex: 
M************************************************************FINAL REPORT 
PATIENT ID: [id number] U/S, RENAL, COMPLETE CLINICAL HISTORY: JENNA on CKD 
COMPARISON: None. TECHNIQUE: Real time grayscale and color Doppler imaging of 
the kidneys and pre and post void urinary bladder was performed. FINDINGS: Right
kidney:Length = 10.8 cmCortical thickness: NormalEchogenicity: NormalCollecting 
system: Moderate hydronephrosisOther findings: None Left kidney:Length = 11.2 
cmCortical thickness: NormalEchogenicity: NormalCollecting system: Mild to 
moderate hydronephrosisOther findings: None Urinary bladder: Normally distended 
prevoid with a volume of 491 mL. Post void residual urinary bladder volume 33 
mL. Bilateral hydronephrosis persists post void. IMPRESSION: Moderate right and 
mild to moderate left hydronephrosis. Signed: Verito León Verified 
Date/Time: 2022 14:34:08 Electronically signed by: VERITO LEÓN MD 
on 2022 02:34 PMPOCT-GLUCOSE [id number] 12:15:59





             Test Item    Value        Reference Range Interpretation Comments

 

             POC-GLUCOSE METER 123 mg/dL           H            : TESTED A

T [facility] 6720



             (BEAKER) (test code =                                        [facility],



             1538)                                               [address]:



                                                                 /Techni

matilda ID



                                                                 = [id number] for AMY CONTRERAS



[id number] 10:04:57





             Test Item    Value        Reference Range Interpretation Comments

 

             PARTIAL THROMBOPLASTIN TIME 46.9 seconds 22.5-36.0    H            



             (BEAKER) (test code = 760)                                        



POCT-GLUCOSE [id number] 08:39:00





             Test Item    Value        Reference Range Interpretation Comments

 

             POC-GLUCOSE METER 116 mg/dL           H            : TESTED A

T [facility] 6720



             (BEAKER) (test code =                                        [facility],



             1538)                                               [address]:



                                                                 /Techni

matilda ID



                                                                 = [id number] for AMY CONTRERAS



PROTEIN, RANDOM [id number] 07:06:23





             Test Item    Value        Reference Range Interpretation Comments

 

             PROTEIN, URINE (BEAKER) (test code = < mg/dL      0-14             

         



             1569)                                               



 ID - BSCREATININE, RANDOM [id number] 07:05:15





             Test Item    Value        Reference Range Interpretation Comments

 

             CREATININE URINE (BEAKER) (test 43.5 mg/dL                         

    



             code = 375)                                         



Reference Range: No NormalsOperator ID - BSBASIC METABOLIC [id number] 
04:19:40





             Test Item    Value        Reference Range Interpretation Comments

 

             SODIUM (BEAKER) 140 meq/L    136-145                   



             (test code = 381)                                        

 

             POTASSIUM    3.9 meq/L    3.5-5.1                   



             (BEAKER) (test                                        



             code = 379)                                         

 

             CHLORIDE (BEAKER) 109 meq/L           H            



             (test code = 382)                                        

 

             CO2 (BEAKER) 22 meq/L     22-29                     



             (test code = 355)                                        

 

             BLOOD UREA   44 mg/dL     7-21         H            



             NITROGEN (BEAKER)                                        



             (test code = 354)                                        

 

             CREATININE   4.03 mg/dL   0.57-1.25    H            



             (BEAKER) (test                                        



             code = 358)                                         

 

             GLUCOSE RANDOM 125 mg/dL           H            



             (BEAKER) (test                                        



             code = 652)                                         

 

             CALCIUM (BEAKER) 10.5 mg/dL   8.4-10.2     H            



             (test code = 697)                                        

 

             EGFR (BEAKER) 15                                      Interpretatio

n of eGFR



             (test code = mL/min/1.73                            values Stage De

scription



             1092)        sq m                                   Result G1 Leah

l or high



                                                                 >=90  G2 Mildly

 decreased



                                                                 60-89 G3a Mildl

y to



                                                                 moderately 45-5

9 G3b



                                                                 Moderately to s

everely



                                                                 30-44 G4 Severl

y decreased



                                                                 15-29 G5 Kidney

 failure



                                                                 <15Reported eGF

R is based



                                                                 on the CKD-EPI 





                                                                 equation that d

oes not use



                                                                 a race



                                                                 coefficientEsti

mated GFR



                                                                 is not as accur

ate as



                                                                 Creatinine Tamiko

latosha in



                                                                 predicting glom

erular



                                                                 filtration rate

. Estimated



                                                                 GFR is not appl

icable for



                                                                 dialysis patien

ts



 ID - SUNDAY GLIPID [id number] 04:19:26





             Test Item    Value        Reference Range Interpretation Comments

 

             TRIGLYCERIDES (BEAKER) (test code = 153 mg/dL                      

        



             540)                                                

 

             CHOLESTEROL (BEAKER) (test code = 115 mg/dL                        

      



             631)                                                

 

             HDL CHOLESTEROL (BEAKER) (test code 26 mg/dL                       

        



             = 976)                                              

 

             LDL CHOLESTEROL CALCULATED (BEAKER) 58 mg/dL                       

        



             (test code = 633)                                        



Triglyceride Reference Range: Low Risk  &lt;150 Borderline 150-199 High Risk 
200-499 Very High Risk &gt;=500Cholesterol Reference Range: Low Risk &lt;200 
Borderline 200-239 High Risk &gt;240HDL Cholesterol Reference Range: Low Risk 
&gt;=60 High Risk &lt;40LDL Cholesterol Reference Range: Optimal &lt;100 Near 
Optimal 100-129 Borderline 130-159 High 160-189 Very High &gt;=190  SHALONDA BRAND [id number] 04:19:25





             Test Item    Value        Reference Range Interpretation Comments

 

             MAGNESIUM (BEAKER) (test code = 2.1 mg/dL    1.6-2.6               

    



             627)                                                



 ID - SUNDAY [id number] 04:19:25





             Test Item    Value        Reference Range Interpretation Comments

 

             PHOSPHORUS (BEAKER) (test code = 4.8 mg/dL    2.3-4.7      H       

     



             604)                                                



 ID - SUNDAY [id number] 04:10:58





             Test Item    Value        Reference Range Interpretation Comments

 

             PARTIAL THROMBOPLASTIN TIME 52.7 seconds 22.5-36.0    H            



             (BEAKER) (test code = 760)                                        



HIGH SENSITIVITY TROPONIN -60-71 00:03:34





             Test Item    Value        Reference Range Interpretation Comments

 

             HIGH SENSITIVITY 717 pg/ml    See_Comment  HH            [Automated

 message]



             TROPONIN I (test code                                        The sy

stem which



             = [id number])                                          generated this 

result



                                                                 transmitted ref

erence



                                                                 range: <=35. Th

e



                                                                 reference range

 was



                                                                 not used to int

erpret



                                                                 this result as



                                                                 normal/abnormal

.



 ID - BSThe  STAT High Sensitivity Troponin-I results should be
used in conjunctionwith other diagnostic information such as ECG, clinical 
observations and information, and patient symptoms to aid in the diagnosis of 
MI.[id number] 23:10:05





             Test Item    Value        Reference Range Interpretation Comments

 

             PARTIAL THROMBOPLASTIN TIME 48.3 seconds 22.5-36.0    H            



             (BEAKER) (test code = 760)                                        



POCT-GLUCOSE [id number] 21:09:03





             Test Item    Value        Reference Range Interpretation Comments

 

             POC-GLUCOSE METER 159 mg/dL           H            : TESTED A

T [facility] [address]



             (BEAKER) (test code =                                        [place],



             [address]38)                                               [id number]:



                                                                 /Techni

matilda ID



                                                                 = [id number] for SUZANNA

LATTIL,



                                                                 HALEIGH



CREATININE, RANDOM [id number] 18:38:16





             Test Item    Value        Reference Range Interpretation Comments

 

             CREATININE URINE (BEAKER) (test 87.9 mg/dL                         

    



             code = 375)                                         



Reference Range: No NormalsOperator ID - BSSODIUM, RANDOM [id number] 
18:38:16





             Test Item    Value        Reference Range Interpretation Comments

 

             SODIUM URINE (BEAKER) (test code = 33 meq/L                        

       



             243)                                                



Reference Range: No NormalsOperator ID - BSPOCT-GLUCOSE [id number] 17:19:46





             Test Item    Value        Reference Range Interpretation Comments

 

             POC-GLUCOSE METER 110 mg/dL                        : TESTED A

T [facility] [address]



             ([facility]) (test code =                                        [facility]

JOV[place] [facility],



             [address]38)                                               [id number]:



                                                                 /Techni

matilda ID



                                                                 = [id number] for Blanka Freedman



HIGH SENSITIVITY TROPONIN -42-22 17:17:34





             Test Item    Value        Reference Range Interpretation Comments

 

             HIGH SENSITIVITY 1265 pg/ml   See_Comment  HH            [Automated

 message]



             TROPONIN I (test code                                        The sy

stem which



             = [id number])                                          generated this 

result



                                                                 transmitted ref

erence



                                                                 range: <=35. Th

e



                                                                 reference range

 was



                                                                 not used to int

erpret



                                                                 this result as



                                                                 normal/abnormal

.



 ID - BSThe  STAT High Sensitivity Troponin-I results should be
used in conjunctionwith other diagnostic information such as ECG, clinical 
observations and information, and patient symptoms to aid in the diagnosis of 
MI.[id number] 12:47:42





             Test Item    Value        Reference Range Interpretation Comments

 

             PARTIAL THROMBOPLASTIN TIME 26.1 seconds 22.5-36.0                 



             ([employer]) (test code = 760)                                        



POCT-GLUCOSE [id number] 12:17:30





             Test Item    Value        Reference Range Interpretation Comments

 

             POC-GLUCOSE METER 155 mg/dL           H            : TESTED A

T [facility] [address]



             ([facility]) (test code =                                        [facility]

[place] [facility],



             1538)                                               [id number]:



                                                                 /Techni

matilda ID



                                                                 = [id number] for Blanka Freedman



HIGH SENSITIVITY TROPONIN  08:48:28





             Test Item    Value        Reference Range Interpretation Comments

 

             HIGH SENSITIVITY 778 pg/ml    See_Comment  HH            [Automated

 message]



             TROPONIN I (test code                                        The sy

stem which



             = [id number])                                          generated this 

result



                                                                 transmitted ref

erence



                                                                 range: <=35. Th

e



                                                                 reference range

 was



                                                                 not used to int

erpret



                                                                 this result as



                                                                 normal/abnormal

.



 ID - PIAYA LThe  STAT High Sensitivity Troponin-I results 
should be used in conjunction with other diagnostic information such as ECG, 
clinical observations and information, and patient symptoms to aid in the 
diagnosis of MI.HEMOGLOBIN [id number] 08:45:42





             Test Item    Value        Reference Range Interpretation Comments

 

             HEMOGLOBIN A1C 5.8 %        See_Comment  H             [Automated m

essage]



             ELECTROPHORESIS ([employer])                                        The

 system which



             (test code = 3811)                                        generated

 this result



                                                                 transmitted ref

erence



                                                                 range: <=5.6%. 

The



                                                                 reference range

 was



                                                                 not used to int

erpret



                                                                 this result as



                                                                 normal/abnormal

.



"The A1c is measured using a [facility]-certified method. HbA1c value equal to or 
greater than 6.5% as thediagnosis cutoff for diabetes. An HbA1c value of 5.7-
6.4% indicates increased risk for diabetes (prediabetes)." ID - ADMPOCT-
GLUCOSE [id number] 08:25:32





             Test Item    Value        Reference Range Interpretation Comments

 

             POC-GLUCOSE METER 105 mg/dL                        : TESTED A

T [facility] [address]



             (BEAKER) (test code =                                        [place],



             [address]38)                                               [id number]:



                                                                 /Techni

matilda ID



                                                                 = [id number] for Blanka Freedman



BASIC METABOLIC [id number] 06:24:38





             Test Item    Value        Reference Range Interpretation Comments

 

             SODIUM (BEAKER) 141 meq/L    136-145                   



             (test code = 381)                                        

 

             POTASSIUM    3.8 meq/L    3.5-5.1                   



             (BEAKER) (test                                        



             code = 379)                                         

 

             CHLORIDE (BEAKER) 110 meq/L           H            



             (test code = 382)                                        

 

             CO2 (BEAKER) 21 meq/L     22-29        L            



             (test code = 355)                                        

 

             BLOOD UREA   42 mg/dL     7-21         H            



             NITROGEN (BEAKER)                                        



             (test code = 354)                                        

 

             CREATININE   4.20 mg/dL   0.57-1.25    H            



             (BEAKER) (test                                        



             code = 358)                                         

 

             GLUCOSE RANDOM 96 mg/dL                         



             (BEAKER) (test                                        



             code = 652)                                         

 

             CALCIUM (BEAKER) 10.0 mg/dL   8.4-10.2                  



             (test code = 697)                                        

 

             EGFR (BEAKER) 15                                      Interpretatio

n of eGFR



             (test code = mL/min/1.73                            values Stage De

scription



             1092)        sq m                                   Result G1 Leah

l or high



                                                                 >=90  G2 Mildly

 decreased



                                                                 60-89 G3a Mildl

y to



                                                                 moderately 45-5

9 G3b



                                                                 Moderately to s

everely



                                                                 30-44 G4 Severl

y decreased



                                                                 15-29 G5 Kidney

 failure



                                                                 <15Reported eGF

R is based



                                                                 on the CKD-EPI 





                                                                 equation that d

oes not use



                                                                 a race



                                                                 coefficientEsti

mated GFR



                                                                 is not as accur

ate as



                                                                 Creatinine Tamiko reina in



                                                                 predicting glom

erular



                                                                 filtration rate

. Estimated



                                                                 GFR is not appl

icable for



                                                                 dialysis patien

ts



 ID - JULIA LHEPATIC FUNCTION [id number] 06:18:53





             Test Item    Value        Reference Range Interpretation Comments

 

             TOTAL PROTEIN (BEAKER) (test code = 6.6 gm/dL    6.0-8.3           

        



             770)                                                

 

             ALBUMIN (BEAKER) (test code = 1145) 3.9 g/dL     3.5-5.0           

        

 

             BILIRUBIN TOTAL (BEAKER) (test code 0.3 mg/dL    0.2-1.2           

        



             = 377)                                              

 

             BILIRUBIN DIRECT (BEAKER) (test 0.1 mg/dL    0.1-0.5               

    



             code = 706)                                         

 

             ALKALINE PHOSPHATASE (BEAKER) (test 82 U/L                   

        



             code = 346)                                         

 

             AST (SGOT) (BEAKER) (test code = 14 U/L       5-34                 

     



             353)                                                

 

             ALT (SGPT) (BEAKER) (test code = 9 U/L        6-55                 

     



             347)                                                



 ID - ROCKLEENA [id number] 06:18:52





             Test Item    Value        Reference Range Interpretation Comments

 

             MAGNESIUM (BEAKER) (test code = 1.5 mg/dL    1.6-2.6      L        

    



             627)                                                



 ID - ROCKLEENA [id number] 06:18:52





             Test Item    Value        Reference Range Interpretation Comments

 

             PHOSPHORUS (BEAKER) (test code = 4.7 mg/dL    2.3-4.7              

     



             604)                                                



 ID - ROCKLEENA LHIGH SENSITIVITY TROPONIN -22-03 05:47:38





             Test Item    Value        Reference Range Interpretation Comments

 

             HIGH SENSITIVITY 356 pg/ml    See_Comment  H             [Automated

 message]



             TROPONIN I (test code                                        The sy

stem which



             = [id number])                                          generated this 

result



                                                                 transmitted ref

erence



                                                                 range: <=35. Th

e



                                                                 reference range

 was



                                                                 not used to int

erpret



                                                                 this result as



                                                                 normal/abnormal

.



 ID - JULIA LThe  STAT High Sensitivity Troponin-I results 
should be used in conjunction with other diagnostic information such as ECG, 
clinical observations and information, and patient symptoms to aid in the 
diagnosis of MI.PROTHROMBIN TIME/[id number] 05:39:34





             Test Item    Value        Reference Range Interpretation Comments

 

             PROTIME (BEAKER) 14.2 seconds 11.9-14.2                 



             (test code = 759)                                        

 

             INR (BEAKER) (test 1.17         See_Comment                [Automat

ed message]



             code = 370)                                         The system [employer]



                                                                 generated this 

result



                                                                 transmitted ref

erence



                                                                 range: <=5.90. 

The



                                                                 reference range

 was



                                                                 not used to int

erpret



                                                                 this result as



                                                                 normal/abnormal

.



RECOMMENDED COUMADIN/WARFARIN INR THERAPY RANGESSTANDARD DOSE: 2.0 - 3.0 
Includes: PROPHYLAXIS for venous thrombosis, systemic embolization; TREATMENT 
for venous thrombosis and/or pulmonary embolus.HIGH RISK: Target INR is 2.5-3.5 
for patients with mechanical heart valves.CBC W/PLT COUNT &amp; AUTO 
[id number] 05:21:01





             Test Item    Value        Reference Range Interpretation Comments

 

             WHITE BLOOD CELL COUNT (BEAKER) 8.6 K/ L     3.5-10.5              

    



             (test code = 775)                                        

 

             RED BLOOD CELL COUNT (BEAKER) 3.82 M/ L    4.63-6.08    L          

  



             (test code = 761)                                        

 

             HEMOGLOBIN (BEAKER) (test code = 10.4 GM/DL   13.7-17.5    L       

     



             410)                                                

 

             HEMATOCRIT (BEAKER) (test code = 31.9 %       40.1-51.0    L       

     



             411)                                                

 

             MEAN CORPUSCULAR VOLUME (BEAKER) 83.5 fL      79.0-92.2            

     



             (test code = 753)                                        

 

             MEAN CORPUSCULAR HEMOGLOBIN 27.2 pg      25.7-32.2                 



             (BEAKER) (test code = 751)                                        

 

             MEAN CORPUSCULAR HEMOGLOBIN CONC 32.6 GM/DL   32.3-36.5            

     



             (BEAKER) (test code = 752)                                        

 

             RED CELL DISTRIBUTION WIDTH 14.1 %       11.6-14.4                 



             (BEAKER) (test code = 412)                                        

 

             PLATELET COUNT (BEAKER) (test 187 K/CU MM  150-450                 

  



             code = 756)                                         

 

             MEAN PLATELET VOLUME (BEAKER) 10.1 fL      9.4-12.4                

  



             (test code = 754)                                        

 

             NUCLEATED RED BLOOD CELLS 0 /100 WBC   0-0                       



             (BEAKER) (test code = 413)                                        

 

             NEUTROPHILS RELATIVE PERCENT 72 %                                  

 



             (BEAKER) (test code = 429)                                        

 

             LYMPHOCYTES RELATIVE PERCENT 14 %                                  

 



             (BEAKER) (test code = 430)                                        

 

             MONOCYTES RELATIVE PERCENT 9 %                                    



             (BEAKER) (test code = 431)                                        

 

             EOSINOPHILS RELATIVE PERCENT 4 %                                   

 



             (BEAKER) (test code = 432)                                        

 

             BASOPHILS RELATIVE PERCENT 1 %                                    



             (BEAKER) (test code = 437)                                        

 

             NEUTROPHILS ABSOLUTE COUNT 6.16 K/ L    1.78-5.38    H            



             (BEAKER) (test code = 670)                                        

 

             LYMPHOCYTES ABSOLUTE COUNT 1.22 K/ L    1.32-3.57    L            



             (BEAKER) (test code = 414)                                        

 

             MONOCYTES ABSOLUTE COUNT (BEAKER) 0.80 K/ L    0.30-0.82           

      



             (test code = 415)                                        

 

             EOSINOPHILS ABSOLUTE COUNT 0.36 K/ L    0.04-0.54                 



             (BEAKER) (test code = 416)                                        

 

             BASOPHILS ABSOLUTE COUNT (BEAKER) 0.05 K/ L    0.01-0.08           

      



             (test code = 417)                                        

 

             IMMATURE GRANULOCYTES-RELATIVE 0 %          0-1                    

   



             PERCENT (BEAKER) (test code =                                      

  



             2801)                                               



POCT-GLUCOSE [id number] 00:06:35





             Test Item    Value        Reference Range Interpretation Comments

 

             POC-GLUCOSE METER 138 mg/dL           H            : TESTED A

T [facility] [address]



             (BEAKER) (test code =                                        CINTH[place] ALDA[place] [facility],



             1538)                                               [id number]:



                                                                 /Techni

matilda ID



                                                                 = [id number] for HALEIGH VELÁZQUEZ



SARS-COV2/RT-PCR ([facility]; [facility])2022 22:57:17





             Test Item    Value        Reference Range Interpretation Comments

 

             SARS-COV2/RT-PCR Negative     Negative                  The SARS-Co

V-2 target



             (test code =                                        nucleic acids a

re not



             [id number])                                            detected in thi

s specimen.



                                                                 Negative result

s do not



                                                                 preclude SARS-C

oV-2



                                                                 infection and s

hould not be



                                                                 used as the sol

e basis for



                                                                 patient managem

ent



                                                                 decisions. Nega

tive results



                                                                 must be combine

d with



                                                                 clinical observ

ations,



                                                                 patient history

, and



                                                                 epidemiological

 information.



                                                                 A false negativ

e result may



                                                                 occur if a spec

imen is



                                                                 improperly noam

ected,



                                                                 transported or 

handled. This



                                                                 SARS CoV-2 test

 is a rapid,



                                                                 real-time RT-PC

R test



                                                                 intended for th

e qualitative



                                                                 detection of nu

cleic acid



                                                                 from SARS-CoV-2

 in a



                                                                 nasopharyngeal 

swab specimen



                                                                 collected from 

individuals



                                                                 suspected of CO

VID-19 by



                                                                 their healthcar

e provider.



This test has been authorized by FDA under an EUA for use by authorized 
laboratories. This test is only authorized for the duration of the declaration 
that circumstances exist justifying the authorization of emergency use of in 
vitro diagnostic tests for detection and/or diagnosis of COVID-19 under Section 
564(b)(1) of the Federal Food, Drug and Cosmetic Act, 21 U.S.C. 360bbb-3(b)(1), 
unless the authorization is terminated or revoked sooner. Fact Sheet for 
Healthcare Providers: https://Feidee.UrbanBound/Documents/Xpert%20Xpress%20SARS%20CoV-2/Fact%20Sheets/302-3802%10MZKM-EXC-2%20

HEALTHCARE%20PROVIDERS%20FACT%20SHEET.pdf Fact Sheet for Healthcare Patients: 
https://www.Retrevo/Documents/Xpert%20Xp
ress%20SARS%20CoV-2/Fact%20Sheets/302-3801%71VSBS-DRG-8%20PATIENT%20FACT%20SHEET

.pdfURINALYSIS W/ REFLEX URINE [id number] 20:40:35





             Test Item    Value        Reference Range Interpretation Comments

 

             COLOR (BEAKER) (test code = 470) Yellow                            

     

 

             CLARITY (BEAKER) (test code = 469) Clear                           

       

 

             SPECIFIC GRAVITY UA (BEAKER) (test 1.010        1.001-1.035        

       



             code = 468)                                         

 

             PH UA (BEAKER) (test code = 467) 5.5          5.0-8.0              

     

 

             PROTEIN UA (BEAKER) (test code = Negative     Negative             

     



             464)                                                

 

             GLUCOSE UA (BEAKER) (test code = Negative     Negative             

     



             365)                                                

 

             KETONES UA (BEAKER) (test code = Negative     Negative             

     



             371)                                                

 

             BILIRUBIN UA (BEAKER) (test code = Negative     Negative           

       



             462)                                                

 

             BLOOD UA (BEAKER) (test code = 461) Negative     Negative          

        

 

             NITRITE UA (BEAKER) (test code = Negative     Negative             

     



             465)                                                

 

             LEUKOCYTE ESTERASE UA (BEAKER) Negative     Negative               

   



             (test code = 466)                                        

 

             UROBILINOGEN UA (BEAKER) (test code 0.2 mg/dL    0.2-1.0           

        



             = 463)                                              

 

             BACTERIA (BEAKER) (test code = 517) Few                            

        

 

             RBC UA-MANUAL (BEAKER) (test code = <5 /HPF                        

        



             1659)                                               

 

             WBC UA-MANUAL (BEAKER) (test code = <5 /HPF                        

        



             1661)                                               

 

             SQUAMOUS EPITHELIAL MANUAL (BEAKER) <5 /HPF                        

        



             (test code = 1663)                                        

 

             SOURCE(BEAKER) (test code = 2795)                                  

      



RAD, CHEST, 2 [id number] 20:22:00Reason for exam:-&gt;HYPERTENSIONReason 
for exam:-&gt;DIZZINESSShould this be performed at the bedside?-&gt;No
************************************************************[facility] : 1955 Sex: 
M************************************************************FINAL REPORT 
PATIENT ID: [id number] CLINICAL HISTORY: HYPERTENSIONDIZZINESS 3 upright images of
the chest are submitted. COMPARISON:2020 The cardiac silhouette is within 
normal limits for size. The patient has undergone previous CABG. There is no 
focal consolidation, pleural effusion, pneumothorax or evidence of overt 
pulmonary edema. There is no acute bony abnormality. IMPRESSION: No acute 
abnormality. Signed: Rene Alicea Verified Date/Time: 2022 
20:22:26 Electronically signed by: RENE ALICEA M.D. on 2022 08:22 PM
RAPID TROPONIN [id number] 19:05:22





             Test Item    Value        Reference Range Interpretation Comments

 

             RAPID TROPONIN I (BEAKER) (test code < ng/mL      <0.05            

         



             = 1483)                                             



BASIC METABOLIC [id number] 19:02:06





             Test Item    Value        Reference Range Interpretation Comments

 

             SODIUM (BEAKER) 138 meq/L    135-148                   



             (test code = 381)                                        

 

             POTASSIUM    4.0 meq/L    3.6-5.5                   



             (BEAKER) (test                                        



             code = 379)                                         

 

             CHLORIDE (BEAKER) 103 meq/L                        



             (test code = 382)                                        

 

             CO2 (BEAKER) 21 meq/L     24-32        L            



             (test code = 355)                                        

 

             BLOOD UREA   40 mg/dL     10-26        H            



             NITROGEN (BEAKER)                                        



             (test code = 354)                                        

 

             CREATININE   4.26 mg/dL   0.50-1.20    H            



             (BEAKER) (test                                        



             code = 358)                                         

 

             GLUCOSE RANDOM 89 mg/dL                         



             (BEAKER) (test                                        



             code = 652)                                         

 

             CALCIUM (BEAKER) 9.7 mg/dL    8.5-10.5                  



             (test code = 697)                                        

 

             EGFR (BEAKER) 14                                      Interpretatio

n of eGFR



             (test code = mL/min/1.73                            values Stage De

scription



             1092)        sq m                                   Result G1 Leah

l or high



                                                                 >=90 G2 Mildly 

decreased



                                                                 60-89 G3a Mildl

y to



                                                                 moderately 45-5

9 G3b



                                                                 Moderately to s

everely



                                                                 30-44 G4 Severl

y decreased



                                                                 15-29 G5 Kidney

 failure



                                                                 <15Reported eGF

R is based



                                                                 on the CKD-EPI 





                                                                 equation that d

oes not use



                                                                 a race



                                                                 coefficientEsti

mated GFR



                                                                 is not as accur

ate as



                                                                 Creatinine Tamiko

latosha in



                                                                 predicting glom

erular



                                                                 filtration rate

. Estimated



                                                                 GFR is not appl

icable for



                                                                 dialysis patien

ts



CBC W/PLT COUNT &amp; AUTO [id number] 18:53:52





             Test Item    Value        Reference Range Interpretation Comments

 

             WHITE BLOOD CELL COUNT (BEAKER) 7.7 K/ L     4.0-10.0              

    



             (test code = 775)                                        

 

             RED BLOOD CELL COUNT (BEAKER) 3.89 M/ L    4.20-5.80    L          

  



             (test code = 761)                                        

 

             HEMOGLOBIN (BEAKER) (test code = 10.4 GM/DL   13.0-16.8    L       

     



             410)                                                

 

             HEMATOCRIT (BEAKER) (test code = 32.1 %       40.0-50.0    L       

     



             411)                                                

 

             MEAN CORPUSCULAR VOLUME (BEAKER) 82.4 fL      82.0-98.0            

     



             (test code = 753)                                        

 

             MEAN CORPUSCULAR HEMOGLOBIN 26.8 pg      27.0-33.0    L            



             (BEAKER) (test code = 751)                                        

 

             MEAN CORPUSCULAR HEMOGLOBIN CONC 32.5 GM/DL   32.0-36.0            

     



             (BEAKER) (test code = 752)                                        

 

             RED CELL DISTRIBUTION WIDTH 14.3 %       10.3-14.2    H            



             (BEAKER) (test code = 412)                                        

 

             PLATELET COUNT (BEAKER) (test 195 K/CU MM  150-430                 

  



             code = 756)                                         

 

             MEAN PLATELET VOLUME (BEAKER) 8.1 fL       6.5-10.5                

  



             (test code = 754)                                        

 

             NEUTROPHILS RELATIVE PERCENT 66 %                                  

 



             (BEAKER) (test code = 429)                                        

 

             LYMPHOCYTES RELATIVE PERCENT 17 %                                  

 



             (BEAKER) (test code = 430)                                        

 

             MONOCYTES RELATIVE PERCENT 10 %                                   



             (BEAKER) (test code = 431)                                        

 

             EOSINOPHILS RELATIVE PERCENT 6 %                                   

 



             (BEAKER) (test code = 432)                                        

 

             BASOPHILS RELATIVE PERCENT 1 %                                    



             (BEAKER) (test code = 437)                                        

 

             NEUTROPHILS ABSOLUTE COUNT 5.12 K/ L    1.80-8.00                 



             (BEAKER) (test code = 670)                                        

 

             LYMPHOCYTES ABSOLUTE COUNT 1.33 K/ L    1.48-4.50    L            



             (BEAKER) (test code = 414)                                        

 

             MONOCYTES ABSOLUTE COUNT (BEAKER) 0.80 K/ L    0.00-1.30           

      



             (test code = 415)                                        

 

             EOSINOPHILS ABSOLUTE COUNT 0.45 K/ L    0.00-0.50                 



             (BEAKER) (test code = 416)                                        

 

             BASOPHILS ABSOLUTE COUNT (BEAKER) 0.04 K/ L    0.00-0.20           

      



             (test code = 417)                                        



FB Removal- [id number] 09:17:00Blanche Rosenberg MD ? ? 2021 ?4:17 
AMFB Removal- OrificePerformed by: Blanche Rosenberg MDAuthorized by: Blanche Rosenberg MD Consent: ?Consent obtained: ?Verbal ?Consent given by: ?Patient 
?Risksdiscussed: ?Bleeding, damage to surrounding structures, incomplete 
removal, infection, need for surgical removal, pain, TM perforation and 
worsening of condition ?Alternatives discussed: ?No treatmentLocation: 
?Location: ?Ear ?Ear location: ?L earPre-procedure details: ?Imaging: 
?NoneAnesthesia (see MAR for exact dosages): ?Topical anesthetic: ?Lidocaine 
gelProcedure details: ?Localization method: ?Direct visualization ?Removal 
mechanism: ?Alligator forceps, suction and irrigation ?Procedure complexity: 
?Complex ?Foreign bodies recovered: ?1 ?Description: ?[place] [facility] [employer] cover 
?Intact foreign body removal: yes ?Post-procedure details: ?Confirmation: ?No 
additional foreign bodies on visualization ?Patient tolerance of procedure: 
?Tolerated well, no immediate complicationsUnUT [facility][U]
 XRAY KNEE 4 OR MORE VWS BILATERAL [id number] 08:23:00Images acquired, not 
reported on this accession number.UT PhysiciansPOCT-GLUCOSE [id number] 
12:11:00





             Test Item    Value        Reference Range Interpretation Comments

 

             POC-GLUCOSE METER 253 mg/dL           H            : TESTED A

T [facility] [address]



             (BEAKER) (test code =                                        [facility],



             1538)                                               [id number]:



                                                                 /Techni

matilda ID



                                                                 = [id number] for BIN

NTER,



                                                                 HIWITHA



POCT-GLUCOSE [id number] 07:40:00





             Test Item    Value        Reference Range Interpretation Comments

 

             POC-GLUCOSE METER 160 mg/dL           H            : TESTED A

T [facility] [address]



             (BEAKER) (test code =                                        [facility],



             1538)                                               [address]:



                                                                 /Techni

matilda ID



                                                                 = [id number] for HU

NTER,



                                                                 HIWITHA



BASIC METABOLIC [id number] 06:13:00





             Test Item    Value        Reference Range Interpretation Comments

 

             SODIUM (BEAKER) 139 meq/L    136-145                   



             (test code = 381)                                        

 

             POTASSIUM (BEAKER) 3.9 meq/L    3.5-5.1                   



             (test code = 379)                                        

 

             CHLORIDE (BEAKER) 109 meq/L           H            



             (test code = 382)                                        

 

             CO2 (BEAKER) (test 17 meq/L     22-29        L            



             code = 355)                                         

 

             BLOOD UREA NITROGEN 19 mg/dL     7-21                      



             (BEAKER) (test code                                        



             = 354)                                              

 

             CREATININE (BEAKER) 1.57 mg/dL   0.57-1.25    H            



             (test code = 358)                                        

 

             GLUCOSE RANDOM 154 mg/dL           H            



             (BEAKER) (test code                                        



             = 652)                                              

 

             CALCIUM (BEAKER) 8.9 mg/dL    8.4-10.2                  



             (test code = 697)                                        

 

             EGFR (BEAKER) (test 44 mL/min/1.73                           ESTIMA

TERRENCE GFR IS



             code = 1092) sq m                                   NOT AS ACCURATE

 AS



                                                                 CREATININE



                                                                 CLEARANCE IN



                                                                 PREDICTING



                                                                 GLOMERULAR



                                                                 FILTRATION RATE

.



                                                                 ESTIMATED GFR I

S



                                                                 NOT APPLICABLE 

FOR



                                                                 DIALYSIS PATIEN

TS.



 ID - PATIENCE [facility] (HEMOGRAM ONLY)2020 05:34:00





             Test Item    Value        Reference Range Interpretation Comments

 

             WHITE BLOOD CELL COUNT (BEAKER) 10.0 K/ L    3.5-10.5              

    



             (test code = 775)                                        

 

             RED BLOOD CELL COUNT (BEAKER) 4.43 M/ L    4.63-6.08    L          

  



             (test code = 761)                                        

 

             HEMOGLOBIN (BEAKER) (test code = 12.2 GM/DL   13.7-17.5    L       

     



             410)                                                

 

             HEMATOCRIT (BEAKER) (test code = 38.8 %       40.1-51.0    L       

     



             411)                                                

 

             MEAN CORPUSCULAR VOLUME (BEAKER) 87.6 fL      79.0-92.2            

     



             (test code = 753)                                        

 

             MEAN CORPUSCULAR HEMOGLOBIN 27.5 pg      25.7-32.2                 



             (BEAKER) (test code = 751)                                        

 

             MEAN CORPUSCULAR HEMOGLOBIN CONC 31.4 GM/DL   32.3-36.5    L       

     



             (BEAKER) (test code = 752)                                        

 

             RED CELL DISTRIBUTION WIDTH 13.2 %       11.6-14.4                 



             (BEAKER) (test code = 412)                                        

 

             PLATELET COUNT (BEAKER) (test 172 K/CU MM  150-450                 

  



             code = 756)                                         

 

             MEAN PLATELET VOLUME (BEAKER) 10.4 fL      9.4-12.4                

  



             (test code = 754)                                        

 

             NUCLEATED RED BLOOD CELLS 0 /100 WBC   0-0                       



             (BEAKER) (test code = 413)                                        



POCT-GLUCOSE [id number] 21:23:00





             Test Item    Value        Reference Range Interpretation Comments

 

             POC-GLUCOSE METER 227 mg/dL           H            : TESTED A

T [facility] [address]



             (BEAKER) (test code =                                        [facility],



             153)                                               [address]:



                                                                 /Techni

matilda ID



                                                                 = [id number] for KOLTON MORGAN



POCT-GLUCOSE [id number] 16:26:00





             Test Item    Value        Reference Range Interpretation Comments

 

             POC-GLUCOSE METER 188 mg/dL           H            : TESTED A

T [facility] [address]



             (BEAKER) (test code =                                        [facility],



             153)                                               [address]:



                                                                 /Techni

matilda ID



                                                                 = [id number] for [place]

LLIAMS,



                                                                 TYNEKA



POCT-GLUCOSE [id number] 11:59:00





             Test Item    Value        Reference Range Interpretation Comments

 

             POC-GLUCOSE METER 172 mg/dL           H            : TESTED A

T [facility] [address]



             (BEAKER) (test code =                                        [facility],



             153)                                               [address]:



                                                                 /Techni

matilda ID



                                                                 = [id number] for [place]

LLIAMS,



                                                                 TYNEKA



POCT-GLUCOSE [id number] 08:11:00





             Test Item    Value        Reference Range Interpretation Comments

 

             POC-GLUCOSE METER 147 mg/dL           H            : TESTED A

T [facility] [address]



             (BEAKER) (test code =                                        [place],



             [address]38)                                               [id number]:



                                                                 /Techni

matilda ID



                                                                 = [id number] for DELORES DIA



[id number] 04:10:00





             Test Item    Value        Reference Range Interpretation Comments

 

             MAGNESIUM (BEAKER) (test code = 1.9 mg/dL    1.6-2.6               

    



             627)                                                



 ID - PATIENCE MBASIC METABOLIC [id number] 04:10:00





             Test Item    Value        Reference Range Interpretation Comments

 

             SODIUM (BEAKER) 139 meq/L    136-145                   



             (test code = 381)                                        

 

             POTASSIUM (BEAKER) 4.0 meq/L    3.5-5.1                   



             (test code = 379)                                        

 

             CHLORIDE (BEAKER) 108 meq/L           H            



             (test code = 382)                                        

 

             CO2 (BEAKER) (test 22 meq/L     22-29                     



             code = 355)                                         

 

             BLOOD UREA NITROGEN 18 mg/dL     7-21                      



             (BEAKER) (test code                                        



             = 354)                                              

 

             CREATININE (BEAKER) 1.36 mg/dL   0.57-1.25    H            



             (test code = 358)                                        

 

             GLUCOSE RANDOM 126 mg/dL           H            



             (BEAKER) (test code                                        



             = 652)                                              

 

             CALCIUM (BEAKER) 9.1 mg/dL    8.4-10.2                  



             (test code = 697)                                        

 

             EGFR (BEAKER) (test 52 mL/min/1.73                           ESTIMA

TERRENCE GFR IS



             code = 1092) sq m                                   NOT AS ACCURATE

 AS



                                                                 CREATININE



                                                                 CLEARANCE IN



                                                                 PREDICTING



                                                                 GLOMERULAR



                                                                 FILTRATION RATE

.



                                                                 ESTIMATED GFR I

S



                                                                 NOT APPLICABLE 

FOR



                                                                 DIALYSIS PATIEN

TS.



 ID - PATIENCE [facility] (HEMOGRAM ONLY)2020 03:46:00





             Test Item    Value        Reference Range Interpretation Comments

 

             WHITE BLOOD CELL COUNT (BEAKER) 10.3 K/ L    3.5-10.5              

    



             (test code = 775)                                        

 

             RED BLOOD CELL COUNT (BEAKER) 4.45 M/ L    4.63-6.08    L          

  



             (test code = 761)                                        

 

             HEMOGLOBIN (BEAKER) (test code = 12.3 GM/DL   13.7-17.5    L       

     



             410)                                                

 

             HEMATOCRIT (BEAKER) (test code = 39.3 %       40.1-51.0    L       

     



             411)                                                

 

             MEAN CORPUSCULAR VOLUME (BEAKER) 88.3 fL      79.0-92.2            

     



             (test code = 753)                                        

 

             MEAN CORPUSCULAR HEMOGLOBIN 27.6 pg      25.7-32.2                 



             (BEAKER) (test code = 751)                                        

 

             MEAN CORPUSCULAR HEMOGLOBIN CONC 31.3 GM/DL   32.3-36.5    L       

     



             (BEAKER) (test code = 752)                                        

 

             RED CELL DISTRIBUTION WIDTH 13.2 %       11.6-14.4                 



             (BEAKER) (test code = 412)                                        

 

             PLATELET COUNT (BEAKER) (test 167 K/CU MM  150-450                 

  



             code = 756)                                         

 

             MEAN PLATELET VOLUME (BEAKER) 10.2 fL      9.4-12.4                

  



             (test code = 754)                                        

 

             NUCLEATED RED BLOOD CELLS 0 /100 WBC   0-0                       



             (BEAKER) (test code = 413)                                        



POCT-GLUCOSE [id number] 21:53:00





             Test Item    Value        Reference Range Interpretation Comments

 

             POC-GLUCOSE METER 167 mg/dL           H            : TESTED A

T [facility] [address]



             (BEAKER) (test code =                                        [facility],



             [facility]8)                                               [address]:



                                                                 /Techni

matilda ID



                                                                 = [id number] for KE

BE,



                                                                 SAUDATU



POCT-GLUCOSE [id number] 16:06:00





             Test Item    Value        Reference Range Interpretation Comments

 

             POC-GLUCOSE METER 135 mg/dL           H            : TESTED A

T [facility] [address]



             (BEAKER) (test code =                                        [facility],



             [facility]8)                                               [address]:



                                                                 /Techni

matilda ID



                                                                 = [id number] for BE

RNABE,



                                                                 CHRISTINE



POCT-GLUCOSE [id number] 12:15:00





             Test Item    Value        Reference Range Interpretation Comments

 

             POC-GLUCOSE METER 211 mg/dL           H            : TESTED A

T [facility] [address]



             (BEAKER) (test code =                                        [facility],



             1538)                                               [address]:



                                                                 /Techni

matilda ID



                                                                 = [id number] for BE

RNABE,



                                                                 CHRISTINE



POCT-GLUCOSE [id number] 08:25:00





             Test Item    Value        Reference Range Interpretation Comments

 

             POC-GLUCOSE METER 102 mg/dL                        : TESTED A

T [facility] [address]



             (BEAKER) (test code =                                        [facility],



             1538)                                               [address]:



                                                                 /Techni

matilda ID



                                                                 = [id number] for BE

RNABE,



                                                                 CHRISTINE



[id number] 06:07:00





             Test Item    Value        Reference Range Interpretation Comments

 

             MAGNESIUM (BEAKER) (test code = 2.0 mg/dL    1.6-2.6               

    



             627)                                                



 ID - PATIENCE MBASIC METABOLIC [id number] 06:07:00





             Test Item    Value        Reference Range Interpretation Comments

 

             SODIUM (BEAKER) 140 meq/L    136-145                   



             (test code = 381)                                        

 

             POTASSIUM (BEAKER) 4.2 meq/L    3.5-5.1                   



             (test code = 379)                                        

 

             CHLORIDE (BEAKER) 111 meq/L           H            



             (test code = 382)                                        

 

             CO2 (BEAKER) (test 21 meq/L     22-29        L            



             code = 355)                                         

 

             BLOOD UREA NITROGEN 17 mg/dL     7-21                      



             (BEAKER) (test code                                        



             = 354)                                              

 

             CREATININE (BEAKER) 1.20 mg/dL   0.57-1.25                 



             (test code = 358)                                        

 

             GLUCOSE RANDOM 98 mg/dL                         



             (BEAKER) (test code                                        



             = 652)                                              

 

             CALCIUM (BEAKER) 9.1 mg/dL    8.4-10.2                  



             (test code = 697)                                        

 

             EGFR (BEAKER) (test 60 mL/min/1.73                           ESTIMA

TERRENCE GFR IS



             code = 1092) sq m                                   NOT AS ACCURATE

 AS



                                                                 CREATININE



                                                                 CLEARANCE IN



                                                                 PREDICTING



                                                                 GLOMERULAR



                                                                 FILTRATION RATE

.



                                                                 ESTIMATED GFR I

S



                                                                 NOT APPLICABLE 

FOR



                                                                 DIALYSIS PATIEN

TS.



 ID - PATIENCE [facility] W/PLT COUNT &amp; AUTO [id number] 05:38:00





             Test Item    Value        Reference Range Interpretation Comments

 

             WHITE BLOOD CELL COUNT (BEAKER) 10.1 K/ L    3.5-10.5              

    



             (test code = 775)                                        

 

             RED BLOOD CELL COUNT (BEAKER) 4.39 M/ L    4.63-6.08    L          

  



             (test code = 761)                                        

 

             HEMOGLOBIN (BEAKER) (test code = 12.2 GM/DL   13.7-17.5    L       

     



             410)                                                

 

             HEMATOCRIT (BEAKER) (test code = 38.8 %       40.1-51.0    L       

     



             411)                                                

 

             MEAN CORPUSCULAR VOLUME (BEAKER) 88.4 fL      79.0-92.2            

     



             (test code = 753)                                        

 

             MEAN CORPUSCULAR HEMOGLOBIN 27.8 pg      25.7-32.2                 



             (BEAKER) (test code = 751)                                        

 

             MEAN CORPUSCULAR HEMOGLOBIN CONC 31.4 GM/DL   32.3-36.5    L       

     



             (BEAKER) (test code = 752)                                        

 

             RED CELL DISTRIBUTION WIDTH 13.2 %       11.6-14.4                 



             (BEAKER) (test code = 412)                                        

 

             PLATELET COUNT (BEAKER) (test 177 K/CU MM  150-450                 

  



             code = 756)                                         

 

             MEAN PLATELET VOLUME (BEAKER) 10.5 fL      9.4-12.4                

  



             (test code = 754)                                        

 

             NUCLEATED RED BLOOD CELLS 0 /100 WBC   0-0                       



             (BEAKER) (test code = 413)                                        

 

             NEUTROPHILS RELATIVE PERCENT 71 %                                  

 



             (BEAKER) (test code = 429)                                        

 

             LYMPHOCYTES RELATIVE PERCENT 15 %                                  

 



             (BEAKER) (test code = 430)                                        

 

             MONOCYTES RELATIVE PERCENT 8 %                                    



             (BEAKER) (test code = 431)                                        

 

             EOSINOPHILS RELATIVE PERCENT 5 %                                   

 



             (BEAKER) (test code = 432)                                        

 

             BASOPHILS RELATIVE PERCENT 1 %                                    



             (BEAKER) (test code = 437)                                        

 

             NEUTROPHILS ABSOLUTE COUNT 7.17 K/ L    1.78-5.38    H            



             (BEAKER) (test code = 670)                                        

 

             LYMPHOCYTES ABSOLUTE COUNT 1.51 K/ L    1.32-3.57                 



             (BEAKER) (test code = 414)                                        

 

             MONOCYTES ABSOLUTE COUNT (BEAKER) 0.85 K/ L    0.30-0.82    H      

      



             (test code = 415)                                        

 

             EOSINOPHILS ABSOLUTE COUNT 0.45 K/ L    0.04-0.54                 



             (BEAKER) (test code = 416)                                        

 

             BASOPHILS ABSOLUTE COUNT (BEAKER) 0.06 K/ L    0.01-0.08           

      



             (test code = 417)                                        

 

             IMMATURE GRANULOCYTES-RELATIVE 0 %          0-1                    

   



             PERCENT (BEAKER) (test code =                                      

  



             2801)                                               



[id number] 00:24:00





             Test Item    Value        Reference Range Interpretation Comments

 

             ACTIVATED CLOTTING TIME 109 sec                                : 74

-137 seconds,



             (BEAKER) (test code =                                        Baseli

ne: TESTED AT



             441)                                                [facility] [address] [facility], [address]

30:



                                                                 /Techni

matilda ID



                                                                 = [id number] for



                                                                 GERONIMO GIRON

MI



POCT-GLUCOSE [id number] 20:30:00





             Test Item    Value        Reference Range Interpretation Comments

 

             POC-GLUCOSE METER 187 mg/dL           H            : TESTED A

T [facility] [address]



             (BEAKER) (test code =                                        [facility],



             1538)                                               [address]:



                                                                 /Techni

matilda ID



                                                                 = [id number] for JOVITA TIMI



[id number] 20:27:00





             Test Item    Value        Reference Range Interpretation Comments

 

             ACTIVATED CLOTTING TIME 153 sec                                : 74

-137 seconds,



             (BEAKER) (test code =                                        Baseli

ne: TESTED AT



             441)                                                [address], [facility]

30:



                                                                 /Techni

matilda ID



                                                                 = [id number] for



                                                                 GERONIMO GIRON



[id number] 19:10:00





             Test Item    Value        Reference Range Interpretation Comments

 

             ACTIVATED CLOTTING TIME 175 sec                                : 74

-137 seconds,



             (BEAKER) (test code =                                        Baseli

ne: TESTED AT



             [facility])                                                [address], [facility]

30:



                                                                 /Techni

matilda ID



                                                                 = [id number] for NESTOR MARIA



POCT-GLUCOSE [id number] 17:38:00





             Test Item    Value        Reference Range Interpretation Comments

 

             POC-GLUCOSE METER 105 mg/dL                        : TESTED A

T [place] [address]



             (BEAKER) (test code =                                        [facility],



             1538)                                               [address]:



                                                                 /Techni

matilda ID



                                                                 = [id number] for BE

RNABE,



                                                                 CHRISTINE



[id number] 17:03:00





             Test Item    Value        Reference Range Interpretation Comments

 

             ACTIVATED CLOTTING TIME 224 sec                                : 74

-137 seconds,



             (BEAKER) (test code =                                        Baseli

ne: TESTED AT



             [facility])                                                [address], [facility]

30:



                                                                 /Techni

matilda ID



                                                                 = [id number] for DE

NNIS,



                                                                 ANGEL



[id number] 16:48:00





             Test Item    Value        Reference Range Interpretation Comments

 

             ACTIVATED CLOTTING TIME 263 sec                                : 74

-137 seconds,



             (BEAKER) (test code =                                        Baseli

ne: TESTED AT



             [facility])                                                [address], [facility]

30:



                                                                 /Techni

matilda ID



                                                                 = [id number] for At

chison,



                                                                 Rebecca



[id number] 16:06:00





             Test Item    Value        Reference Range Interpretation Comments

 

             ACTIVATED CLOTTING TIME 230 sec                                : 74

-137 seconds,



             (BEAKER) (test code =                                        Baseli

ne: TESTED AT



             [facility])                                                [address], [facility]

30:



                                                                 /Techni

matilda ID



                                                                 = [id number] for At

chison,



                                                                 Rebecca



[id number] 15:54:00





             Test Item    Value        Reference Range Interpretation Comments

 

             ACTIVATED CLOTTING TIME 219 sec                                : 74

-137 seconds,



             (BEAKER) (test code =                                        Baseli

ne: TESTED AT



             441)                                                [facility] [address]



                                                                 [facility], [address]

30:



                                                                 /Techni

matilda ID



                                                                 = [id number] for At

Rebecca geller



POCT-GLUCOSE [id number] 14:00:00





             Test Item    Value        Reference Range Interpretation Comments

 

             POC-GLUCOSE METER 127 mg/dL           H            : TESTED A

T [facility] [address]



             (CHARISSE) (test code =                                        CINTHYA ALDANA [facility],



             1538)                                               [address]:



                                                                 /Techni

matilda ID



                                                                 = [id number] for CHRISTINE LEMOS



SARS-COV2/RT-PCR ([facility])2020 11:00:00





             Test Item    Value        Reference Range Interpretation Comments

 

             SARS-COV2/RT-PCR (test Negative     Not Detected, Negative,        

      



             code = 6848254)              See external report for              



                                       linked test               

 

             SARS-COV-2 PERFORMING LAB [facility]                             



             (test code = 4902208)                                        



Negative result for this test determines that SARS-CoV-2 RNA was not present in 
the specimen above the Limit of Detection (LOD). However, Negative results do 
not preclude SARS-CoV-2 infection and should not be used as the sole basis for 
treatment or patient management decisions. Negative results must be combined 
with clinical observations, patient history, and epidemiological information. A 
false negative result may occur if a specimen is improperly collected, 
transported or handled. A false negative result should be considered if 
patient's recent exposures or clinical presentation indicate that COVID-19 
(SARS-CoV-2) is likely and diagnostic tests for other causes of illness are 
negative. Re-testing should be considered in cases of suspected false 
negatives.The limit of detection for this assay is 800 copies/mL.This SARS CoV-2
 test is a real-time RT-PCR test intended for the qualitative detection of 
nucleic acid from SARS-CoV-2 in a nasopharyngeal swab specimen collected from 
individuals suspected of COVID-19 by their healthcare provider.This test has not
 been Food and Drug Administration (FDA) cleared or approved. This is a modified
 version of an approved Emergency Use Authorization (EUA) and is in the process 
of review by the FDA. Once authorized by the FDA, the issued EUA will be 
effective until the declaration that circumstances exist justifying the 
authorization of the emergency use ofin vitro diagnostic tests for detection 
and/or diagnosis of COVID-19 is terminated under Section 564(b)(2) of the Act or
 the EUA is revoked under Section 564(g) of the Act.Fact Sheet for Healthcare 
Prov
iders:https://www.[employer]/sites/default/files/product/documents/Fact_Sheet_HC

_Sctiifhav_Bcrh_NLWQ-AhV-3.pdfFact Sheet for Healthcare 
Patients:https://www.[employer]/sites/default/files/product/docume
nts/Lqqs_Afcmd_Qzbrfndf_Oger_YNKK-BbA-6.pdfPerforming Laboratory:[facility] [place], [place] [id number] 07:22:00





             Test Item    Value        Reference Range Interpretation Comments

 

             PARTIAL THROMBOPLASTIN TIME 49.7 seconds 22.5-36.0    H            



             (BEAKER) (test code = 760)                                        



6 hours after starting heparin infusion and as indicated per sliding scale
TROPONIN -21-48 03:08:00





             Test Item    Value        Reference Range Interpretation Comments

 

             TROPONIN I (BEAKER) (test code = 0.11 ng/mL   0.00-0.03    H       

     



             397)                                                








             Test Item    Value        Reference Range Interpretation Comments

 

             B-TYPE NATRIURETIC PEPTIDE (BEAKER) 114 pg/mL    0-100        H    

        



             (test code = 700)                                        



 ID - JULIA [id number] 03:00:00





             Test Item    Value        Reference Range Interpretation Comments

 

             MAGNESIUM (BEAKER) (test code = 1.7 mg/dL    1.6-2.6               

    



             627)                                                



 ID - JULIA LBASIC METABOLIC [id number] 03:00:00





             Test Item    Value        Reference Range Interpretation Comments

 

             SODIUM (BEAKER) 137 meq/L    136-145                   



             (test code = 381)                                        

 

             POTASSIUM (BEAKER) 4.3 meq/L    3.5-5.1                   



             (test code = 379)                                        

 

             CHLORIDE (BEAKER) 109 meq/L           H            



             (test code = 382)                                        

 

             CO2 (BEAKER) (test 21 meq/L     22-29        L            



             code = 355)                                         

 

             BLOOD UREA NITROGEN 19 mg/dL     7-21                      



             (BEAKER) (test code                                        



             = 354)                                              

 

             CREATININE (BEAKER) 1.62 mg/dL   0.57-1.25    H            



             (test code = 358)                                        

 

             GLUCOSE RANDOM 131 mg/dL           H            



             (BEAKER) (test code                                        



             = 652)                                              

 

             CALCIUM (BEAKER) 9.0 mg/dL    8.4-10.2                  



             (test code = 697)                                        

 

             EGFR (BEAKER) (test 43 mL/min/1.73                           ESTIMA

TERRENCE GFR IS



             code = 1092) sq m                                   NOT AS ACCURATE

 AS



                                                                 CREATININE



                                                                 CLEARANCE IN



                                                                 PREDICTING



                                                                 GLOMERULAR



                                                                 FILTRATION RATE

.



                                                                 ESTIMATED GFR I

S



                                                                 NOT APPLICABLE 

FOR



                                                                 DIALYSIS PATIEN

TS.



 ID - PIAYA LCBC W/PLT COUNT &amp; AUTO [id number] 02:34:00





             Test Item    Value        Reference Range Interpretation Comments

 

             WHITE BLOOD CELL COUNT (BEAKER) 9.9 K/ L     3.5-10.5              

    



             (test code = 775)                                        

 

             RED BLOOD CELL COUNT (BEAKER) 4.17 M/ L    4.63-6.08    L          

  



             (test code = 761)                                        

 

             HEMOGLOBIN (BEAKER) (test code = 11.6 GM/DL   13.7-17.5    L       

     



             410)                                                

 

             HEMATOCRIT (BEAKER) (test code = 37.1 %       40.1-51.0    L       

     



             411)                                                

 

             MEAN CORPUSCULAR VOLUME (BEAKER) 89.0 fL      79.0-92.2            

     



             (test code = 753)                                        

 

             MEAN CORPUSCULAR HEMOGLOBIN 27.8 pg      25.7-32.2                 



             (BEAKER) (test code = 751)                                        

 

             MEAN CORPUSCULAR HEMOGLOBIN CONC 31.3 GM/DL   32.3-36.5    L       

     



             (BEAKER) (test code = 752)                                        

 

             RED CELL DISTRIBUTION WIDTH 13.2 %       11.6-14.4                 



             (BEAKER) (test code = 412)                                        

 

             PLATELET COUNT (BEAKER) (test 184 K/CU MM  150-450                 

  



             code = 756)                                         

 

             MEAN PLATELET VOLUME (BEAKER) 10.5 fL      9.4-12.4                

  



             (test code = 754)                                        

 

             NUCLEATED RED BLOOD CELLS 0 /100 WBC   0-0                       



             (BEAKER) (test code = 413)                                        

 

             NEUTROPHILS RELATIVE PERCENT 65 %                                  

 



             (BEAKER) (test code = 429)                                        

 

             LYMPHOCYTES RELATIVE PERCENT 21 %                                  

 



             (BEAKER) (test code = 430)                                        

 

             MONOCYTES RELATIVE PERCENT 9 %                                    



             (BEAKER) (test code = 431)                                        

 

             EOSINOPHILS RELATIVE PERCENT 4 %                                   

 



             (BEAKER) (test code = 432)                                        

 

             BASOPHILS RELATIVE PERCENT 0 %                                    



             (BEAKER) (test code = 437)                                        

 

             NEUTROPHILS ABSOLUTE COUNT 6.37 K/ L    1.78-5.38    H            



             (BEAKER) (test code = 670)                                        

 

             LYMPHOCYTES ABSOLUTE COUNT 2.10 K/ L    1.32-3.57                 



             (BEAKER) (test code = 414)                                        

 

             MONOCYTES ABSOLUTE COUNT (BEAKER) 0.87 K/ L    0.30-0.82    H      

      



             (test code = 415)                                        

 

             EOSINOPHILS ABSOLUTE COUNT 0.42 K/ L    0.04-0.54                 



             (BEAKER) (test code = 416)                                        

 

             BASOPHILS ABSOLUTE COUNT (BEAKER) 0.04 K/ L    0.01-0.08           

      



             (test code = 417)                                        

 

             IMMATURE GRANULOCYTES-RELATIVE 1 %          0-1                    

   



             PERCENT (BEAKER) (test code =                                      

  



             2801)                                               



RAD, CHEST, 1 VIEW, NON [id number] 22:17:00Reason for exam:-&gt;CHEST 
PAINShould this be performed at the bedside?-&gt;YesFINAL REPORT PATIENT ID: 
[id number] Chest, 1 view. History: Chest pain Comparison: Plain radiograph the 
chest dated 2020.. IMPRESSION: Postsurgical changes of median sternotomy 
with fracture of the second most superior sternotomy wire, unchanged. Coronary 
artery stents.The cardiomediastinal silhouette and pulmonary vasculature are 
within normal limits for a portable exam. The lungs are clear without evidence 
of consolidation or effusion. No pneumothorax. No acute osseous abnormality. 
[facility] Verified Date/Time: 2020 22:17:33 
Electronically signed by: MARGY COX MD on 2020 10:17 PMCBC 
W/PLT COUNT &amp; AUTO [id number] 22:12:00





             Test Item    Value        Reference Range Interpretation Comments

 

             WHITE BLOOD CELL COUNT (BEAKER) 9.7 K/ L     4.0-10.0              

    



             (test code = 775)                                        

 

             RED BLOOD CELL COUNT (BEAKER) 4.45 M/ L    4.20-5.80               

  



             (test code = 761)                                        

 

             HEMOGLOBIN (BEAKER) (test code = 12.8 GM/DL   13.0-16.8    L       

     



             410)                                                

 

             HEMATOCRIT (BEAKER) (test code = 38.1 %       40.0-50.0    L       

     



             411)                                                

 

             MEAN CORPUSCULAR VOLUME (BEAKER) 85.6 fL      82.0-98.0            

     



             (test code = 753)                                        

 

             MEAN CORPUSCULAR HEMOGLOBIN 28.8 pg      27.0-33.0                 



             (BEAKER) (test code = 751)                                        

 

             MEAN CORPUSCULAR HEMOGLOBIN CONC 33.6 GM/DL   32.0-36.0            

     



             (BEAKER) (test code = 752)                                        

 

             RED CELL DISTRIBUTION WIDTH 14.2 %       10.3-14.2                 



             (BEAKER) (test code = 412)                                        

 

             PLATELET COUNT (BEAKER) (test 182 K/CU MM  150-430                 

  



             code = 756)                                         

 

             MEAN PLATELET VOLUME (BEAKER) 8.2 fL       6.5-10.5                

  



             (test code = 754)                                        

 

             NEUTROPHILS RELATIVE PERCENT 68 %                                  

 



             (BEAKER) (test code = 429)                                        

 

             LYMPHOCYTES RELATIVE PERCENT 19 %                                  

 



             (BEAKER) (test code = 430)                                        

 

             MONOCYTES RELATIVE PERCENT 8 %                                    



             (BEAKER) (test code = 431)                                        

 

             EOSINOPHILS RELATIVE PERCENT 5 %                                   

 



             (BEAKER) (test code = 432)                                        

 

             BASOPHILS RELATIVE PERCENT 1 %                                    



             (BEAKER) (test code = 437)                                        

 

             NEUTROPHILS ABSOLUTE COUNT 6.64 K/ L    1.80-8.00                 



             (BEAKER) (test code = 670)                                        

 

             LYMPHOCYTES ABSOLUTE COUNT 1.81 K/ L    1.48-4.50                 



             (BEAKER) (test code = 414)                                        

 

             MONOCYTES ABSOLUTE COUNT (BEAKER) 0.73 K/ L    0.00-1.30           

      



             (test code = 415)                                        

 

             EOSINOPHILS ABSOLUTE COUNT 0.51 K/ L    0.00-0.50    H            



             (BEAKER) (test code = 416)                                        

 

             BASOPHILS ABSOLUTE COUNT (BEAKER) 0.05 K/ L    0.00-0.20           

      



             (test code = 417)                                        



B-TYPE NATRIURETIC FACTOR (BNP)2020 22:11:00





             Test Item    Value        Reference Range Interpretation Comments

 

             B-TYPE NATRIURETIC PEPTIDE (BEAKER) 78 pg/mL     0-100             

        



             (test code = 700)                                        



RAPID TROPONIN [id number] 22:09:00





             Test Item    Value        Reference Range Interpretation Comments

 

             RAPID TROPONIN I (BEAKER) (test code < ng/mL      <0.05            

         



             = 1483)                                             



[id number] 22:08:00





             Test Item    Value        Reference Range Interpretation Comments

 

             PROTIME (BEAKER) (test code = 759) 10.0 sec     9.8-12.0           

       

 

             INR (BEAKER) (test code = 370) 0.95 -       <=5.90                 

   

 

             PARTIAL THROMBOPLASTIN TIME 22.2 sec     25.8-34.5    L            



             (BEAKER) (test code = 760)                                        



RECOMMENDED COUMADIN/WARFARIN INR THERAPY RANGESSTANDARD DOSE: 2.0 - 3.0 
Includes: PROPHYLAXIS for venous thrombosis, systemic embolization; TREATMENT 
for venous thrombosis and/or pulmonary embolus.HIGH RISK: Target INR is 2.5-3.5 
for patients with mechanical heart valves.BASIC METABOLIC [id number] 
22:07:00





             Test Item    Value        Reference Range Interpretation Comments

 

             SODIUM (BEAKER) 139 meq/L    135-148                   



             (test code = 381)                                        

 

             POTASSIUM (BEAKER) 4.3 meq/L    3.6-5.5                   



             (test code = 379)                                        

 

             CHLORIDE (BEAKER) 105 meq/L                        



             (test code = 382)                                        

 

             CO2 (BEAKER) (test 26 meq/L     24-32                     



             code = 355)                                         

 

             BLOOD UREA NITROGEN 18 mg/dL     10-26                     



             (BEAKER) (test code                                        



             = 354)                                              

 

             CREATININE (BEAKER) 1.69 mg/dL   0.50-1.20    H            



             (test code = 358)                                        

 

             GLUCOSE RANDOM 215 mg/dL           H            



             (BEAKER) (test code                                        



             = 652)                                              

 

             CALCIUM (BEAKER) 8.8 mg/dL    8.5-10.5                  



             (test code = 697)                                        

 

             EGFR (BEAKER) (test 41 mL/min/1.73                           ESTIMA

TERRENCE GFR IS



             code = 1092) sq m                                   NOT AS ACCURATE

 AS



                                                                 CREATININE



                                                                 CLEARANCE IN



                                                                 PREDICTING



                                                                 GLOMERULAR



                                                                 FILTRATION RATE

.



                                                                 ESTIMATED GFR I

S



                                                                 NOT APPLICABLE 

FOR



                                                                 DIALYSIS PATIEN

TS.



HEPATIC FUNCTION [id number] 22:07:00





             Test Item    Value        Reference Range Interpretation Comments

 

             TOTAL PROTEIN (BEAKER) (test code = 7.0 gm/dL    6.0-8.5           

        



             770)                                                

 

             ALBUMIN (BEAKER) (test code = 1145) 4.2 g/dL     3.5-5.0           

        

 

             BILIRUBIN TOTAL (BEAKER) (test code 0.2 mg/dL    0.1-1.2           

        



             = 377)                                              

 

             BILIRUBIN DIRECT (BEAKER) (test 0.1 mg/dL    0.0-0.4               

    



             code = 706)                                         

 

             ALKALINE PHOSPHATASE (BEAKER) (test 87 U/L                   

        



             code = 346)                                         

 

             AST (SGOT) (BEAKER) (test code = 24 U/L       5-40                 

     



             353)                                                

 

             ALT (SGPT) (BEAKER) (test code = 28 U/L       5-50                 

     



             347)                                                



POCT-GLUCOSE [id number] 17:17:00





             Test Item    Value        Reference Range Interpretation Comments

 

             POC-GLUCOSE METER 225 mg/dL           H            : TESTED A

T [facility] [address]



             (BEAKER) (test code                                        [facility],



             = 1538)                                             [address]:



                                                                 /Techni

matilda ID =



                                                                 [id number] for GLEN POSADAS



POCT-GLUCOSE [id number] 11:09:00





             Test Item    Value        Reference Range Interpretation Comments

 

             POC-GLUCOSE METER 352 mg/dL           H            : TESTED A

T [facility] [address]



             (BEAKER) (test code                                        [facility],



             = 1538)                                             [address]:



                                                                 /Techni

matilda ID =



                                                                 [id number] for GLEN POSADAS



BASIC METABOLIC [id number] 10:21:00





             Test Item    Value        Reference Range Interpretation Comments

 

             SODIUM (BEAKER) 132 meq/L    136-145      L            



             (test code = 381)                                        

 

             POTASSIUM (BEAKER) 4.4 meq/L    3.5-5.1                   



             (test code = 379)                                        

 

             CHLORIDE (BEAKER) 103 meq/L                        



             (test code = 382)                                        

 

             CO2 (BEAKER) (test 20 meq/L     22-29        L            



             code = 355)                                         

 

             BLOOD UREA NITROGEN 15 mg/dL     7-21                      



             (BEAKER) (test code                                        



             = 354)                                              

 

             CREATININE (BEAKER) 1.24 mg/dL   0.57-1.25                 



             (test code = 358)                                        

 

             GLUCOSE RANDOM 322 mg/dL           H            



             (BEAKER) (test code                                        



             = 652)                                              

 

             CALCIUM (BEAKER) 8.7 mg/dL    8.4-10.2                  



             (test code = 697)                                        

 

             EGFR (BEAKER) (test 58 mL/min/1.73                           ESTIMA

TERRENCE GFR IS



             code = 1092) sq m                                   NOT AS ACCURATE

 AS



                                                                 CREATININE



                                                                 CLEARANCE IN



                                                                 PREDICTING



                                                                 GLOMERULAR



                                                                 FILTRATION RATE

.



                                                                 ESTIMATED GFR I

S



                                                                 NOT APPLICABLE 

FOR



                                                                 DIALYSIS PATIEN

TS.



 ID - BSCBC (HEMOGRAM ONLY)2020 05:04:00





             Test Item    Value        Reference Range Interpretation Comments

 

             WHITE BLOOD CELL COUNT (BEAKER) 9.6 K/ L     3.5-10.5              

    



             (test code = 775)                                        

 

             RED BLOOD CELL COUNT (BEAKER) 3.65 M/ L    4.63-6.08    L          

  



             (test code = 761)                                        

 

             HEMOGLOBIN (BEAKER) (test code = 10.4 GM/DL   13.7-17.5    L       

     



             410)                                                

 

             HEMATOCRIT (BEAKER) (test code = 32.0 %       40.1-51.0    L       

     



             411)                                                

 

             MEAN CORPUSCULAR VOLUME (BEAKER) 87.7 fL      79.0-92.2            

     



             (test code = 753)                                        

 

             MEAN CORPUSCULAR HEMOGLOBIN 28.5 pg      25.7-32.2                 



             (BEAKER) (test code = 751)                                        

 

             MEAN CORPUSCULAR HEMOGLOBIN CONC 32.5 GM/DL   32.3-36.5            

     



             (BEAKER) (test code = 752)                                        

 

             RED CELL DISTRIBUTION WIDTH 13.0 %       11.6-14.4                 



             (BEAKER) (test code = 412)                                        

 

             PLATELET COUNT (BEAKER) (test 168 K/CU MM  150-450                 

  



             code = 756)                                         

 

             MEAN PLATELET VOLUME (BEAKER) 10.4 fL      9.4-12.4                

  



             (test code = 754)                                        

 

             NUCLEATED RED BLOOD CELLS 0 /100 WBC   0-0                       



             (BEAKER) (test code = 413)                                        



POCT-GLUCOSE [id number] 22:08:00





             Test Item    Value        Reference Range Interpretation Comments

 

             POC-GLUCOSE METER 295 mg/dL           H            : TESTED A

T [facility] [address]



             (BEAKER) (test code =                                        [place],



             1538)                                               [address]:



                                                                 /Techni

matilda ID



                                                                 = [id number] for ZAIRE NORMAN



[id number] 17:12:00





             Test Item    Value        Reference Range Interpretation Comments

 

             MAGNESIUM (BEAKER) (test code = 1.4 mg/dL    1.6-2.6      L        

    



             627)                                                



 ID - [id number] 15:51:00





             Test Item    Value        Reference Range Interpretation Comments

 

             ACTIVATED CLOTTING TIME 263 sec                                Refe

rence Range: 



             (BEAKER) (test code =                                        second

s,



             441)                                                Baseline/TESTED

 AT



                                                                 [facility] [address] [facility] [address]

0



[id number] 15:39:00





             Test Item    Value        Reference Range Interpretation Comments

 

             ACTIVATED CLOTTING TIME 246 sec                                Refe

rence Range: 



             (BEAKER) (test code =                                        second

s,



             441)                                                Baseline/TESTED

 AT



                                                                 [facility] [address] [facility] [address]

0



[id number] 15:16:00





             Test Item    Value        Reference Range Interpretation Comments

 

             ACTIVATED CLOTTING TIME 213 sec                                Refe

rence Range: 



             (BEAKER) (test code =                                        second

s,



             441)                                                Baseline/TESTED

 AT



                                                                 [facility] [address] [facility] [address]

0



PLATELET AGGREGATION: DRUG [id number] 14:48:00





             Test Item    Value        Reference Range Interpretation Comments

 

             ARACHADONIC ACID 13 %         63-89        L            



             RESULT(BEAKER) (test                                        



             code = 2138)                                        

 

             PLATELET AGG DRUG Decreased response to                           



             INTERPRETATION (BEAKER) arachidonic acid                           



             (test code = 2408) suggests aspirin-like                           



                          effect.                                

 

             PLATELET AGG DRUG Decreased aggregation                           



             INTERPRETATION (BEAKER) with ADP which                           



             (test code = 633492) indicates platelet                           



                          dysfunction that may                           



                          be due to medication                           



                          effect, uremia, or                           



                          other platelet                           



                          function disorders.                           



                          Clinical correlation                           



                          is required.                           

 

             [id number] Meredith Reyes, MD                           



             (BEAKER) (test code = (electronic                            



             2621)        signature)                             

 

             PLATELET COUNT  K/CU MM  150-450                   



             (BEAKER) (test code =                                        



             2656)                                               

 

             PLATELET RICH 276 k/cu mm  200-300                   



             PLASMA(BEAKER) (test                                        



             code = 2134)                                        



Platelet function studies by aggregation methodology on samples with platelet 
count &lt;75,000/CU MMare unreliable; platelet function assessment should not be
 based on a single test. ID - [id number] 14:47:00





             Test Item    Value        Reference Range Interpretation Comments

 

             HEMOGLOBIN A1C (BEAKER) (test code = 9.1 %        4.3-6.1      H   

         



             368)                                                



LIPID [id number] 14:44:00





             Test Item    Value        Reference Range Interpretation Comments

 

             TRIGLYCERIDES (BEAKER) (test code = 152 mg/dL                      

        



             540)                                                

 

             CHOLESTEROL (BEAKER) (test code = 115 mg/dL                        

      



             631)                                                

 

             HDL CHOLESTEROL (BEAKER) (test code 26 mg/dL                       

        



             = 976)                                              

 

             LDL CHOLESTEROL CALCULATED (BEAKER) 59 mg/dL                       

        



             (test code = 633)                                        



Triglyceride Reference Range: Low Risk &lt;150 Borderline 150-199 High Risk 200-
499 Very High Risk &gt;=500Cholesterol Reference Range: Low Risk &lt;200 
Borderline 200-239 High Risk &gt;240HDL Cholesterol Reference Range: Low Risk 
&gt;=60 High Risk &lt;40LDL Cholesterol Reference Range: Optimal &lt;100 Near 
Optimal 100-129 Borderline 130-159 High 160-189 Very High &gt;=190  ID -
 BSTROPONIN -97-12 10:57:00





             Test Item    Value        Reference Range Interpretation Comments

 

             TROPONIN I (BEAKER) (test code = 0.07 ng/mL   0.00-0.03    H       

     



             397)                                                








             Test Item    Value        Reference Range Interpretation Comments

 

             B-TYPE NATRIURETIC PEPTIDE (BEAKER) 73 pg/mL     0-100             

        



             (test code = 700)                                        



 ID - MAGDALENA CCOMPREHENSIVE METABOLIC [id number] 10:50:00





             Test Item    Value        Reference Range Interpretation Comments

 

             TOTAL PROTEIN 6.4 gm/dL    6.0-8.3                   



             (BEAKER) (test code =                                        



             770)                                                

 

             ALBUMIN (BEAKER) 4.0 g/dL     3.5-5.0                   



             (test code = 1145)                                        

 

             ALKALINE PHOSPHATASE 80 U/L                           



             (BEAKER) (test code =                                        



             346)                                                

 

             BILIRUBIN TOTAL 0.3 mg/dL    0.2-1.2                   



             (BEAKER) (test code =                                        



             377)                                                

 

             SODIUM (BEAKER) (test 139 meq/L    136-145                   



             code = 381)                                         

 

             POTASSIUM (BEAKER) 4.2 meq/L    3.5-5.1                   



             (test code = 379)                                        

 

             CHLORIDE (BEAKER) 108 meq/L           H            



             (test code = 382)                                        

 

             CO2 (BEAKER) (test 24 meq/L     -                     



             code = 355)                                         

 

             BLOOD UREA NITROGEN 19 mg/dL     7-21                      



             (BEAKER) (test code =                                        



             354)                                                

 

             CREATININE (BEAKER) 1.23 mg/dL   0.57-1.25                 



             (test code = 358)                                        

 

             GLUCOSE RANDOM 158 mg/dL           H            



             (BEAKER) (test code =                                        



             652)                                                

 

             CALCIUM (BEAKER) 8.9 mg/dL    8.4-10.2                  



             (test code = 697)                                        

 

             AST (SGOT) (BEAKER) 17 U/L       5-34                      



             (test code = 353)                                        

 

             ALT (SGPT) (BEAKER) 20 U/L       6-55                      



             (test code = 347)                                        

 

             EGFR (BEAKER) (test 59 mL/min/1.73                           ESTIMA

TERRENCE GFR IS



             code = 1092) sq m                                   NOT AS ACCURATE

 AS



                                                                 CREATININE



                                                                 CLEARANCE IN



                                                                 PREDICTING



                                                                 GLOMERULAR



                                                                 FILTRATION RATE

.



                                                                 ESTIMATED GFR I

S



                                                                 NOT APPLICABLE 

FOR



                                                                 DIALYSIS PATIEN

TS.



 ID - MAGDALENA CPT/[id number] 10:46:00





             Test Item    Value        Reference Range Interpretation Comments

 

             PROTIME (BEAKER) (test code = 13.4 seconds 11.9-14.2               

  



             759)                                                

 

             INR (BEAKER) (test code = 370) 1.1          <=5.9                  

   

 

             PARTIAL THROMBOPLASTIN TIME 36.1 seconds 22.5-36.0    H            



             (BEAKER) (test code = 760)                                        



Effective 2019: PT Reference Range ChangeNew: 11.9-14.2 Previous: 11.7-
14.7RECOMMENDED COUMADIN/WARFARIN INR THERAPY RANGESSTANDARD DOSE: 2.0-3.0 
Includes: PROPHYLAXIS for venous thrombosis, systemic embolization; TREATMENT 
for venous thrombosis and/or pulmonary embolus.HIGH RISK: Target INR is 2.5-3.5 
for patients wiht mechanical heart valves.PROTHROMBIN TIME/[id number] 
10:45:00





             Test Item    Value        Reference Range Interpretation Comments

 

             PROTIME (BEAKER) (test code = 13.4 seconds 11.9-14.2               

  



             759)                                                

 

             INR (BEAKER) (test code = 370) 1.1          <=5.9                  

   



Effective 2019: PT Reference Range ChangeNew: 11.9-14.2 Previous: 11.7-
14.7RECOMMENDED COUMADIN/WARFARIN INR THERAPY RANGESSTANDARD DOSE: 2.0-3.0 
Includes: PROPHYLAXIS for venous thrombosis, systemic embolization; TREATMENT 
for venous thrombosis and/or pulmonary embolus.HIGH RISK: Target INR is 2.5-3.5 
for patients wiht mechanical heart valves.CBC W/PLT COUNT &amp; AUTO 
[id number] 10:31:00





             Test Item    Value        Reference Range Interpretation Comments

 

             WHITE BLOOD CELL COUNT (BEAKER) 8.9 K/ L     3.5-10.5              

    



             (test code = 775)                                        

 

             RED BLOOD CELL COUNT (BEAKER) 3.95 M/ L    4.63-6.08    L          

  



             (test code = 761)                                        

 

             HEMOGLOBIN (BEAKER) (test code = 11.3 GM/DL   13.7-17.5    L       

     



             410)                                                

 

             HEMATOCRIT (BEAKER) (test code = 34.4 %       40.1-51.0    L       

     



             411)                                                

 

             MEAN CORPUSCULAR VOLUME (BEAKER) 87.1 fL      79.0-92.2            

     



             (test code = 753)                                        

 

             MEAN CORPUSCULAR HEMOGLOBIN 28.6 pg      25.7-32.2                 



             (BEAKER) (test code = 751)                                        

 

             MEAN CORPUSCULAR HEMOGLOBIN CONC 32.8 GM/DL   32.3-36.5            

     



             (BEAKER) (test code = 752)                                        

 

             RED CELL DISTRIBUTION WIDTH 13.2 %       11.6-14.4                 



             (BEAKER) (test code = 412)                                        

 

             PLATELET COUNT (BEAKER) (test 183 K/CU MM  150-450                 

  



             code = 756)                                         

 

             MEAN PLATELET VOLUME (BEAKER) 10.0 fL      9.4-12.4                

  



             (test code = 754)                                        

 

             NUCLEATED RED BLOOD CELLS 0 /100 WBC   0-0                       



             (BEAKER) (test code = 413)                                        

 

             NEUTROPHILS RELATIVE PERCENT 67 %                                  

 



             (BEAKER) (test code = 429)                                        

 

             LYMPHOCYTES RELATIVE PERCENT 19 %                                  

 



             (BEAKER) (test code = 430)                                        

 

             MONOCYTES RELATIVE PERCENT 9 %                                    



             (BEAKER) (test code = 431)                                        

 

             EOSINOPHILS RELATIVE PERCENT 5 %                                   

 



             (BEAKER) (test code = 432)                                        

 

             BASOPHILS RELATIVE PERCENT 1 %                                    



             (BEAKER) (test code = 437)                                        

 

             NEUTROPHILS ABSOLUTE COUNT 5.97 K/ L    1.78-5.38    H            



             (BEAKER) (test code = 670)                                        

 

             LYMPHOCYTES ABSOLUTE COUNT 1.69 K/ L    1.32-3.57                 



             (BEAKER) (test code = 414)                                        

 

             MONOCYTES ABSOLUTE COUNT (BEAKER) 0.76 K/ L    0.30-0.82           

      



             (test code = 415)                                        

 

             EOSINOPHILS ABSOLUTE COUNT 0.40 K/ L    0.04-0.54                 



             (BEAKER) (test code = 416)                                        

 

             BASOPHILS ABSOLUTE COUNT (BEAKER) 0.05 K/ L    0.01-0.08           

      



             (test code = 417)                                        

 

             IMMATURE GRANULOCYTES-RELATIVE 1 %          0-1                    

   



             PERCENT (BEAKER) (test code =                                      

  



             2801)                                               



RAPID TROPONIN -63-44 05:50:00





             Test Item    Value        Reference Range Interpretation Comments

 

             RAPID TROPONIN I (BEAKER) (test code < ng/mL      <0.05            

         



             = 1483)                                             



RAD, CHEST, 1 VIEW, NON [id number] 22:32:00Reason for exam:-&gt;CHEST 
PAINShould this be performed at the bedside?-&gt;NoFINAL REPORT PATIENT ID: 
[id number] Chest, 1 view. History: Chest pain Comparison: Plain radiograph the 
chest dated 2018.. Findings: Postsurgical changes of a median sternotomy and
 CABG with fractureof the second most superior sternotomy wire. The 
Cardiomediastinal silhouette and pulmonary vasculature are within normal limits 
for a portable exam. The lungs are clear without evidence of consolidation or 
effusion. The soft tissues and osseous structures are intact. IMPRESSION: No 
acute cardiopulmonary abnormality. Signed: Margy Cox [facility] Verified
 Date/Time: 2020 22:32:48 Electronically signed by: MARGY COX MD on 2020 10:32 PMRAPID TROPONIN  22:18:00





             Test Item    Value        Reference Range Interpretation Comments

 

             RAPID TROPONIN I (BEAKER) (test code < ng/mL      <0.05            

         



             = 1483)                                             



COMPREHENSIVE METABOLIC [id number] 22:17:00





             Test Item    Value        Reference Range Interpretation Comments

 

             TOTAL PROTEIN 6.5 gm/dL    6.0-8.5                   



             (BEAKER) (test code =                                        



             770)                                                

 

             ALBUMIN (BEAKER) 4.0 g/dL     3.5-5.0                   



             (test code = 1145)                                        

 

             ALKALINE PHOSPHATASE 115 U/L                          



             (BEAKER) (test code =                                        



             346)                                                

 

             BILIRUBIN TOTAL 0.3 mg/dL    0.1-1.2                   



             (BEAKER) (test code =                                        



             377)                                                

 

             SODIUM (BEAKER) (test 137 meq/L    135-148                   



             code = 381)                                         

 

             POTASSIUM (BEAKER) 4.4 meq/L    3.6-5.5                   



             (test code = 379)                                        

 

             CHLORIDE (BEAKER) 104 meq/L                        



             (test code = 382)                                        

 

             CO2 (BEAKER) (test 24 meq/L     24-32                     



             code = 355)                                         

 

             BLOOD UREA NITROGEN 21 mg/dL     10-26                     



             (BEAKER) (test code =                                        



             354)                                                

 

             CREATININE (BEAKER) 1.29 mg/dL   0.50-1.20    H            



             (test code = 358)                                        

 

             GLUCOSE RANDOM 365 mg/dL           H            



             (BEAKER) (test code =                                        



             652)                                                

 

             CALCIUM (BEAKER) 8.8 mg/dL    8.5-10.5                  



             (test code = 697)                                        

 

             AST (SGOT) (BEAKER) 23 U/L       5-40                      



             (test code = 353)                                        

 

             ALT (SGPT) (BEAKER) 32 U/L       5-50                      



             (test code = 347)                                        

 

             EGFR (BEAKER) (test 56 mL/min/1.73                           ESTIMA

TERRENCE GFR IS



             code = 1092) sq m                                   NOT AS ACCURATE

 AS



                                                                 CREATININE



                                                                 CLEARANCE IN



                                                                 PREDICTING



                                                                 GLOMERULAR



                                                                 FILTRATION RATE

.



                                                                 ESTIMATED GFR I

S



                                                                 NOT APPLICABLE 

FOR



                                                                 DIALYSIS PATIEN

TS.



CBC W/PLT COUNT &amp; AUTO [id number] 21:59:00





             Test Item    Value        Reference Range Interpretation Comments

 

             WHITE BLOOD CELL COUNT (BEAKER) 8.6 K/ L     4.0-10.0              

    



             (test code = 775)                                        

 

             RED BLOOD CELL COUNT (BEAKER) 4.09 M/ L    4.20-5.80    L          

  



             (test code = 761)                                        

 

             HEMOGLOBIN (BEAKER) (test code = 11.8 GM/DL   13.0-16.8    L       

     



             410)                                                

 

             HEMATOCRIT (BEAKER) (test code = 35.3 %       40.0-50.0    L       

     



             411)                                                

 

             MEAN CORPUSCULAR VOLUME (BEAKER) 86.2 fL      82.0-98.0            

     



             (test code = 753)                                        

 

             MEAN CORPUSCULAR HEMOGLOBIN 28.9 pg      27.0-33.0                 



             (BEAKER) (test code = 751)                                        

 

             MEAN CORPUSCULAR HEMOGLOBIN CONC 33.5 GM/DL   32.0-36.0            

     



             (BEAKER) (test code = 752)                                        

 

             RED CELL DISTRIBUTION WIDTH 14.3 %       10.3-14.2    H            



             (BEAKER) (test code = 412)                                        

 

             PLATELET COUNT (BEAKER) (test 201 K/CU MM  150-430                 

  



             code = 756)                                         

 

             MEAN PLATELET VOLUME (BEAKER) 8.3 fL       6.5-10.5                

  



             (test code = 754)                                        

 

             NEUTROPHILS RELATIVE PERCENT 67 %                                  

 



             (BEAKER) (test code = 429)                                        

 

             LYMPHOCYTES RELATIVE PERCENT 20 %                                  

 



             (BEAKER) (test code = 430)                                        

 

             MONOCYTES RELATIVE PERCENT 8 %                                    



             (BEAKER) (test code = 431)                                        

 

             EOSINOPHILS RELATIVE PERCENT 5 %                                   

 



             (BEAKER) (test code = 432)                                        

 

             BASOPHILS RELATIVE PERCENT 1 %                                    



             (BEAKER) (test code = 437)                                        

 

             NEUTROPHILS ABSOLUTE COUNT 5.74 K/ L    1.80-8.00                 



             (BEAKER) (test code = 670)                                        

 

             LYMPHOCYTES ABSOLUTE COUNT 1.69 K/ L    1.48-4.50                 



             (BEAKER) (test code = 414)                                        

 

             MONOCYTES ABSOLUTE COUNT (BEAKER) 0.68 K/ L    0.00-1.30           

      



             (test code = 415)                                        

 

             EOSINOPHILS ABSOLUTE COUNT 0.42 K/ L    0.00-0.50                 



             (BEAKER) (test code = 416)                                        

 

             BASOPHILS ABSOLUTE COUNT (BEAKER) 0.05 K/ L    0.00-0.20           

      



             (test code = 417)                                        



Microalbumin/Creatinine [Mass Ratio] in Urine2019-11-15 16:34:00





             Test Item    Value        Reference Range Interpretation Comments

 

             microalbumin random urine 32 ug/mL                               



             (test code = microalbumin                                        



             random urine)                                        

 

             creatinine random urine (test 177.9 mg/dL  20.0-370.0              

  



             code = creatinine random                                        



             urine)                                              

 

             microalbumin/creatinine 18 mcg/mg creat                           



             (random urine) ratio                                        



             calculated (test code =                                        



             microalbumin/creatinine                                        



             (random urine) ratio                                        



             calculated)                                         



[facility] W Auto Differential panel - [id number] 02:11:00





             Test Item    Value        Reference Range Interpretation Comments

 

             white blood cell count (test 8.0 thousand/uL 3.8-10.8              

    



             code = white blood cell                                        



             count)                                              

 

             red blood cell count (test 4.45 million/uL 4.20-5.80               

  



             code = red blood cell count)                                       

 

 

             hemoglobin (test code = 12.4 g/dL    13.2-17.1    L            



             hemoglobin)                                         

 

             hematocrit (test code = 39.7 %       38.5-50.0                 



             hematocrit)                                         

 

             MCV (test code = MCV) 89.2 fL      80.0-100.0                

 

             MCH (test code = MCH) 27.9 pg      27.0-33.0                 

 

             MCHC (test code = MCHC) 31.2 g/dL    32.0-36.0    L            

 

             RDW (test code = RDW) 11.9 %       11.0-15.0                 

 

             platelet count (test code = 265 thousand/uL 140-400                

   



             platelet count)                                        

 

             MPV (test code = MPV) 11.0 fL      7.5-12.5                  

 

             absolute neutrophils (test 4696 cells/uL 7066-0948                 



             code = absolute neutrophils)                                       

 

 

             absolute lymphocytes (test 1752 cells/uL 850-3900                  



             code = absolute lymphocytes)                                       

 

 

             absolute monocytes (test code 880 cells/uL 200-950                 

  



             = absolute monocytes)                                        

 

             absolute eosinophils (test 592 cells/uL        H            



             code = absolute eosinophils)                                       

 

 

             absolute basophils (test code 80 cells/uL  0-200                   

  



             = absolute basophils)                                        

 

             neutrophils (test code = 58.7 %                                 



             neutrophils)                                        

 

             lymphocytes (test code = 21.9 %                                 



             lymphocytes)                                        

 

             monocytes (test code = 11.0 %                                 



             monocytes)                                          

 

             eosinophils (test code = 7.4 %                                  



             eosinophils)                                        

 

             basophils (test code = 1.0 %                                  



             basophils)                                          



[facility]
2019 14:42:00





             Test Item    Value        Reference Range Interpretation Comments

 

             ALT (test code = ALT) 22 U/L       0-55                      

 

             AST (test code = AST) 18 U/L       5-34                      

 

             BUN (test code = BUN) 20.3 mg/dL   8.4-25.0                  

 

             alk phos (test code = alk 116 unit/L                       



             phos)                                               

 

             glucose (test code = 424 mg/dL    70-99        H            



             glucose)                                            

 

             albumin (test code = 3.8 g/dL     3.4-5.1                   



             albumin)                                            

 

             creatinine (test code = 1.75 mg/dL   0.72-1.25    H            



             creatinine)                                         

 

             eGFR non-african american 39 mL/min/1.73m2              A          

  



             (test code = eGFR                                        



             non-)                                        

 

             total bilirubin (test code = 0.3 mg/dL    0.2-1.2                  

 



             total bilirubin)                                        

 

             eGFR - african american 47 mL/min/1.73m2              A            



             (test code = eGFR -                                         



             american)                                           

 

             sodium (test code = sodium) 134 mEq/L    135-145      L            

 

             potassium (test code = 5.4 mEq/L    3.5-5.1      H            



             potassium)                                          

 

             chloride (test code = 101 mmol/L                       



             chloride)                                           

 

             total protein (test code = 6.6 g/dL     6.1-8.2                   



             total protein)                                        

 

             calcium (test code = 9.5 mg/dL    9.0-10.2                  



             calcium)                                            

 

             CO2 (test code = CO2) 23.4 mmol/L  20.0-32.0                 

 

             anion gap (test code = anion 10 calc                               

 



             gap)                                                



[facility] or [id number] 13:28:00





             Test Item    Value        Reference Range Interpretation Comments

 

             HDL (test code = HDL) 25 mg/dL                  L            

 

             triglyceride (test code = 455 mg/dL    0-150        H            



             triglyceride)                                        

 

             VLDL (calculated) (test code = 91 mg/dL                            

   



             VLDL (calculated))                                        

 

             cholesterol/HDL ratio (test code 5.3 mg/dL                         

     



             = cholesterol/HDL ratio)                                        

 

             non-HDL cholesterol (calculated) 108 mg/dL    0-160                

     



             (test code = non-HDL cholesterol                                   

     



             (calculated))                                        

 

             cholesterol (test code = 133 mg/dL    0-200                     



             cholesterol)                                        

 

             Cholesterol in LDL [Mass/volume] see comment  0-130        L       

     



             in Serum or Plasma (test code =                                    

    



             2089-1)                                             



[facility] Center[facility] [Units/volume] in Serum or [id number] 
13:28:00





             Test Item    Value        Reference Range Interpretation Comments

 

             TSH (test code = TSH) 3.019 uIU/mL 0.350-4.940               



[facility] or [id number] 13:28:00





             Test Item    Value        Reference Range Interpretation Comments

 

             PSA, total (test code = PSA, 0.29 NG/mL   0.00-4.00                

 



             total)                                              



[facility] CenterHemoglobin A1c/Hemoglobin.total in [id number] 
12:43:00





             Test Item    Value        Reference Range Interpretation Comments

 

             Hemoglobin A1c/Hemoglobin.total in 8.6 %        1.0-5.7      H     

       



             Blood (test code = 4548-4)                                        

 

             average blood glucose (test code = 200 mg/dL                       

       



             average blood glucose)                                        



[facility][U] XRAY KNEE 3 VWS RIGHT [id number] 08:24:00Images 
acquired, not reported on this accession number.UT PhysiciansPLATELET 
AGGREGATION: FUNCTION [id number] 16:37:00





             Test Item    Value        Reference Range Interpretation Comments

 

             WEAK ADP     54 %         60-91        L            



             RESULT(BEAKER) (test                                        



             code = 9506)                                        

 

             PLATELET FUNCTION 50-59% indicates mild                           



             SCREEN INTERP platelet dysfunction                           



             (BEAKER) (test code =                                        



             2173)                                               

 

             [id number] Shay Neal MD                           



             (BEAKER) (test code = (electronic signature)                       

    



             3969)                                               

 

             PLATELET COUNT  K/CU MM  150-450                   



             (BEAKER) (test code =                                        



             7189)                                               



[id number] 05:57:00





             Test Item    Value        Reference Range Interpretation Comments

 

             MAGNESIUM (BEAKER) (test code = 1.6 mg/dL    1.6-2.6               

    



             627)                                                



BASIC METABOLIC [id number] 05:57:00





             Test Item    Value        Reference Range Interpretation Comments

 

             SODIUM (BEAKER) 137 meq/L    136-145                   



             (test code = 381)                                        

 

             POTASSIUM (BEAKER) 4.3 meq/L    3.5-5.1                   



             (test code = 379)                                        

 

             CHLORIDE (BEAKER) 107 meq/L                        



             (test code = 382)                                        

 

             CO2 (BEAKER) (test 21 meq/L     22-29        L            



             code = 355)                                         

 

             BLOOD UREA NITROGEN 13 mg/dL     7-21                      



             (BEAKER) (test code                                        



             = 354)                                              

 

             CREATININE (BEAKER) 0.92 mg/dL   0.57-1.25                 



             (test code = 358)                                        

 

             GLUCOSE RANDOM 183 mg/dL           H            



             (BEAKER) (test code                                        



             = 652)                                              

 

             CALCIUM (BEAKER) 9.3 mg/dL    8.4-10.2                  



             (test code = 697)                                        

 

             EGFR (BEAKER) (test 83 mL/min/1.73                           ESTIMA

TERRENCE GFR IS



             code = 1092) sq m                                   NOT AS ACCURATE

 AS



                                                                 CREATININE



                                                                 CLEARANCE IN



                                                                 PREDICTING



                                                                 GLOMERULAR



                                                                 FILTRATION RATE

.



                                                                 ESTIMATED GFR I

S



                                                                 NOT APPLICABLE 

FOR



                                                                 DIALYSIS PATIEN

TS.



CBC W/PLT COUNT &amp; AUTO [id number] 05:43:00





             Test Item    Value        Reference Range Interpretation Comments

 

             WHITE BLOOD CELL COUNT (BEAKER) 9.9 K/ L     3.5-10.5              

    



             (test code = 775)                                        

 

             RED BLOOD CELL COUNT (BEAKER) 4.18 M/ L    4.63-6.08    L          

  



             (test code = 761)                                        

 

             HEMOGLOBIN (BEAKER) (test code = 11.5 GM/DL   13.7-17.5    L       

     



             410)                                                

 

             HEMATOCRIT (BEAKER) (test code = 34.9 %       40.1-51.0    L       

     



             411)                                                

 

             MEAN CORPUSCULAR VOLUME (BEAKER) 83.5 fL      79.0-92.2            

     



             (test code = 753)                                        

 

             MEAN CORPUSCULAR HEMOGLOBIN 27.5 pg      25.7-32.2                 



             (BEAKER) (test code = 751)                                        

 

             MEAN CORPUSCULAR HEMOGLOBIN CONC 33.0 GM/DL   32.3-36.5            

     



             (BEAKER) (test code = 752)                                        

 

             RED CELL DISTRIBUTION WIDTH 12.6 %       11.6-14.4                 



             (BEAKER) (test code = 412)                                        

 

             PLATELET COUNT (BEAKER) (test 169 K/CU MM  150-450                 

  



             code = 756)                                         

 

             MEAN PLATELET VOLUME (BEAKER) 10.7 fL      9.4-12.4                

  



             (test code = 754)                                        

 

             NUCLEATED RED BLOOD CELLS 0 /100 WBC   0-0                       



             (BEAKER) (test code = 413)                                        

 

             NEUTROPHILS RELATIVE PERCENT 74 %                                  

 



             (BEAKER) (test code = 429)                                        

 

             LYMPHOCYTES RELATIVE PERCENT 14 %                                  

 



             (BEAKER) (test code = 430)                                        

 

             MONOCYTES RELATIVE PERCENT 8 %                                    



             (BEAKER) (test code = 431)                                        

 

             EOSINOPHILS RELATIVE PERCENT 4 %                                   

 



             (BEAKER) (test code = 432)                                        

 

             BASOPHILS RELATIVE PERCENT 1 %                                    



             (BEAKER) (test code = 437)                                        

 

             NEUTROPHILS ABSOLUTE COUNT 7.25 K/ L    1.78-5.38    H            



             (BEAKER) (test code = 670)                                        

 

             LYMPHOCYTES ABSOLUTE COUNT 1.35 K/ L    1.32-3.57                 



             (BEAKER) (test code = 414)                                        

 

             MONOCYTES ABSOLUTE COUNT (BEAKER) 0.74 K/ L    0.30-0.82           

      



             (test code = 415)                                        

 

             EOSINOPHILS ABSOLUTE COUNT 0.39 K/ L    0.04-0.54                 



             (BEAKER) (test code = 416)                                        

 

             BASOPHILS ABSOLUTE COUNT (BEAKER) 0.07 K/ L    0.01-0.08           

      



             (test code = 417)                                        

 

             IMMATURE GRANULOCYTES-RELATIVE 1 %          0-1                    

   



             PERCENT (BEAKER) (test code =                                      

  



             2801)                                               



POCT-GLUCOSE [id number] 23:00:00





             Test Item    Value        Reference Range Interpretation Comments

 

             POC-GLUCOSE METER 159 mg/dL           H            TESTED AT 

[facility] [address]



             [facility] (test code [facility]                                        CINTHYA HIGGINBOTHAM



             1538)                                               [address]



POCT-GLUCOSE [id number] 14:18:00





             Test Item    Value        Reference Range Interpretation Comments

 

             POC-GLUCOSE METER 178 mg/dL           H            TESTED AT 

[place] [id number]



             [facility] (test code =                                        CINTHYA ALDANA [facility]



             1538)                                               [address]



[id number] 12:13:00





             Test Item    Value        Reference Range Interpretation Comments

 

             ACTIVATED CLOTTING TIME 235 sec                                TEST

ED AT [place] [address]



             ([facility]) (test code =                                        CINTHYA ALDANA [facility]



             441)                                                [address]



[id number] 12:02:00





             Test Item    Value        Reference Range Interpretation Comments

 

             ACTIVATED CLOTTING TIME 186 sec                                TEST

ED AT [place] [address]



             ([facility]) (test code =                                        CINTHYA ALDANA [facility]



             441)                                                [address]



TROPONIN -17-47 09:02:00





             Test Item    Value        Reference Range Interpretation Comments

 

             TROPONIN I ([facility]) (test code = 0.01 ng/mL   0.00-0.03            

     



             397)                                                








             Test Item    Value        Reference Range Interpretation Comments

 

             POC-GLUCOSE METER 186 mg/dL           H            TESTED AT 

[place] [address]



             ([facility]) (test code =                                        CHELONE

JOVAN [facility]



             1538)                                               [address]



TROPONIN -72-49 05:06:00





             Test Item    Value        Reference Range Interpretation Comments

 

             TROPONIN I ([facility]) (test code = 0.01 ng/mL   0.00-0.03            

     



             397)                                                








             Test Item    Value        Reference Range Interpretation Comments

 

             MAGNESIUM ([facility]) (test code = 1.7 mg/dL    1.6-2.6               

    



             627)                                                



BASIC METABOLIC [id number] 05:03:00





             Test Item    Value        Reference Range Interpretation Comments

 

             SODIUM (BEAKER) 137 meq/L    136-145                   



             (test code = 381)                                        

 

             POTASSIUM (BEAKER) 3.8 meq/L    3.5-5.1                   



             (test code = 379)                                        

 

             CHLORIDE (BEAKER) 107 meq/L                        



             (test code = 382)                                        

 

             CO2 (BEAKER) (test 21 meq/L     22-29        L            



             code = 355)                                         

 

             BLOOD UREA NITROGEN 21 mg/dL     7-21                      



             (BEAKER) (test code                                        



             = 354)                                              

 

             CREATININE (BEAKER) 0.99 mg/dL   0.57-1.25                 



             (test code = 358)                                        

 

             GLUCOSE RANDOM 124 mg/dL           H            



             (BEAKER) (test code                                        



             = 652)                                              

 

             CALCIUM (BEAKER) 9.0 mg/dL    8.4-10.2                  



             (test code = 697)                                        

 

             EGFR (BEAKER) (test 76 mL/min/1.73                           ESTIMA

TERRENCE GFR IS



             code = 1092) sq m                                   NOT AS ACCURATE

 AS



                                                                 CREATININE



                                                                 CLEARANCE IN



                                                                 PREDICTING



                                                                 GLOMERULAR



                                                                 FILTRATION RATE

.



                                                                 ESTIMATED GFR I

S



                                                                 NOT APPLICABLE 

FOR



                                                                 DIALYSIS PATIEN

TS.



[id number] 04:17:00





             Test Item    Value        Reference Range Interpretation Comments

 

             PARTIAL THROMBOPLASTIN TIME 41.7 seconds 22.5-36.0    H            



             (BEAKER) (test code = 760)                                        



PROTHROMBIN [id number] 04:16:00





             Test Item    Value        Reference Range Interpretation Comments

 

             PROTIME (BEAKER) (test code = 14.7 seconds 11.7-14.7               

  



             759)                                                

 

             INR (BEAKER) (test code = 370) 1.1          <=5.9                  

   



RECOMMENDED COUMADIN/WARFARIN INR THERAPY RANGESSTANDARD DOSE: 2.0 - 3.0 
Includes: PROPHYLAXIS for venous thrombosis, systemic embolization; TREATMENT 
for venous thrombosis and/or pulmonary embolus.HIGH RISK: Target INR is 2.5-3.5 
for patients with mechanical heart valves.CBC W/PLT COUNT &amp; AUTO 
[id number] 04:11:00





             Test Item    Value        Reference Range Interpretation Comments

 

             WHITE BLOOD CELL COUNT 9.1 K/ L     3.5-10.5                  



             (BEAKER) (test code =                                        



             775)                                                

 

             RED BLOOD CELL COUNT 3.62 M/ L    4.63-6.08    L            



             (BEAKER) (test code =                                        



             761)                                                

 

             HEMOGLOBIN (BEAKER) 9.9 GM/DL    13.7-17.5    L            



             (test code = 410)                                        

 

             HEMATOCRIT (BEAKER) 30.6 %       40.1-51.0    L            



             (test code = 411)                                        

 

             MEAN CORPUSCULAR 84.5 fL      79.0-92.2                 



             VOLUME (BEAKER) (test                                        



             code = 753)                                         

 

             MEAN CORPUSCULAR 27.3 pg      25.7-32.2                 



             HEMOGLOBIN (BEAKER)                                        



             (test code = 751)                                        

 

             MEAN CORPUSCULAR 32.4 GM/DL   32.3-36.5                 



             HEMOGLOBIN CONC                                        



             (BEAKER) (test code =                                        



             752)                                                

 

             RED CELL DISTRIBUTION 12.6 %       11.6-14.4                 



             WIDTH (BEAKER) (test                                        



             code = 412)                                         

 

             PLATELET COUNT 159 K/CU MM  150-450                   Discordant PL

T



             (BEAKER) (test code =                                        result

s compared to



             756)                                                previous result

s;



                                                                 clinical correl

ation



                                                                 required.

 

             MEAN PLATELET VOLUME 10.6 fL      9.4-12.4                  



             (BEAKER) (test code =                                        



             754)                                                

 

             NUCLEATED RED BLOOD 0 /100 WBC   0-0                       



             CELLS (BEAKER) (test                                        



             code = 413)                                         

 

             NEUTROPHILS RELATIVE 61 %                                   



             PERCENT (BEAKER) (test                                        



             code = 429)                                         

 

             LYMPHOCYTES RELATIVE 26 %                                   



             PERCENT (BEAKER) (test                                        



             code = 430)                                         

 

             MONOCYTES RELATIVE 8 %                                    



             PERCENT (BEAKER) (test                                        



             code = 431)                                         

 

             EOSINOPHILS RELATIVE 5 %                                    



             PERCENT (BEAKER) (test                                        



             code = 432)                                         

 

             BASOPHILS RELATIVE 1 %                                    



             PERCENT (BEAKER) (test                                        



             code = 437)                                         

 

             NEUTROPHILS ABSOLUTE 5.50 K/ L    1.78-5.38    H            



             COUNT (BEAKER) (test                                        



             code = 670)                                         

 

             LYMPHOCYTES ABSOLUTE 2.33 K/ L    1.32-3.57                 



             COUNT (BEAKER) (test                                        



             code = 414)                                         

 

             MONOCYTES ABSOLUTE 0.68 K/ L    0.30-0.82                 



             COUNT (BEAKER) (test                                        



             code = 415)                                         

 

             EOSINOPHILS ABSOLUTE 0.43 K/ L    0.04-0.54                 



             COUNT (BEAKER) (test                                        



             code = 416)                                         

 

             BASOPHILS ABSOLUTE 0.06 K/ L    0.01-0.08                 



             COUNT (BEAKER) (test                                        



             code = 417)                                         

 

             IMMATURE     1 %          0-1                       



             GRANULOCYTES-RELATIVE                                        



             PERCENT (BEAKER) (test                                        



             code = 2801)                                        



RAD, CHEST, 2 [id number] 20:28:00Reason for exam:-&gt;CHEST PAINFINAL 
REPORT PATIENT ID: [id number] History: Chest pain. FINDINGS: Chest, two views: PA 
and lateral views of the chest show a normal appearing heart and mediastinum. 
Multiple sternal wires, some of whichare broken, are present and indicate prior 
median sternotomy. Lungs are clear, free of edema, focal consolidation or 
visible effusions. No pneumothorax. Bones are unremarkable. IMPRESSION: 1. 
Negative for acute cardiopulmonary disease. Signed: Juliette Sow 
Verified Date/Time: 2018 20:28:31 Reading Location: [facility] Diagnostic 
Imaging Reading Room - [place] [id number] Electronically signed by:JULIETTE SOW M.D. on 2018 08:28 [id number] 20:23:00





             Test Item    Value        Reference Range Interpretation Comments

 

             MAGNESIUM (BEAKER) (test code = 1.0 mg/dL    1.5-3.0      LL       

    



             627)                                                



RAPID [id number] 20:18:00





             Test Item    Value        Reference Range Interpretation Comments

 

             RAPID CKMB (BEAKER) (test code = 1.5 ng/mL    0.0-4.3              

     



             1482)                                               



RAPID [id number] 20:18:00





             Test Item    Value        Reference Range Interpretation Comments

 

             RAPID MYOGLOBIN (BEAKER) (test code 94 ng/mL     <107              

        



             = 2237)                                             



RAPID TROPONIN -[id number] 20:17:00





             Test Item    Value        Reference Range Interpretation Comments

 

             RAPID TROPONIN I (BEAKER) (test code < ng/mL      <0.05            

         



             = 1483)                                             



B-TYPE NATRIURETIC FACTOR (BNP)2018 20:16:00





             Test Item    Value        Reference Range Interpretation Comments

 

             B-TYPE NATRIURETIC PEPTIDE (BEAKER) 48 pg/mL     0-100             

        



             (test code = 700)                                        



PROTHROMBIN [id number] 20:12:00





             Test Item    Value        Reference Range Interpretation Comments

 

             PROTIME (BEAKER) (test code = 10.5 seconds 9.8-12.0                

  



             759)                                                

 

             INR (BEAKER) (test code = 370) 1.0          <=5.9                  

   



RECOMMENDED COUMADIN/WARFARIN INR THERAPY RANGESSTANDARD DOSE: 2.0 - 3.0 
Includes: PROPHYLAXIS for venous thrombosis, systemic embolization; TREATMENT 
for venous thrombosis and/or pulmonary embolus.HIGH RISK: Target INR is 2.5-3.5 
for patients with mechanical heart valves.BASIC METABOLIC [id number] 
20:12:00





             Test Item    Value        Reference Range Interpretation Comments

 

             SODIUM (BEAKER) 142 meq/L    135-148                   



             (test code = 381)                                        

 

             POTASSIUM (BEAKER) 4.6 meq/L    3.6-5.5                   



             (test code = 379)                                        

 

             CHLORIDE (BEAKER) 106 meq/L                        



             (test code = 382)                                        

 

             CO2 (BEAKER) (test 22 meq/L     24-32        L            



             code = 355)                                         

 

             BLOOD UREA NITROGEN 22 mg/dL     10-26                     



             (BEAKER) (test code                                        



             = 354)                                              

 

             CREATININE (BEAKER) 1.19 mg/dL   0.50-1.20                 



             (test code = 358)                                        

 

             GLUCOSE RANDOM 194 mg/dL           H            



             (BEAKER) (test code                                        



             = 652)                                              

 

             CALCIUM (BEAKER) 9.6 mg/dL    8.5-10.5                  



             (test code = 697)                                        

 

             EGFR (BEAKER) (test  mL/min/1.73                           INSUFFIC

IENT CLINICAL



             code = 1092) sq m                                   DATA TO CALCULA

TE



                                                                 ESTIMATED GFR.



CBC W/PLT COUNT &amp; AUTO [id number] 20:04:00





             Test Item    Value        Reference Range Interpretation Comments

 

             WHITE BLOOD CELL COUNT 11.3 10e3/i? L 4.0-10.0     H            



             (BEAKER) (test code = 775)                                        

 

             RED BLOOD CELL COUNT (BEAKER) 4.14 10e6/i? L 4.20-5.80    L        

    



             (test code = 761)                                        

 

             HEMOGLOBIN (BEAKER) (test code 11.6 g/dL    13.0-16.8    L         

   



             = 410)                                              

 

             HEMATOCRIT (BEAKER) (test code 35.1 %       40.0-50.0    L         

   



             = 411)                                              

 

             MEAN CORPUSCULAR VOLUME 84.9 fL      82.0-98.0                 



             (BEAKER) (test code = 753)                                        

 

             MEAN CORPUSCULAR HEMOGLOBIN 27.9 pg      27.0-33.0                 



             (BEAKER) (test code = 751)                                        

 

             MEAN CORPUSCULAR HEMOGLOBIN 32.9 g/dL    32.0-36.0                 



             CONC (BEAKER) (test code =                                        



             752)                                                

 

             RED CELL DISTRIBUTION WIDTH 12.3 %       10.3-14.2                 



             (BEAKER) (test code = 412)                                        

 

             PLATELET COUNT (BEAKER) (test 216 10e3/i? L 150-430                

   



             code = 756)                                         

 

             MEAN PLATELET VOLUME (BEAKER) 8.1 fL       6.5-10.5                

  



             (test code = 754)                                        

 

             NEUTROPHILS RELATIVE PERCENT 67 %                                  

 



             (BEAKER) (test code = 429)                                        

 

             LYMPHOCYTES RELATIVE PERCENT 19 %                                  

 



             (BEAKER) (test code = 430)                                        

 

             MONOCYTES RELATIVE PERCENT 9 %                                    



             (BEAKER) (test code = 431)                                        

 

             EOSINOPHILS RELATIVE PERCENT 5 %                                   

 



             (BEAKER) (test code = 432)                                        

 

             BASOPHILS RELATIVE PERCENT 1 %                                    



             (BEAKER) (test code = 437)                                        

 

             NEUTROPHILS ABSOLUTE COUNT 7.54 10e3/i? L 1.80-8.00                

 



             (BEAKER) (test code = 670)                                        

 

             LYMPHOCYTES ABSOLUTE COUNT 2.16 10e3/i? L 1.48-4.50                

 



             (BEAKER) (test code = 414)                                        

 

             MONOCYTES ABSOLUTE COUNT 0.96 10e3/i? L 0.00-1.30                 



             (BEAKER) (test code = 415)                                        

 

             EOSINOPHILS ABSOLUTE COUNT 0.57 10e3/i? L 0.00-0.50    H           

 



             (BEAKER) (test code = 416)                                        

 

             BASOPHILS ABSOLUTE COUNT 0.08 10e3/i? L 0.00-0.20                 



             (BEAKER) (test code = 417)

## 2022-08-20 NOTE — EDPHYS
Physician Documentation                                                                           

 [facility]                                                                 

Name: Manchester Prince                                                                               

Age: 67 yrs                                                                                       

Sex: Male                                                                                         

: 1955                                                                                   

MRN: [id number]                                                                                   

Arrival Date: 2022                                                                          

Time: 12:33                                                                                       

Account#: [id number]                                                                            

Bed 8                                                                                             

Private MD:                                                                                       

ED Physician Flakita Rabago                                                                    

HPI:                                                                                              

                                                                                             

12:56 This 67 yrs old Male presents to ER via EMS with complaints of Blood Pressure Problem.  sd2 

12:56 67-year-old male with extensive past medical history including CAD, diabetes,           sd2 

      hypertension and recent abdominal mass with placement of bilateral nephrostomy tubes        

      presents via EMS with chief complaint of hypotension and near syncopal episode. Patient     

      reports that he had an episode of feeling as if he was going to pass out and that           

      normally when this happens, he can lean over and take some deep breaths and will past       

      but this time it did not. They report his blood pressure was in the 70s upon their          

      initial arrival and he was given 800 mL of fluids prior to arrival with improvement of      

      his blood pressure. The patient reports he was recently discharged from [facility]     

      approximately 2 weeks ago when they discovered the abdominal mass with kidney failure       

      and he had placement of the nephrostomy tubes. He reports his blood pressure was            

      significantly elevated when he first went into the hospital which they treated and did      

      adjust some of his blood pressure medications at that time. He has taken all of his         

      blood pressure medications today and does not check his blood pressure at home              

      regularly. He reports his symptoms worsen when he stands up. EMS also reports his blood     

      pressure worsened when he stood up at home..                                                

                                                                                                  

Historical:                                                                                       

- Allergies:                                                                                      

12:42 No Known Allergies;                                                                     jl7 

- PMHx:                                                                                           

12:42 Myocardial infarction; CABG; Cardic stent x19; Diabetes mellitus; GERD; Gout;           jl7 

- PSHx:                                                                                           

12:42 Nephrostomy tube;                                                                       jl7 

                                                                                                  

- Immunization history:: Adult Immunizations up to date.                                          

- Social history:: Smoking status: Patient denies any tobacco usage or history of.                

                                                                                                  

                                                                                                  

ROS:                                                                                              

12:57 Constitutional: Negative for fever, chills, and weight loss, Eyes: Negative for injury, sd2 

      pain, redness, and discharge, Cardiovascular: Negative for chest pain, palpitations,        

      and edema. Positive for near syncope and hypotension. Respiratory: Negative for             

      shortness of breath, cough, wheezing. Abdomen/GI: Negative for abdominal pain, nausea,      

      vomiting, diarrhea. MS/Extremity: Negative for injury and deformity, Skin: Negative for     

      injury, rash, and discoloration, Neuro: Negative for headache, numbness and tingling.       

                                                                                                  

Exam:                                                                                             

12:57 Constitutional:  This is a well developed, well nourished patient who is awake, alert,  sd2 

      and in no acute distress. Head/Face:  Normocephalic, atraumatic. Eyes:  EOMI, normal        

      conjunctiva bilaterally Chest/axilla:  Normal chest wall appearance and motion.             

      Nontender with no deformity.   Cardiovascular:  Regular rate and rhythm with a normal       

      S1 and S2.  No gallops, murmurs, or rubs.  2+ distal pulses. Respiratory:  Lungs have       

      equal breath sounds bilaterally, clear to auscultation and percussion.  No rales,           

      rhonchi or wheezes noted.  No increased work of breathing, no retractions or nasal          

      flaring. Abdomen/GI:  Soft, non-tender, with normal bowel sounds. No guarding or            

      rebound.  No evidence of tenderness throughout. Bilateral nephrostomy tubes in place.       

      Skin:  Warm, dry with normal turgor.  Normal color with no rashes, no lesions, and no       

      evidence of cellulitis. MS/ Extremity:  Pulses equal, no cyanosis.  Neurovascular           

      intact.  Full, normal range of motion. Ambulatory without difficulty.                       

12:57 ECG was reviewed by the Attending Physician. NSR, rate 64, no STEMI criteria, T wave        

      inversions noted in I and aVL                                                               

                                                                                                  

Vital Signs:                                                                                      

12:39 BP 98 / 58; Pulse 67; Resp 15; Temp 97.1; Pulse Ox 97% ;                                jl7 

13:00  / 53; Pulse 63; Resp 15; Pulse Ox 98% ;                                          jl7 

14:00  / 55; Pulse 60; Resp 15; Pulse Ox 99% ;                                          jl7 

14:30  / 55 Supine; Pulse 61;                                                           jl7 

14:31  / 52 Sitting; Pulse 60;                                                          jl7 

14:32  / 53 Standing; Pulse 63;                                                         jl7 

15:31  / 59; Pulse 59; Resp 15; Pulse Ox 97% ;                                          jl7 

17:00  / 54; Pulse 64; Resp 15; Pulse Ox 99% ;                                          hb  

                                                                                                  

MDM:                                                                                              

12:45 Patient medically screened.                                                             sd2 

12:57 Differential Diagnosis Differential diagnosis includes but is not limited to:           sd2 

      Vasovagal, cardiogenic, arrhythmia, anemia, electrolyte abnormality, ACS, orthostasis,      

      dehydration among others. Data reviewed: vital signs, nurses notes, EKG.                    

17:43 Data reviewed: lab test result(s), radiologic studies. Counseling: I had a detailed     sd2 

      discussion with the patient and/or guardian regarding: the historical points, exam          

      findings, and any diagnostic results supporting the discharge/admit diagnosis, lab          

      results, radiology results, the need for outpatient follow up, to return to the             

      emergency department if symptoms worsen or persist or if there are any questions or         

      concerns that arise at home. Medical screen evaluation completed. [facility] emergency          

      medical condition absent. ED course: Labs and imaging reviewed. Labs with elevation of      

      creatinine but improving from prior levels by patient's report. BNP is elevated but         

      with recent hypotension and no SOB, I do not see an indication to treat this further at     

      this time. Delta trop is neg. EKG with no new ischemic changes. CXR with no acute           

      process. UA not consistent with infection. Doubt sepsis or bacterial infection as           

      procal is not consistent with this. Pt feeling much improved. Orthostatics negative. Pt     

      comfortable with plan for discharge and outpatient follow up with his PCP and               

      specialists this upcoming week as scheduled. Verbalizes understanding of discharge plan     

      and strict return precautions and will monitor blood pressure closely at home daily         

      prior to taking his BP medications and discuss his medications with his doctors..           

                                                                                                  

                                                                                             

13:07 Order name: Basic Metabolic Panel; Complete Time: 14:45                                 sd2 

                                                                                             

13:07 Order name: CBC with Diff; Complete Time: 13:56                                         sd2 

                                                                                             

13:07 Order name: LFT's; Complete Time: 14:45                                                 sd2 

                                                                                             

13:07 Order name: Magnesium; Complete Time: 14:45                                             sd2 

                                                                                             

13:07 Order name: NT PRO-BNP; Complete Time: 14:45                                            sd2 

                                                                                             

13:07 Order name: PT-INR; Complete Time: 13:56                                                sd2 

                                                                                             

13:07 Order name: Troponin HS; Complete Time: 14:45                                           sd2 

                                                                                             

13:07 Order name: XRAY Chest (1 view); Complete Time: 13:56                                   sd2 

                                                                                             

13:07 Order name: EKG; Complete Time: 13:08                                                   sd2 

                                                                                             

13:07 Order name: Procalcitonin; Complete Time: 15:16                                         sd2 

                                                                                             

13:07 Order name: Lactate; Complete Time: 13:56                                               sd2 

                                                                                             

13:07 Order name: Urine Microscopic Only; Complete Time: 14:45                                sd2 

                                                                                             

13:25 Order name: Urine Dipstick-Ancillary; Complete Time: 13:56                              EDMS

                                                                                             

15:25 Order name: Troponin High Sensitivity: Repeat to be sent at 1636; Complete Time: 17:38  sd2 

                                                                                             

13:07 Order name: Cardiac monitoring; Complete Time: 13:38                                    sd2 

                                                                                             

13:07 Order name: EKG - Nurse/Tech; Complete Time: 13:38                                      sd2 

                                                                                             

13:07 Order name: IV Saline Lock; Complete Time: 13:38                                        sd2 

                                                                                             

13:07 Order name: Labs collected and sent; Complete Time: 13:38                               sd2 

                                                                                             

13:07 Order name: O2 Per Protocol; Complete Time: 13:38                                       sd2 

                                                                                             

13:07 Order name: O2 Sat Monitoring; Complete Time: 15:28                                     sd2 

                                                                                             

13:07 Order name: Orthostatics; Complete Time: 14:41                                          sd2 

                                                                                             

13:07 Order name: Urine Dipstick-Ancillary (obtain specimen); Complete Time: 13:38            sd2 

                                                                                                  

Administered Medications:                                                                         

No medications were administered                                                                  

                                                                                                  

                                                                                                  

Disposition Summary:                                                                              

22 17:49                                                                                    

Discharge Ordered                                                                                 

      Location: Home                                                                          sd2 

      Problem: an acute exacerbation                                                          sd2 

      Symptoms: have improved                                                                 sd2 

      Condition: Stable                                                                       sd2 

      Diagnosis                                                                                   

        - Transient hypotension                                                               sd2 

        - Elevated creatinine                                                                 sd2 

        - Mild hyperkalemia                                                                   sd2 

        - Near Syncope                                                                        sd2 

      Followup:                                                                               sd2 

        - With: Private Physician                                                                  

        - When: 2 - 3 days                                                                         

        - Reason: Recheck today's complaints, Continuance of care, Re-evaluation by your           

      physician                                                                                   

      Followup:                                                                               sd2 

        - With: Emergency Department                                                               

        - When: As needed                                                                          

        - Reason:                                                                                  

      Discharge Instructions:                                                                     

        - Discharge Summary Sheet                                                             sd2 

        - Hyperkalemia                                                                        sd2 

        - Hypotension                                                                         sd2 

        - Near-Syncope                                                                        sd2 

      Forms:                                                                                      

        - Medication Reconciliation Form                                                      sd2 

        - Thank You Letter                                                                    sd2 

        - Antibiotic Education                                                                sd2 

        - Prescription Opioid Use                                                             sd2 

Signatures:                                                                                       

Dispatcher [employer]                           EDMS                                                 

Marian Mijares RN RN hb Leal, Jahala, RN RN jl7                                                  

Flakita Rabago MD MD   sd2                                                  

                                                                                                  

Corrections: (The following items were deleted from the chart)                                    

12:44 12:42 PMHx: Hypertensive disorder; jl7                                                  jl7 

                                                                                                  

**************************************************************************************************

## 2022-08-20 NOTE — ER
Nurse's Notes                                                                                     

 [facility]                                                                 

Name: Slidell Prince                                                                               

Age: 67 yrs                                                                                       

Sex: Male                                                                                         

: 1955                                                                                   

MRN: [id number]                                                                                   

Arrival Date: 2022                                                                          

Time: 12:33                                                                                       

Account#: [id number]                                                                            

Bed 8                                                                                             

Private MD:                                                                                       

Diagnosis: Transient hypotension;Elevated creatinine;Mild hyperkalemia;Near Syncope               

                                                                                                  

Presentation:                                                                                     

                                                                                             

12:39 Chief complaint: EMS states: Toned out for low blood pressure, 72/40 on EMS arrival,    jl7 

      800 mL NS given in route to ED. Pt recently discharged from [facility], has              

      abdominal mass and bilateral nephrostomy tunes. Coronavirus screen: At this time, the       

      client does not indicate any symptoms associated with coronavirus-19. Ebola Screen: No      

      symptoms or risks identified at this time. Initial Sepsis Screen: Does the patient meet     

      any 2 criteria? No. Patient's initial sepsis screen is negative. Does the patient have      

      a suspected source of infection? No. Patient's initial sepsis screen is negative. Risk      

      Assessment: Do you want to hurt yourself or someone else? Patient reports no desire to      

      harm self or others. Onset of symptoms was 2022. Care prior to arrival:          

      Medication(s) given: Normal saline infusion, 800 mL IV initiated. 18 GA, in the right       

      antecubital area.                                                                           

12:39 Method Of Arrival: EMS: [facility]                                                     jl7 

12:39 Acuity: ELÍAS 2                                                                           jl7 

                                                                                                  

Triage Assessment:                                                                                

12:42 General: Appears in no apparent distress. uncomfortable, Behavior is calm, cooperative, jl7 

      appropriate for age. Pain: Denies pain. Neuro: Level of Consciousness is awake, alert,      

      obeys commands, Oriented to person, place, time, situation. Cardiovascular: Patient's       

      skin is warm and dry. Respiratory: Airway is patent Respiratory effort is even,             

      unlabored, Respiratory pattern is regular, symmetrical. Derm: Skin is pink, warm \T\ dry.   

                                                                                                  

Historical:                                                                                       

- Allergies:                                                                                      

12:42 No Known Allergies;                                                                     jl7 

- PMHx:                                                                                           

12:42 Myocardial infarction; CABG; Cardic stent x19; Diabetes mellitus; GERD; Gout;           jl7 

- PSHx:                                                                                           

12:42 Nephrostomy tube;                                                                       jl7 

                                                                                                  

- Immunization history:: Adult Immunizations up to date.                                          

- Social history:: Smoking status: Patient denies any tobacco usage or history of.                

                                                                                                  

                                                                                                  

Screenin:39 Abuse screen: Denies threats or abuse. Denies injuries from another. Nutritional        hb  

      screening: No deficits noted. Tuberculosis screening: No symptoms or risk factors           

      identified. Fall Risk Total Sow Fall Scale indicates Low Risk Score (25-44 pts). Fall     

      prevention measures have been instituted. Side Rails Up X 2 Frequent Obs/Assesments         

      occuring Family Present and informed to notify staff if they need to leave bedside As       

      available Patient and Family Educated on Fall Prevention Program and strategies.            

                                                                                                  

Assessment:                                                                                       

14:44 Reassessment: Patient appears in no apparent distress at this time. Patient and/or      hb  

      family updated on plan of care and expected duration. Pain level reassessed. Patient is     

      alert, oriented x 3, equal unlabored respirations, skin warm/dry/pink.                      

15:46 Reassessment: Patient appears in no apparent distress at this time. Patient and/or      hb  

      family updated on plan of care and expected duration. Pain level reassessed. Patient is     

      alert, oriented x 3, equal unlabored respirations, skin warm/dry/pink.                      

17:00 Reassessment: Patient appears in no apparent distress at this time. Patient and/or      jl7 

      family updated on plan of care and expected duration. Pain level reassessed. Patient is     

      alert, oriented x 3, equal unlabored respirations, skin warm/dry/pink. Patient states       

      feeling better. Patient states symptoms have improved.                                      

                                                                                                  

Vital Signs:                                                                                      

12:39 BP 98 / 58; Pulse 67; Resp 15; Temp 97.1; Pulse Ox 97% ;                                jl7 

13:00  / 53; Pulse 63; Resp 15; Pulse Ox 98% ;                                          jl7 

14:00  / 55; Pulse 60; Resp 15; Pulse Ox 99% ;                                          jl7 

14:30  / 55 Supine; Pulse 61;                                                           jl7 

14:31  / 52 Sitting; Pulse 60;                                                          jl7 

14:32  / 53 Standing; Pulse 63;                                                         jl7 

15:31  / 59; Pulse 59; Resp 15; Pulse Ox 97% ;                                          jl7 

17:00  / 54; Pulse 64; Resp 15; Pulse Ox 99% ;                                          hb  

                                                                                                  

ED Course:                                                                                        

12:30 Client placed on continuous cardiac and pulse oximetry monitoring. NIBP monitoring      jl7 

      applied.                                                                                    

12:30 Maintain EMS IV. Dressing intact. Good blood return noted. Site clean \T\ dry. Gauge \T\    jl
7

      site: 18 right AC.                                                                          

12:33 Patient arrived in ED.                                                                  hb  

12:39 Cecilia Hayden, RN is Primary Nurse.                                                      jl7 

12:39 Patient has correct armband on for positive identification.                             hb  

12:42 Triage completed.                                                                       jl7 

12:42 Arm band placed on right wrist.                                                         jl7 

12:45 Flakita Rabago MD is Attending Physician.                                           sd2 

12:45 EKG done, by ED staff, reviewed by Flakita Rabago MD.                                 jl7 

13:31 XRAY Chest (1 view) In Process Unspecified.                                             EDMS

18:21 No provider procedures requiring assistance completed. IV discontinued, intact,         jl7 

      bleeding controlled, No redness/swelling at site. Pressure dressing applied.                

                                                                                                  

Administered Medications:                                                                         

No medications were administered                                                                  

                                                                                                  

                                                                                                  

Medication:                                                                                       

12:39 VIS not applicable for this client.                                                     hb  

                                                                                                  

Outcome:                                                                                          

17:49 Discharge ordered by MD.                                                                sd2 

18:22 Discharged to home ambulatory.                                                          jl7 

18:22 Condition: stable                                                                           

18:22 Discharge instructions given to patient, Instructed on discharge instructions, follow       

      up and referral plans. Demonstrated understanding of instructions, follow-up care.          

18:22 Patient left the ED.                                                                    jl7 

                                                                                                  

Signatures:                                                                                       

Dispatcher [employer]                           EDMS                                                 

Marian Mijares RN RN                                                      

Cecilia Hayden, ANNEL                        RN   jl7                                                  

Flakita Rabago MD MD   sd2                                                  

                                                                                                  

Corrections: (The following items were deleted from the chart)                                    

12:44 12:42 PMHx: Hypertensive disorder; jl7                                                  jl7 

                                                                                                  

**************************************************************************************************

## 2022-08-22 NOTE — EKG
Test Date:    2022-08-20               Test Time:    12:32:02

Technician:   JOSE                                     

                                                     

MEASUREMENT RESULTS:                                       

Intervals:                                           

Rate:         64                                     

MT:           206                                    

QRSD:         96                                     

QT:           394                                    

QTc:          406                                    

Axis:                                                

P:            69                                     

MT:           206                                    

QRS:          -10                                    

T:            102                                    

                                                     

INTERPRETIVE STATEMENTS:                                       

                                                     

Sinus rhythm with premature atrial complexes

ST & T wave abnormality, consider lateral ischemia

Abnormal ECG

No previous ECG available for comparison



Electronically Signed On 08-22-22 08:06:55 CDT by Vincent Zapata

## 2022-08-25 ENCOUNTER — HOSPITAL ENCOUNTER (EMERGENCY)
Dept: HOSPITAL 97 - ER | Age: 67
Discharge: HOME | End: 2022-08-25
Payer: COMMERCIAL

## 2022-08-25 DIAGNOSIS — Z95.1: ICD-10-CM

## 2022-08-25 DIAGNOSIS — Z95.818: ICD-10-CM

## 2022-08-25 DIAGNOSIS — E11.9: ICD-10-CM

## 2022-08-25 DIAGNOSIS — I95.1: Primary | ICD-10-CM

## 2022-08-25 DIAGNOSIS — Z20.822: ICD-10-CM

## 2022-08-25 LAB
ALBUMIN SERPL BCP-MCNC: 2.8 G/DL (ref 3.4–5)
ALP SERPL-CCNC: 81 U/L (ref 45–117)
ALT SERPL W P-5'-P-CCNC: 25 U/L (ref 12–78)
AST SERPL W P-5'-P-CCNC: 10 U/L (ref 15–37)
BUN BLD-MCNC: 32 MG/DL (ref 7–18)
GLUCOSE SERPLBLD-MCNC: 147 MG/DL (ref 74–106)
HCT VFR BLD CALC: 27.6 % (ref 39.6–49)
LYMPHOCYTES # SPEC AUTO: 1.4 K/UL (ref 0.7–4.9)
MAGNESIUM SERPL-MCNC: 1.8 MG/DL (ref 1.8–2.4)
MCV RBC: 82.5 FL (ref 80–100)
PMV BLD: 7 FL (ref 7.6–11.3)
POTASSIUM SERPL-SCNC: 5.1 MMOL/L (ref 3.5–5.1)
RBC # BLD: 3.34 M/UL (ref 4.33–5.43)
TROPONIN I SERPL HS-MCNC: 13.5 PG/ML (ref ?–58.9)

## 2022-08-25 PROCEDURE — 80048 BASIC METABOLIC PNL TOTAL CA: CPT

## 2022-08-25 PROCEDURE — 36415 COLL VENOUS BLD VENIPUNCTURE: CPT

## 2022-08-25 PROCEDURE — 93005 ELECTROCARDIOGRAM TRACING: CPT

## 2022-08-25 PROCEDURE — 83735 ASSAY OF MAGNESIUM: CPT

## 2022-08-25 PROCEDURE — 80076 HEPATIC FUNCTION PANEL: CPT

## 2022-08-25 PROCEDURE — 87811 SARS-COV-2 COVID19 W/OPTIC: CPT

## 2022-08-25 PROCEDURE — 84484 ASSAY OF TROPONIN QUANT: CPT

## 2022-08-25 PROCEDURE — 85025 COMPLETE CBC W/AUTO DIFF WBC: CPT

## 2022-08-25 PROCEDURE — 71045 X-RAY EXAM CHEST 1 VIEW: CPT

## 2022-08-25 PROCEDURE — 96361 HYDRATE IV INFUSION ADD-ON: CPT

## 2022-08-25 PROCEDURE — 96360 HYDRATION IV INFUSION INIT: CPT

## 2022-08-25 PROCEDURE — 99284 EMERGENCY DEPT VISIT MOD MDM: CPT

## 2022-08-25 NOTE — XMS REPORT
Continuity of Care Document

                           Created on:2022



Patient:ROXANNE SOTOMAYOR

Sex:Male

:1955

External Reference #:[id number]





Demographics







                          Address                   [address]



                                                    [place] [address]

 

                          Home Phone                [phone]

 

                          Mobile Phone              [phone]

 

                          Email Address             [email or web address]

 

                          Preferred Language        English

 

                          Marital Status            Unknown

 

                          Episcopalian Affiliation     Unknown

 

                          Race                      Unknown

 

                          Additional Race(s)        Unavailable



                                                    Unavailable



                                                    Other Race

 

                          Ethnic Group              Unknown









Author







                          Organization              [facility]

t

 

                          Address                   [address]



                                                    [place] [address]

 

                          Phone                     [phone]









Support







                Name            Relationship    Address         Phone

 

                KATHRYN LOZOYA               [address]   Unavailable



                                                [place] [phone] 

 

                MIKE MATHUR               Unavailable     [phone]

[phone]

 

                Kathryn Dseir               [address]   [phone]

[phone]



                                                [place] [address] 

 

                Unavailable     Unavailable     Unavailable     Unavailable









Care Team Providers







                    Name                Role                Phone

 

                    TYSONMICHAEL HENDRICKSG     Primary Care Physician Unavailable

 

                    PASHA WILDER        Attending Clinician Unavailable

 

                    YODIT HORN      Attending Clinician Unavailable

 

                    ELIZABETH ESCALERA Attending Clinician Unavailable

 

                    Bui_Q               Attending Clinician Unavailable

 

                    [email or web address]        Attending Clinician Unavailable

 

                    Blanche Rosenberg MD Attending Clinician [phone]

 

                    PASHA WILDER M.D.  Attending Clinician Unavailable

 

                    CLINT PALOMO M.D. Attending Clinician Unavailable

 

                    DOMO AGUIAR Attending Clinician Unavailable

 

                    CECIL ISIDRO Attending Clinician Unavailable

 

                    ANGEL LOREDO Admitting Clinician Unavailable

 

                    Bui_Q               Admitting Clinician Unavailable

 

                    [id number]        Admitting Clinician Unavailable

 

                    LEELA BERRIOS Admitting Clinician Unavailable

 

                    CORNELIO FRAUSTO      Admitting Clinician Unavailable









Payers







           Payer Name Policy Type Policy Number Effective Date Expiration Date GLAD[employer] whatley

 

           WELLCARE/WELLCARE            [id number]   2020            



           [employer]                        00:00:00              

 

           WELLCARE [facility]            [id number]   2020            



                                            00:00:00              

 

           S [facility] NT            [id number] 2022            



                                            00:00:00              

 

           WELLCARE [facility]            [id number]   2020            



           [employer] (MEDICARE                       00:00:00              



           REPLACEMENT/ADVANTA                                             



           GE - HMO)                                              

 

           TRANSACT RX (MOVED            [id number]                           



           HOLD)                                                  

 

           [place]ARE [place]            [id number]   2020            



           [employer]                       00:00:00              



           (MEDICARE                                              



           REPLACEMENT HMO)                                             







Problems







       Condition Condition Condition Status Onset  Resolution Last   Treating Co

mments 

Source



       Name   Details Category        Date   Date   Treatment Clinician        



                                                 Date                 

 

       Effusion Effusion Disease Active                              [facility]



       of knee of knee               6-22                               Health



       joint, joint,               00:00:                             



       left   left                 00                                 

 

       Effusion Effusion Disease Active                              [facility]



       of knee of knee               6-22                               [facility]



       joint  joint                00:00:                             



       right  right                00                                 

 

       Right hip Right hip Disease Active 2021                             [facility]



       pain   pain                 2-10                               Health



                                   00:00:                             



                                   00                                 

 

       Trochanter Trochanter Disease Active 2021                             [facility]

T



       ic     ic                   2-10                               Health



       bursitis bursitis               00:00:                             



       of right of right               00                                 



       hip    hip                                                     

 

       Primary Primary Disease Active 2021                             [facility]



       osteoarthr osteoarthr               2-10                               He

alth



       itis of itis of               00:00:                             



       right hip right hip               00                                 

 

       Class 1 Class 1 Disease Active 2021                             [facility]



       obesity obesity               2-10                               Health



       due to due to               00:00:                             



       excess excess               00                                 



       calories calories                                                  



       with   with                                                    



       serious serious                                                  



       comorbidit comorbidit                                                  



       y and body y and body                                                  



       mass index mass index                                                  



       (BMI) of (BMI) of                                                  



       34.0 to 34.0 to                                                  



       34.9 in 34.9 in                                                  



       adult  adult                                                   

 

       Primary Primary Disease Active 2021                             [facility]



       osteoarthr osteoarthr               0-15                               He

alth



       itis of itis of               00:00:                             



       both knees both knees               00                                 

 

       Acquired Acquired Disease Active 2021                             [facility]



       varus  varus                0-15                               Health



       deformity deformity               00:00:                             



       knee,  knee,                00                                 



       right  right                                                   

 

       Acquired Acquired Disease Active 2021                             [facility]



       varus  varus                0-15                               Health



       deformity deformity               00:00:                             



       knee, left knee, left               00                                 

 

       Limp   Limp   Disease Active 2021                             [facility]



                                   0-15                               Health



                                   00:00:                             



                                   00                                 

 

       Acute pain Acute pain Disease Active 2021                             [facility]

T



       of left of left               0-15                               Health



       knee   knee                 00:00:                             



                                   00                                 

 

       Dyslipidem Dyslipidem Problem Active                              [facility]

[facility]



       ia     ia                   6-04                               Family



                                   00:00:                             [facility]



                                   00                                 e

 

       Morbid Morbid Problem Active                              [place]



       obesity Obesity               3-18                               Family



                                   00:00:                             [facility]ic



                                   00                                 e

 

       Diabetes Diabetes Problem Active                              [place]

[place]



       mellitus Mellitus               3-03                               Family



                                   00:00:                             Practic



                                   00                                 e

 

       Angina Angina Problem Active                              [facility]



       co-occurre Co-occurre               1-19                               Fa

solomon



       nt and due nt and Due               00:00:                             Pr

actic



       to     to                   00                                 e



       coronary Coronary                                                  



       arterioscl Arterioscl                                                  



       erosis erosis                                                  

 

       Chronic Chronic Problem Active                              [facility]



       kidney Kidney               9-12                               Family



       disease Disease               00:00:                             [facility]



       stage 3 Stage 3               00                                 e

 

       Acute  Acute  Disease Active                              [facility]



       coronary coronary               [facility]                               [facility]



       syndrome syndrome               00:00:                             [facility]

l



                                   00                                 Center

 

       Cobalamin Cobalamin Problem Active 2019                             [facility]

[place]



       deficiency Deficiency               1-12                               

[facility]



                                   00:00:                             [facility]



                                   00                                 e

 

       Peripheral Peripheral Problem Active 2019                             [facility]

[facility]



       vascular Vascular               1-12                               Family



       disease Disease               00:00:                             [facility]



                                   00                                 e

 

       Type 2 Type 2 Problem Active 2019                             [facility]



       diabetes Diabetes               1-01                               Family



       mellitus Mellitus               00:00:                             [facility]



       with   with                 00                                 e



       peripheral Peripheral                                                  



       angiopathy Angiopathy                                                  

 

       Neuropathy Neuropathy Problem Active                              [facility]

[place]



       due to Due to               9-10                               Family



       diabetes Diabetes               00:00:                             Pracpj

c



       mellitus Mellitus               00                                 e

 

       Anemia Anemia Problem Active                              [facility]



                                   9-10                               Family



                                   00:00:                             [facility]



                                   00                                 e

 

       Obstructiv Obstructiv Problem Active                              [facility]

[place]



       e sleep e Sleep               9-10                               Family



       apnea  Apnea                00:00:                             [facility]



       syndrome Syndrome               00                                 e

 

       Hypertensi Hypertensi Problem Active                              [facility]

[facility]



       ve     ve                   9-10                               Family



       disorder Disorder               00:00:                             Practi

c



                                   00                                 e

 

       Gastroesop Gastroesop Problem Active                              [facility]

[facility]



       hageal hageal               9-10                               Family



       reflux Reflux               00:00:                             [facility]



       disease Disease               00                                 e



       without without                                                  



       esophagiti Esophagiti                                                  



       s      s                                                       

 

       Lumbar Lumbar Problem Active                              [facility]



       radiculopa Radiculopa               9-10                               

[facility]



       thy    thy                  00:00:                             [facility]



                                   00                                 e

 

       History of History of Problem Active                              [facility]

[facility]



       ce[facility]               9[facility]10                               [facility]



       cular  cular                00:00:                             [facility]



       accident Accident               00                                 e

 

       Unstable Unstable Disease Active                              [facility]

[facility]



       angina angina               7-06                               [facility]



                                   [facility]                             [facility]



                                   00                                 Center

 

       History of History of Problem Resolve                                    

[facility]



       hypertensi hypertensi        d                                         Ph

ysici



       on     on                                                      ans

 

       History of History of Problem Resolve                                    

[facility]



       diabetes diabetes        d                                         Physic

i



       mellitus mellitus                                                  ans

 

       History of History of Problem Resolve                                    

[facility]



       heart  heart         d                                         Physici



       attack attack                                                  ans

 

       Bilateral Bilateral Problem Active                                    [facility]



       knee pain knee pain                                                  Phys

ici



                                                                      ans

 

       Primary Primary Problem Active                                    [facility]



       localized localized                                                  Phys

ici



       osteoarthr osteoarthr                                                  an

s



       itis of itis of                                                  



       knees, knees,                                                  



       bilateral bilateral                                                  

 

       Acquired Acquired Problem Active                                    [facility]



       genu varum genu varum                                                  Ph

ysici



       of both of both                                                  ans



       lower  lower                                                   



       extremitie extremitie                                                  



       s      s                                                       

 

       Acquired Acquired Problem Active                                    [facility]



       genu   genu                                                    [facility]ici



       varum, varum,                                                  ans



       left   left                                                    

 

       Abnormal Abnormal Problem Active                                    [facility]



       accelerati accelerati                                                  Ph

ysici



       ng of gait ng of gait                                                  an

s

 

       Acute pain Acute pain Problem Active                                    [facility]

T



       of left of left                                                  Physici



       knee   knee                                                    ans

 

       Osteoarthr Osteoarthr Problem Active                                    [facility]

[facility]



       itis of itis of                                                  Family



       knee   Knee                                                    [facility]



                                                                      e







Allergies, Adverse Reactions, Alerts







       Allergy Allergy Status Severity Reaction(s) Onset  Inactive Treating Comm

ents 

Source



       Name   Type                        Date   Date   Clinician        

 

       NO KNOWN Drug   Active                                           [facility]



       ALLERGIE Class                                                   ity of



       [facility]

 

       NO KNOWN Allergy Active                                           [facility]



       ALLERGIE                                                         



       S                                                              







Family History







           Family Member Diagnosis  Comments   Start Date Stop Date  Source

 

           Unknown Family Family history of Family History                      

 [facility]



           Member     Heart trouble                                  

 

           Unknown Family Family history of Family History                      

 [facility]



           Member     diabetes mellitus                                  

 

           Unknown Family Family history of Family History                      

 [facility]



           Member     hypertension                                  







Social History







           Social Habit Start Date Stop Date  Quantity   Comments   Source

 

           [facility]



           Alcohol Std Drinks                                             

 

           [facility]



           Alcohol Binge                                             

 

           [facility]



           Alcohol Comment                                             

 

           History of tobacco                       Cigarette Smoker            

[facility]



           use                                                    

 

           Exposure to 2022 Not sure              [facility]



           SARS-CoV-2 (event) 00:00:00   09:55:00                         

 

           Tobacco use and 2022 Smokeless tobacco            [facility]



           exposure   00:00:00   00:00:00   non-user              

 

           Alcohol intake 2022 Lifetime              [facility]



                      00:00:00   00:00:00   non-drinker            



                                            (finding)             

 

           Cigarette  2022                       [facility]



           pack-years 00:00:00   00:00:00                         

 

           History SDOH 2021-10-15 2021-10-15 1                     [facility]



           Alcohol Frequency 00:00:00   00:00:00                         

 

           Sex Assigned At 1955                       [facility]



           Birth      00:00:00   00:00:00                         









                Smoking Status  Start Date      Stop Date       Source

 

                [facility] ever smoked                                 [facility]

 

                Never smoked tobacco                                 [facility]







Medications







       Ordered Filled Start  Stop   Current Ordering Indication Dosage Frequency

 Signature

                    Comments            Components          Source



     Medication Medication Date Date Medication? Clinician                (SIG) 

          



     Name Name                                                   

 

     bupivacaine      2021- No        [id number] 1mL                    

  [facility]



     (Marcaine)      2-10 12-10           9100                          Health



     0.25 %      19:22: 19:22                                         



     injection 1      48   :00                                          



     mL                                                          

 

     lidocaine      2021- No        [id number] 1mL                      

[facility]



     (Xylocaine)      2-10 12-10           9100                          Health



     1 %       19:22: 19:22                                         



     injection 1      48   :00                                          



     mL                                                          

 

     triamcinolo      2021- No        [id number] 10mg                   

  [facility]



     ne        2-10 12-10           9100                          Health



     acetonide      19:22: 19:22                                         



     (Kenalog)      48   :00                                          



     10 MG/ML                                                        



     injection                                                        



     10 mg                                                        

 

     triamcinolo      2021- No        [id number] 10mg      10 mg,       

    [facility]



     ne        2-10 12-10           9100           Intra-mya           Health



     acetonide      19:22: 19:22                          cular,           



     (Kenalog)      48   :00                           Once PRN           



     10 MG/ML                                         Procedure,           



     injection                                         Starting           



     10 mg                                         on Fri           



                                                  12/10/21           



                                                  at 1322,           



                                                  For 1 dose           

 

     lidocaine      2021- No        [id number] 1mL       1 mL,          

 [facility]



     (Xylocaine)      2-10 12-10           9100           Injection,           [facility]

ealth



     1 %       19:22: 19:22                          Once PRN           



     injection 1      48   :00                           Procedure,           



     mL                                           Starting           



                                                  on Fri           



                                                  12/10/21           



                                                  at 1322,           



                                                  For 1 dose           

 

     bupivacaine      2021- No        [id number] 1mL       1 mL,        

   [facility]



     (Marcaine)      2-10 12-10           9100           Injection,           [facility]

alth



     0.25 %      19:22: 19:22                          Once PRN           



     injection 1      48   :00                           Procedure,           



     mL                                           Starting           



                                                  on Fri           



                                                  12/10/21           



                                                  at 1322,           



                                                  For 1 dose           

 

     Diclofenac      2021- No        [id number]      [id number] Apply   

        [facility]



     Sodium      2-10 01-10           9102      [id number] topically           [facility]

ealth



     (Voltaren)      00:00: 05:59                     3D   3 (three)           



     1 %       00   :00                           times a           



     external                                         day if           



     gel                                          needed           



                                                  (pain).           



                                                  APPLY 4           



                                                  GRAMS DONT           



                                                  EXCEED 16           



                                                  QD             

 

     Sodium      2021- No        [id number] 20mg                     [facility]



     Hyaluronate                                               Health



     solution      15:38: 15:38                                         



     prefilled      48   :00                                          



     syringe 20                                                        



     mg                                                          

 

     Sodium      2021- No        [id number] 20mg                     [facility]



     Hyaluronate                                               [facility]



     solution      15:38: 15:38                                         



     prefilled      48   :00                                          



     syringe 20                                                        



     mg                                                          

 

     Sodium      2021- No        [id number] 20mg      20 mg,           [facility]



     Hyaluronate                                Intra-mya           [facility]

ealth



     solution      15:38: 15:38                          cular,           



     prefilled      48   :00                           Once PRN           



     syringe 20                                         Procedure,           



     mg                                           Starting           



                                                  on 21           



                                                  at 0938,           



                                                  For 1 dose           

 

     Sodium      2021- No        [id number] 20mg      20 mg,           [facility]



     Hyaluronate                                Intra-mya           [facility]

ealth



     solution      15:38: 15:38                          cular,           



     prefilled      48   :00                           Once PRN           



     syringe 20                                         Procedure,           



     mg                                           Starting           



                                                  on 21           



                                                  at 0938,           



                                                  For 1 dose           

 

     Sodium      2021- No        [id number] 20mg                     [facility]



     Hyaluronate                                               Health



     solution      15:15: 15:15                                         



     prefilled      27   :00                                          



     syringe 20                                                        



     mg                                                          

 

     Sodium      2021- No        [id number] 20mg                     [facility]



     Hyaluronate                                               [facility]



     solution      15:15: 15:15                                         



     prefilled      27   :00                                          



     syringe 20                                                        



     mg                                                          

 

     Sodium      2021- No        [id number] 20mg      20 mg,           [facility]



     Hyaluronate                                Intra-mya           [facility]

ealth



     solution      15:15: 15:15                          cular,           



     prefilled      27   :00                           Once PRN           



     syringe 20                                         Procedure,           



     mg                                           Starting           



                                                  on 21           



                                                  at 0915,           



                                                  For 1 dose           

 

     Sodium      2021- No        [id number] 20mg      20 mg,           [facility]



     Hyaluronate                                Intra-mya           [facility]

ealth



     solution      15:15: 15:15                          cular,           



     prefilled      27   :00                           Once PRN           



     syringe 20                                         Procedure,           



     mg                                           Starting           



                                                  on 21           



                                                  at 0915,           



                                                  For 1 dose           

 

     Sodium      2021- No        [id number] 20mg                     [facility]



     Hyaluronate                                               Health



     solution      14:39: 14:39                                         



     prefilled      41   :00                                          



     syringe 20                                                        



     mg                                                          

 

     Sodium      2021- No        [id number] 20mg                     [facility]



     Hyaluronate                                               [facility]



     solution      14:39: 14:39                                         



     prefilled      41   :00                                          



     syringe 20                                                        



     mg                                                          

 

     Sodium      2021- No        [id number] 20mg      20 mg,           [facility]



     Hyaluronate                                Intra-mya           [facility]

ealth



     solution      14:39: 14:39                          cular,           



     prefilled      41   :00                           Once PRN           



     syringe 20                                         Procedure,           



     mg                                           Starting           



                                                  on 21 at           



                                                  0939, For           



                                                  1 dose           

 

     Sodium      2021- No        [id number] 20mg      20 mg,           [facility]



     Hyaluronate                                Intra-mya           [facility]

e[facility]



     solution      14:39: 14:39                          cular,           



     prefilled      41   :00                           Once PRN           



     syringe 20                                         Procedure,           



     mg                                           Starting           



                                                  on 21 at           



                                                  0939, For           



                                                  1 dose           

 

     pregabalin      2021      Yes                      pregabalin           [facility]

T



     (Lyrica) 50      0-15                               50 mg           Health



     MG capsule      09:07:                               capsule TK           



               56                                 1 C PO QD.           

 

     sAXagliptin      2021      Yes                 QD   1 (one)           [facility]



     (Onglyza) 5      0-15                               time each           [facility]

[facility]



     MG tablet      09:07:                               day.           



               56                                                

 

     pregabalin      2021      Yes                      pregabalin           [facility]

T



     (Lyrica) 50      0-15                               50 mg           [facility]



     MG capsule      09:07:                               capsule TK           



               56                                 1 C PO QD.           

 

     sAXagliptin      2021      Yes                 QD   1 (one)           [facility]



     (Onglyza) 5      0-15                               time each           [facility]

[facility]



     MG tablet      09:07:                               day.           



               56                                                

 

     pregabalin      2021      Yes                      pregabalin           U

T



     (Lyrica) 50      0-15                               50 mg           [facility]



     MG capsule      09:07:                               capsule TK           



               56                                 1 C PO QD.           

 

     sAXagliptin      2021      Yes                 QD   1 (one)           [facility]



     (Onglyza) 5      0-15                               time each           [facility]

[facility]



     MG tablet      09:07:                               day.           



               56                                                

 

     pregabalin      2021      Yes                      pregabalin           U

T



     (Lyrica) 50      0-15                               50 mg           [facility]



     MG capsule      09:07:                               capsule TK           



               56                                 1 C PO QD.           

 

     sAXagliptin      2021      Yes                 QD   1 (one)           [facility]



     (Onglyza) 5      0-15                               time each           [facility]

[facility]



     MG tablet      09:07:                               day.           



               56                                                

 

     pregabalin      2021      Yes                      pregabalin           U

T



     (Lyrica) 50      0-15                               50 mg           [facility]



     MG capsule      09:07:                               capsule TK           



               56                                 1 C PO QD.           

 

     sAXagliptin      2021      Yes                 QD   1 (one)           [facility]



     (Onglyza) 5      0-15                               time each           [facility]

[facility]



     MG tablet      09:07:                               day.           



               56                                                

 

     pregabalin      2021      Yes                      pregabalin           U

T



     (Lyrica) 50      0-15                               50 mg           [facility]



     MG capsule      09:07:                               capsule TK           



               56                                 1 C PO QD.           

 

     sAXagliptin      2021      Yes                 QD   1 (one)           [facility]



     (Onglyza) 5      0-15                               time each           [facility]

[facility]



     MG tablet      09:07:                               day.           



               56                                                

 

     pregabalin      2021      Yes                      pregabalin           U

T



     (Lyrica) 50      0-15                               50 mg           [facility]



     MG capsule      09:07:                               capsule TK           



               56                                 1 C PO QD.           

 

     sAXagliptin      2021      Yes                 QD   1 (one)           [facility]



     (Onglyza) 5      0-15                               time each           [facility]

[facility]



     MG tablet      09:07:                               day.           



               56                                                

 

     empaglifloz      2021      Yes                 QD   1 (one)           [facility]



     in        0-15                               time each           [facility]



     (Jardiance)      09:07:                               day.           



     25 MG      55                                                

 

     glipiZIDE      2021      Yes                      glipizide           [facility]



     XL        0-15                               ER 10 mg           [facility]



     (Glucotrol      09:07:                               tablet,           



     XL) 10 MG      55                                 extended           



     24 hr                                         release 24           



     tablet                                         hr Take 1           



                                                  tablet           



                                                  every day           



                                                  by oral           



                                                  route.           

 

     insulin      2021      Yes                      Lantus           [facility]



     glargine      0-15                               [facility]ostar           [facility]



     (Lantus      09:07:                               U-100           



     SoloStar)      55                                 Insulin           



     100 UNIT/ML                                         100            



     injection                                         unit/mL (3           



                                                  mL)            



                                                  subcutaneo           



                                                  us pen per           



                                                  endo 50           



                                                  units           



                                                  twice a           



                                                  day            

 

     isosorbide      2021      Yes                      isosorbide           [facility]

T



     mononitrate      0-15                               mononitrat           [facility]

[facility]



     ER (Imdur)      09:07:                               e ER 30 mg           



     30 MG 24 hr      55                                 tablet,ext           



     tablet                                         ended           



                                                  release 24           



                                                  hr             

 

     metFORMIN      2021      Yes                      metformin           [facility]



     (Glucophage      0-15                               1,000 mg           [facility]

[facility]



     ) 1000 MG      09:07:                               tablet           



     tablet      55                                                

 

     empaglifloz      2021      Yes                 QD   1 (one)           [facility]



     in        0-15                               time each           [facility]



     (Jardiance)      09:07:                               day.           



     25 MG      55                                                

 

     glipiZIDE      2021      Yes                      glipizide           [facility]



     XL        0-15                               ER 10 mg           [facility]



     (Glucotrol      09:07:                               tablet,           



     XL) 10 MG      55                                 extended           



     24 hr                                         release 24           



     tablet                                         hr Take 1           



                                                  tablet           



                                                  every day           



                                                  by oral           



                                                  route.           

 

     insulin      2021      Yes                      Lantus           [facility]



     glargine      0-15                               [facility]           [facility]



     (Lantus      09:07:                               U-100           



     SoloStar)      55                                 Insulin           



     100 UNIT/ML                                         100            



     injection                                         unit/mL (3           



                                                  mL)            



                                                  subcutaneo           



                                                  us pen per           



                                                  endo 50           



                                                  units           



                                                  twice a           



                                                  day            

 

     isosorbide      2021      Yes                      isosorbide           [facility]

T



     mononitrate      0-15                               mononitrat           [facility]

[facility]



     ER (Imdur)      09:07:                               e ER 30 mg           



     30 MG 24 hr      55                                 tablet,ext           



     tablet                                         ended           



                                                  release 24           



                                                  hr             

 

     metFORMIN      2021      Yes                      metformin           [facility]



     (Glucophage      0-15                               1,000 mg           [facility]

[facility]



     ) 1000 MG      09:07:                               tablet           



     tablet      55                                                

 

     empaglifloz      2021      Yes                 QD   1 (one)           [facility]



     in        0-15                               time each           [facility]



     (Jardiance)      09:07:                               day.           



     25 MG      55                                                

 

     glipiZIDE      2021      Yes                      glipizide           [facility]



     XL        0-15                               ER 10 mg           [facility]



     (Glucotrol      09:07:                               tablet,           



     XL) 10 MG      55                                 extended           



     24 hr                                         release 24           



     tablet                                         hr Take 1           



                                                  tablet           



                                                  every day           



                                                  by oral           



                                                  route.           

 

     insulin      2021      Yes                      Lantus           [facility]



     glargine      0-15                               [facility]           [facility]



     (Lantus      09:07:                               U-100           



     SoloStar)      55                                 Insulin           



     100 UNIT/ML                                         100            



     injection                                         unit/mL (3           



                                                  mL)            



                                                  subcutaneo           



                                                  us pen per           



                                                  endo 50           



                                                  units           



                                                  twice a           



                                                  day            

 

     isosorbide      2021      Yes                      isosorbide           [facility]

T



     mononitrate      0-15                               mononitrat           [facility]

[facility]



     ER (Imdur)      09:07:                               e ER 30 mg           



     30 MG 24 hr      55                                 tablet,ext           



     tablet                                         ended           



                                                  release 24           



                                                  hr             

 

     metFORMIN      2021      Yes                      metformin           [facility]



     (Glucophage      0-15                               1,000 mg           [facility]

[facility]



     ) 1000 MG      09:07:                               tablet           



     tablet      55                                                

 

     empaglifloz      2021      Yes                 QD   1 (one)           [facility]



     in        0-15                               time each           [facility]



     (Jardiance)      09:07:                               day.           



     25 MG      55                                                

 

     glipiZIDE      2021      Yes                      glipizide           [facility]



     XL        0-15                               ER 10 mg           [facility]



     (Glucotrol      09:07:                               tablet,           



     XL) 10 MG      55                                 extended           



     24 hr                                         release 24           



     tablet                                         hr Take 1           



                                                  tablet           



                                                  every day           



                                                  by oral           



                                                  route.           

 

     insulin      2021      Yes                      Lantus           [facility]



     glargine      0-15                               [facility]           [facility]



     (Lantus      09:07:                               U-100           



     SoloStar)      55                                 Insulin           



     100 UNIT/ML                                         100            



     injection                                         unit/mL (3           



                                                  mL)            



                                                  subcutaneo           



                                                  us pen per           



                                                  endo 50           



                                                  units           



                                                  twice a           



                                                  day            

 

     isosorbide      2021      Yes                      isosorbide           U

T



     mononitrate      0-15                               mononitrat           [facility]

[facility]



     ER (Imdur)      09:07:                               e ER 30 mg           



     30 MG 24 hr      55                                 tablet,ext           



     tablet                                         ended           



                                                  release 24           



                                                  hr             

 

     metFORMIN      2021      Yes                      metformin           [facility]



     (Glucophage      0-15                               1,000 mg           [facility]

[facility]



     ) 1000 MG      09:07:                               tablet           



     tablet      55                                                

 

     empaglifloz      2021      Yes                 QD   1 (one)           [facility]



     in        0-15                               time each           [facility]



     (Jardiance)      09:07:                               day.           



     25 MG      55                                                

 

     glipiZIDE      2021      Yes                      glipizide           [facility]



     XL        0-15                               ER 10 mg           [facility]



     (Glucotrol      09:07:                               tablet,           



     XL) 10 MG      55                                 extended           



     24 hr                                         release 24           



     tablet                                         hr Take 1           



                                                  tablet           



                                                  every day           



                                                  by oral           



                                                  route.           

 

     insulin      2021      Yes                      Lantus           [facility]



     glargine      0-15                               [facility]ar           [facility]



     (Lantus      09:07:                               U-100           



     SoloStar)      55                                 Insulin           



     100 UNIT/ML                                         100            



     injection                                         unit/mL (3           



                                                  mL)            



                                                  subcutaneo           



                                                  us pen per           



                                                  endo 50           



                                                  units           



                                                  twice a           



                                                  day            

 

     isosorbide      2021      Yes                      isosorbide           [facility]

[facility]



     mononitrate      0-15                               mononitrat           [facility]

[facility]



     ER (Imdur)      09:07:                               e ER 30 mg           



     30 MG 24 hr      55                                 tablet,ext           



     tablet                                         ended           



                                                  release 24           



                                                  hr             

 

     metFORMIN      2021      Yes                      metformin           [facility]



     (Glucophage      0-15                               1,000 mg           [facility]



     ) 1000 MG      09:07:                               tablet           



     tablet      55                                                

 

     empaglifloz      2021      Yes                 QD   1 (one)           [facility]



     in        0-15                               time each           [facility]



     (Jardiance)      09:07:                               day.           



     25 MG      55                                                

 

     glipiZIDE      2021      Yes                      glipizide           [facility]



     XL        0-15                               ER 10 mg           [facility]



     (Glucotrol      09:07:                               tablet,           



     XL) 10 MG      55                                 extended           



     24 hr                                         release 24           



     tablet                                         hr Take 1           



                                                  tablet           



                                                  every day           



                                                  by oral           



                                                  route.           

 

     insulin      2021      Yes                      Lantus           [facility]



     glargine      0-15                               [facility]           [facility]



     (Lantus      09:07:                               U-100           



     SoloStar)      55                                 Insulin           



     100 UNIT/ML                                         100            



     injection                                         unit/mL (3           



                                                  mL)            



                                                  subcutaneo           



                                                  us pen per           



                                                  endo 50           



                                                  units           



                                                  twice a           



                                                  day            

 

     isosorbide      2021      Yes                      isosorbide           [facility]

T



     mononitrate      0-15                               mononitrat           [facility]

[facility]



     ER (Imdur)      09:07:                               e ER 30 mg           



     30 MG 24 hr      55                                 tablet,ext           



     tablet                                         ended           



                                                  release 24           



                                                  hr             

 

     metFORMIN      2021      Yes                      metformin           [facility]



     (Glucophage      0-15                               1,000 mg           [facility]



     ) 1000 MG      09:07:                               tablet           



     tablet      55                                                

 

     empaglifloz      2021      Yes                 QD   1 (one)           [facility]



     in        0-15                               time each           [facility]



     (Jardiance)      09:07:                               day.           



     25 MG      55                                                

 

     glipiZIDE      2021      Yes                      glipizide           [facility]



     XL        0-15                               ER 10 mg           [facility]



     (Glucotrol      09:07:                               tablet,           



     XL) 10 MG      55                                 extended           



     24 hr                                         release 24           



     tablet                                         hr Take 1           



                                                  tablet           



                                                  every day           



                                                  by oral           



                                                  route.           

 

     insulin      2021      Yes                      Lantus           [facility]



     glargine      0-15                               [facility]           [facility]



     (Lantus      09:07:                               U-100           



     SoloStar)      55                                 Insulin           



     100 UNIT/ML                                         100            



     injection                                         unit/mL (3           



                                                  mL)            



                                                  subcutaneo           



                                                  us pen per           



                                                  endo 50           



                                                  units           



                                                  twice a           



                                                  day            

 

     isosorbide      2021      Yes                      isosorbide           [facility]

T



     mononitrate      0-15                               mononitrat           [facility]

[facility]



     ER (Imdur)      09:07:                               e ER 30 mg           



     30 MG 24 hr      55                                 tablet,ext           



     tablet                                         ended           



                                                  release 24           



                                                  hr             

 

     metFORMIN      2021      Yes                      metformin           [facility]



     (Glucophage      0-15                               1,000 mg           [facility]



     ) 1000 MG      09:07:                               tablet           



     tablet      55                                                

 

     allopurinol      2021      Yes                      1 (one)           [facility]



     (Zyloprim)      0-15                               time each           [facility]

[facility]



     100 MG      09:07:                               day at the           



     tablet      54                                 same time.           

 

     atorvastati      2021      Yes                      atorvastat           

[facility]



     n (Lipitor)      0-15                               in 80 mg           [facility]

[facility]



     80 MG      09:07:                               tablet           



     tablet      54                                                

 

     allopurinol      2021      Yes                      1 (one)           [facility]



     (Zyloprim)      0-15                               time each           [facility]

[facility]



     100 MG      09:07:                               day at the           



     tablet      54                                 same time.           

 

     atorvastati      2021      Yes                      atorvastat           

[facility]



     n (Lipitor)      0-15                               in 80 mg           [facility]

[facility]



     80 MG      09:07:                               tablet           



     tablet      54                                                

 

     allopurinol      2021      Yes                      1 (one)           [facility]



     (Zyloprim)      0-15                               time each           [facility]



     100 MG      09:07:                               day at the           



     tablet      54                                 same time.           

 

     atorvastati      2021      Yes                      atorvastat           

[facility]



     n (Lipitor)      0-15                               in 80 mg           [facility]



     80 MG      09:07:                               tablet           



     tablet      54                                                

 

     allopurinol      2021      Yes                      1 (one)           [facility]



     (Zyloprim)      0-15                               time each           [facility]



     100 MG      09:07:                               day at the           



     tablet      54                                 same time.           

 

     atorvastati      2021      Yes                      atorvastat           

[facility]



     n (Lipitor)      0-15                               in 80 mg           [facility]



     80 MG      09:07:                               tablet           



     tablet      54                                                

 

     allopurinol      2021      Yes                      1 (one)           [facility]



     (Zyloprim)      0-15                               time each           [facility]



     100 MG      09:07:                               day at the           



     tablet      54                                 same time.           

 

     atorvastati      2021      Yes                      atorvastat           

[facility]



     n (Lipitor)      0-15                               in 80 mg           [facility]



     80 MG      09:07:                               tablet           



     tablet      54                                                

 

     allopurinol      2021      Yes                      1 (one)           [facility]



     (Zyloprim)      0-15                               time each           [facility]



     100 MG      09:07:                               day at the           



     tablet      54                                 same time.           

 

     atorvastati      2021      Yes                      atorvastat           

[facility]



     n (Lipitor)      0-15                               in 80 mg           [facility]

[facility]



     80 MG      09:07:                               tablet           



     tablet      54                                                

 

     allopurinol      2021      Yes                      1 (one)           [facility]



     (Zyloprim)      0-15                               time each           [facility]

[facility]



     100 MG      09:07:                               day at the           



     tablet      54                                 same time.           

 

     atorvastati      2021      Yes                      atorvastat           

[facility]



     n (Lipitor)      0-15                               in 80 mg           [facility]

[facility]



     80 MG      09:07:                               tablet           



     tablet      54                                                

 

     Diclofenac      2021- No        [id number]      [id number] Apply    

       [facility]



     Sodium      0-15 11-15                     [id number] topically           [facility]

[facility]



     (Voltaren)      00:00: 05:59                     3D   3 (three)           



     1 %       00   :00                           times a           



     external                                         day if           



     gel                                          needed           



                                                  (pain).           



                                                  APPLY 4           



                                                  GRAMS TO           



                                                  AFFECTED           



                                                  AREA NOT           



                                                  TO EXCEED           



                                                  16 GRAMS           



                                                  QS             

 

     Diclofenac      2021- No        [id number]      [id number] Apply    

       [facility]



     Sodium      0-15 11-15                     [id number] topically           [facility]

[facility]



     (Voltaren)      00:00: 05:59                     3D   3 (three)           



     1 %       00   :00                           times a           



     external                                         day if           



     gel                                          needed           



                                                  (pain).           



                                                  APPLY 4           



                                                  GRAMS TO           



                                                  AFFECTED           



                                                  AREA NOT           



                                                  TO EXCEED           



                                                  16 GRAMS           



                                                  QS             

 

     Diclofenac      2021- No        [id number]      [id number] Apply    

       [facility]



     Sodium      0-15 11-15                     [id number] topically           [facility]

[facility]



     (Voltaren)      00:00: 05:59                     3D   3 (three)           



     1 %       00   :00                           times a           



     external                                         day if           



     gel                                          needed           



                                                  (pain).           



                                                  APPLY 4           



                                                  GRAMS TO           



                                                  AFFECTED           



                                                  AREA NOT           



                                                  TO EXCEED           



                                                  16 GRAMS           



                                                  QS             

 

     hydrocortis      2021- No        [id number] 4[drp]      Place [facility]    

       [facility]



     one-acetic      4-08 14           [id number]           Drops in           it

y of



     acid 1-2 %      00:00: 04:59                          left ear 3           

[place]



     otic drops      00   :00                           (three)           [facility]

[facility]



                                                  times           [facility]



                                                  daily for           



                                                  5 days.           

 

     Diclofenac Diclofenac       Yes  PASHA                APPLY 4        

   [facility]



     Sodium 1 % Sodium 1 % 3-05           WILDER M.D.                GRAMS TO    

       [facility]



     External External 00:00:                               AFFECTED           a

ns



     Gel  Gel  00                                 AREA           



                                                  (LOWER           



                                                  EXTREMITIE           



                                                  S) FOUR           



                                                  TIMES A           



                                                  DAY. DO           



                                                  NOT APPLY           



                                                  MORE THAN           



                                                  16 GRAMS           



                                                  DAILY           

 

     cyclobenzap      2020-1      Yes                                     [facility]



     rine      2-08                                              [facility]



     (Flexeril)      00:00:                                              



     10 MG      00                                                



     tablet                                                        

 

     cyclobenzap      2020-1      Yes                                     [facility]



     rine      2-08                                              [facility]



     (Flexeril)      00:00:                                              



     10 MG      00                                                



     tablet                                                        

 

     cyclobenzap      2020-1      Yes                                     [facility]



     rine      2-08                                              [facility]



     (Flexeril)      00:00:                                              



     10 MG      00                                                



     tablet                                                        

 

     cyclobenzap      2020-1      Yes                                     [facility]



     rine      2-08                                              [facility]



     (Flexeril)      00:00:                                              



     10 MG      00                                                



     tablet                                                        

 

     cyclobenzap      2020-1      Yes                                     [facility]



     rine      2-08                                              [facility]



     (Flexeril)      00:00:                                              



     10 MG      00                                                



     tablet                                                        

 

     cyclobenzap      2020-1      Yes                                     [facility]



     rine      2-08                                              [facility]



     (Flexeril)      00:00:                                              



     10 MG      00                                                



     tablet                                                        

 

     cyclobenzap      2020-1      Yes                                     [facility]



     rine      2-08                                              [facility]



     (Flexeril)      00:00:                                              



     10 MG      00                                                



     tablet                                                        

 

     aspirin 81      2020-0      Yes            81mg QD   Take 81 mg           [facility]

[facility]



     MG EC      8-24                               by mouth           [facility]



     tablet      13:34:                               daily.           [address]

 

     atorvastati      -0      Yes            80mg QD   Take 80 mg           

[facility]



     n calcium      8-24                               by mouth           [facility]



     (ATORVASTAT      13:34:                               daily .           [facility]

ical



     IN ORAL)      11                                                [facility]

 

     CARVEDILOL      2020-0      Yes            25mg QD   Take 25 mg           [facility]

[facility]



     ORAL      8-24                               by mouth           [facility]



               13:34:                               daily .           [facility]



               11                                                Center

 

     losartan      2020-0      Yes            100mg      Take 100           [facility]



     potassium      8-24                               mg by           [facility]



     (LOSARTAN      13:34:                               mouth .           [facility]

al



     ORAL)      11                                                Center

 

     pantoprazol      2020-0      Yes            40mg QD   Take 40 mg           

[facility]



     e         8-24                               by mouth           [facility]



     (PROTONIX)      13:34:                               daily.           [facility]

al



     40 MG      11                                                Center



     tablet                                                        

 

     traMADol      2020-0      Yes            50mg      Take 50 mg           [facility]



     (ULTRAM) 50      8-24                               by mouth           [facility]

[facility]



     mg tablet      13:34:                               every 6           [facility]

al



               11                                 (six)           Center



                                                  hours as           



                                                  needed for           



                                                  Pain.           

 

     duloxetine      2020-0      Yes            30mg QD   Take 30 mg           [facility]

[facility]



     HCl       8-24                               by mouth           [facility]



     (DULOXETINE      13:34:                               daily .           Med

ical



     ORAL)      11                                                Center

 

     spironolact      2020-0      Yes            25mg QD   Take 25 mg           

[facility]



     one       8-24                               by mouth           [facility]



     (ALDACTONE)      13:34:                               daily.           [facility]

melissa



     25 MG      11                                                Center



     tablet                                                        

 

     omega-3      2020-0      Yes            2g   Q.5D Take 2 g           [facility]



     fatty      8-24                               by mouth 2           [facility]



     acids-fish      13:34:                               (two)           [facility]

[facility]



     oil       11                                 times           Center



     340-1,000                                         daily.           



     mg Cap per                                                        



     capsule                                                        

 

     folic acid      2020-0      Yes            1mg  QD   Take 1 mg           [facility]

[facility]



     (FOLVITE) 1      8-24                               by mouth           [facility]

[facility]



     MG tablet      13:34:                               daily.           [facility]

[facility]



               11                                                Center

 

     nitroglycer      2020-0      Yes            .4mg      Place 0.4           [facility]

[facility]



     in        8-24                               mg under           [facility]



     (NITROSTAT)      13:34:                               the tongue           

[facility]



     0.4 MG SL      11                                 every 5           Center



     tablet                                         (five)           



                                                  minutes as           



                                                  needed for           



                                                  Chest pain           



                                                  Put 1 pill           



                                                  under           



                                                  tongue           



                                                  every 5min           



                                                  as needed           



                                                  for chest           



                                                  pain.No           



                                                  more than           



                                                  3 doses in           



                                                  15min.Call           



                                                  911 if           



                                                  pain is           



                                                  unrelieved           



                                                  5min after           



                                                  1st dose .           

 

     ranolazine      2020-0      Yes            500mg Q.5D Take 500           [facility]

[facility]



     (RANEXA)      8-24                               mg by           [facility]



     500 MG 12      13:34:                               mouth 2           [facility]

al



     hr tablet      11                                 (two)           Center



                                                  times           



                                                  daily.           

 

     ticagrelor      2020-0      Yes            90mg Q.5D Take 90 mg           [facility]

[facility]



     (BRILINTA)      8-24                               by mouth 2           [facility]

[facility]



     90 mg Tab      13:34:                               (two)           [facility]



     tablet      11                                 times           Center



                                                  daily.           

 

     traMADol      2020-0      Yes            50mg      Take 50 mg           [facility]



     (Ultram) 50      8-24                               by mouth.           [facility]

[facility]



     MG tablet      00:00:                                              



               00                                                

 

     carvedilol      2020-0      Yes            25mg QD   Take 25 mg           [facility]

[facility]



     (Coreg) 1      8-24                               by mouth 1           [facility]

[facility]



     mg/mL      00:00:                               (one) time           



     solution      00                                 each day.           

 

     aspirin 81      2020-0      Yes                 QD   1 (one)           [facility]



     MG EC      8-24                               time each           [facility]



     tablet      00:00:                               day.           



               00                                                

 

     insulin      2020-0      Yes            30U  Q12H Inject 30           [facility]



     glargine      8-24                               Units           [facility]



     (Lantus)      00:00:                               under the           



     100 UNIT/ML      00                                 skin every           



     injection                                         12             



                                                  (twelve)           



                                                  hours.           

 

     pantoprazol      2020-0      Yes            40mg QD   Take 40 mg           

[facility]



     e         8-24                               by mouth 1           [facility]



     (ProtoNix)      00:00:                               (one) time           



     40 MG EC      00                                 each day.           



     tablet                                                        

 

     ticagrelor      2020-0      Yes            90mg Q12H Take 90 mg           [facility]

[facility]



     (Brilinta)      8-24                               by mouth           [facility]

[facility]



     90 MG      00:00:                               every 12           



     tablet      00                                 (twelve)           



                                                  hours.           

 

     traMADol      2020-0      Yes            50mg      Take 50 mg           [facility]



     (Ultram) 50      8-24                               by mouth.           [facility]



     MG tablet      00:00:                                              



               00                                                

 

     carvedilol      2020-0      Yes            25mg QD   Take 25 mg           [facility]

[facility]



     (Coreg) 1      8-24                               by mouth 1           [facility]

[facility]



     mg/mL      00:00:                               (one) time           



     solution      00                                 each day.           

 

     aspirin 81      2020-0      Yes                 QD   1 (one)           [facility]



     MG EC      8-24                               time each           [facility]



     tablet      00:00:                               day.           



               00                                                

 

     insulin      2020-0      Yes            30U  Q12H Inject 30           [facility]



     glargine      8-24                               Units           [facility]



     (Lantus)      00:00:                               under the           



     100 UNIT/ML      00                                 skin every           



     injection                                         12             



                                                  (twelve)           



                                                  hours.           

 

     pantoprazol      2020-0      Yes            40mg QD   Take 40 mg           

[facility]



     e         8-24                               by mouth 1           [facility]



     (ProtoNix)      00:00:                               (one) time           



     40 MG EC      00                                 each day.           



     tablet                                                        

 

     ticagrelor      2020-0      Yes            90mg Q12H Take 90 mg           [facility]

[facility]



     (Brilinta)      8-24                               by mouth           [facility]

[facility]



     90 MG      00:00:                               every 12           



     tablet      00                                 (twelve)           



                                                  hours.           

 

     traMADol      2020-0      Yes            50mg      Take 50 mg           [facility]



     (Ultram) 50      8-24                               by mouth.           [facility]



     MG tablet      00:00:                                              



               00                                                

 

     carvedilol      2020-0      Yes            25mg QD   Take 25 mg           [facility]

[facility]



     (Coreg) 1      8-24                               by mouth 1           [facility]

[facility]



     mg/mL      00:00:                               (one) time           



     solution      00                                 each day.           

 

     aspirin 81      2020-0      Yes                 QD   1 (one)           [facility]



     MG EC      8-24                               time each           [facility]



     tablet      00:00:                               day.           



               00                                                

 

     insulin      2020-0      Yes            30U  Q12H Inject 30           [facility]



     glargine      8-24                               Units           [facility]



     (Lantus)      00:00:                               under the           



     100 UNIT/ML      00                                 skin every           



     injection                                         12             



                                                  (twelve)           



                                                  hours.           

 

     pantoprazol      2020-0      Yes            40mg QD   Take 40 mg           

[facility]



     e         8-24                               by mouth 1           [facility]



     (ProtoNix)      00:00:                               (one) time           



     40 MG EC      00                                 each day.           



     tablet                                                        

 

     ticagrelor      2020-0      Yes            90mg Q12H Take 90 mg           [facility]

[facility]



     (Brilinta)      8-24                               by mouth           [facility]

[facility]



     90 MG      00:00:                               every 12           



     tablet      00                                 (twelve)           



                                                  hours.           

 

     traMADol      2020-0      Yes            50mg      Take 50 mg           [facility]



     (Ultram) 50      8-24                               by mouth.           [facility]



     MG tablet      00:00:                                              



               00                                                

 

     carvedilol      2020-0      Yes            25mg QD   Take 25 mg           [facility]

[facility]



     (Coreg) 1      8-24                               by mouth 1           [facility]

[facility]



     mg/mL      00:00:                               (one) time           



     solution      00                                 each day.           

 

     aspirin 81      2020-0      Yes                 QD   1 (one)           [facility]



     MG EC      8-24                               time each           [facility]



     tablet      00:00:                               day.           



               00                                                

 

     insulin      2020-0      Yes            30U  Q12H Inject 30           [facility]



     glargine      8-24                               Units           [facility]



     (Lantus)      00:00:                               under the           



     100 UNIT/ML      00                                 skin every           



     injection                                         12             



                                                  (twelve)           



                                                  hours.           

 

     pantoprazol      2020-0      Yes            40mg QD   Take 40 mg           

[facility]



     e         8-24                               by mouth 1           [facility]



     (ProtoNix)      00:00:                               (one) time           



     40 MG EC      00                                 each day.           



     tablet                                                        

 

     ticagrelor      2020-0      Yes            90mg Q12H Take 90 mg           [facility]

T



     (Brilinta)      8-24                               by mouth           [facility]

[facility]



     90 MG      00:00:                               every 12           



     tablet      00                                 (twelve)           



                                                  hours.           

 

     traMADol      2020-0      Yes            50mg      Take 50 mg           [facility]



     (Ultram) 50      8-24                               by mouth.           [facility]



     MG tablet      00:00:                                              



               00                                                

 

     carvedilol      2020-0      Yes            25mg QD   Take 25 mg           [facility]

T



     (Coreg) 1      8-24                               by mouth 1           [facility]

[facility]



     mg/mL      00:00:                               (one) time           



     solution      00                                 each day.           

 

     aspirin 81      2020-0      Yes                 QD   1 (one)           [facility]



     MG EC      8-24                               time each           [facility]



     tablet      00:00:                               day.           



               00                                                

 

     insulin      2020-0      Yes            30U  Q12H Inject 30           [facility]



     glargine      8-24                               Units           [facility]



     (Lantus)      00:00:                               under the           



     100 UNIT/ML      00                                 skin every           



     injection                                         12             



                                                  (twelve)           



                                                  hours.           

 

     pantoprazol      2020-0      Yes            40mg QD   Take 40 mg           

[facility]



     e         8-24                               by mouth 1           [facility]



     (ProtoNix)      00:00:                               (one) time           



     40 MG EC      00                                 each day.           



     tablet                                                        

 

     ticagrelor      2020-0      Yes            90mg Q12H Take 90 mg           [facility]

T



     (Brilinta)      8-24                               by mouth           [facility]

[facility]



     90 MG      00:00:                               every 12           



     tablet      00                                 (twelve)           



                                                  hours.           

 

     traMADol      2020-0      Yes            50mg      Take 50 mg           [facility]



     (Ultram) 50      8-24                               by mouth.           [facility]



     MG tablet      00:00:                                              



               00                                                

 

     carvedilol      2020-0      Yes            25mg QD   Take 25 mg           [facility]

T



     (Coreg) 1      8-24                               by mouth 1           [facility]

[facility]



     mg/mL      00:00:                               (one) time           



     solution      00                                 each day.           

 

     aspirin 81      2020-0      Yes                 QD   1 (one)           [facility]



     MG EC      8-24                               time each           [facility]



     tablet      00:00:                               day.           



               00                                                

 

     insulin      2020-0      Yes            30U  Q12H Inject 30           [facility]



     glargine      8-24                               Units           [facility]



     (Lantus)      00:00:                               under the           



     100 UNIT/ML      00                                 skin every           



     injection                                         12             



                                                  (twelve)           



                                                  hours.           

 

     pantoprazol      2020-0      Yes            40mg QD   Take 40 mg           

[facility]



     e         8-24                               by mouth 1           [facility]



     (ProtoNix)      00:00:                               (one) time           



     40 MG EC      00                                 each day.           



     tablet                                                        

 

     ticagrelor      2020-0      Yes            90mg Q12H Take 90 mg           [facility]

T



     (Brilinta)      8-24                               by mouth           [facility]

[facility]



     90 MG      00:00:                               every 12           



     tablet      00                                 (twelve)           



                                                  hours.           

 

     traMADol      2020-0      Yes            50mg      Take 50 mg           [facility]



     (Ultram) 50      8-24                               by mouth.           [facility]

[facility]



     MG tablet      00:00:                                              



               00                                                

 

     carvedilol      2020-0      Yes            25mg QD   Take 25 mg           [facility]

T



     (Coreg) 1      8-24                               by mouth 1           [facility]

[facility]



     mg/mL      00:00:                               (one) time           



     solution      00                                 each day.           

 

     aspirin 81      2020-0      Yes                 QD   1 (one)           [facility]



     MG EC      8-24                               time each           [facility]



     tablet      00:00:                               day.           



               00                                                

 

     insulin      2020-0      Yes            30U  Q12H Inject 30           [facility]



     glargine      8-24                               Units           [facility]



     (Lantus)      00:00:                               under the           



     100 UNIT/ML      00                                 skin every           



     injection                                         12             



                                                  (twelve)           



                                                  hours.           

 

     pantoprazol      2020-0      Yes            40mg QD   Take 40 mg           

[facility]



     e         8-24                               by mouth 1           [facility]



     (ProtoNix)      00:00:                               (one) time           



     40 MG EC      00                                 each day.           



     tablet                                                        

 

     ticagrelor      2020-0      Yes            90mg Q12H Take 90 mg           [facility]

T



     (Brilinta)      8-24                               by mouth           [facility]

[facility]



     90 MG      00:00:                               every 12           



     tablet      00                                 (twelve)           



                                                  hours.           

 

     insulin      2020-0      Yes            30U  Q.5D Inject 30           [facility]

t



     glargine      8-24                               Units           [facility]



     (LANTUS)      00:00:                               subcutaneo           [facility]

[facility]



     100 unit/mL      00                                 usly 2           Center



     injection                                         (two)           



                                                  times           



                                                  daily Use           



                                                  as             



                                                  directed .           

 

     isosorbide      2020-0      Yes            60mg QD   Take 1           [facility]

t



     mononitrate      1-23                               tablet (60           [facility]

[facility]



     (IMDUR) 60      00:00:                               mg total)           [facility]

[facility]



     MG 24 hr      00                                 by mouth           [facility]



     tablet                                         daily.           

 

     metFORMIN      -0 - No             1000mg Q.5D Take 2           [facility]



     (GLUCOPHAGE      1-23 01-22                          tablets           [facility]

[facility]



     ) 500 MG      00:00: 23:59                          (1,000 mg           [facility]

ical



     tablet      00   :00                           total) by           Center



                                                  mouth 2           



                                                  (two)           



                                                  times           



                                                  daily           



                                                  Resume on           



                                                  .           

 

     aspirin 81 aspirin 81           No             1    Q1D  aspirin 81        

   [facility]



     mg   mg                                      mg             [facility]



     tablet,luigi tablet,luigi                                    tablet,del      

     [facility]



     yed release yed release                                    ayed           e



     Take 1 Take 1                                    release           



     tablet tablet                                    Take 1           



     every day every day                                    tablet           



     by oral by oral                                    every day           



     route. route.                                    by oral           



                                                  route.           

 

     atorvastati atorvastati           No                       atorvastat      

     [facility]



     n 80 mg n 80 mg                                    in 80 mg           [facility]

[facility]



     tablet Take tablet Take                                    tablet          

 [facility]



     1 tablet 1 tablet                                    Take 1           e



     every day every day                                    tablet           



     by oral by oral                                    every day           



     route. route.                                    by oral           



                                                  route.           

 

     carvedilol carvedilol           No                       carvedilol        

   [facility]



     25 mg 25 mg                                    25 mg           [facility]



     tablet Take tablet Take                                    tablet          

 [facility]



     1 tablet 1 tablet                                    Take 1           e



     twice a day twice a day                                    tablet          

 



     by oral by oral                                    twice a           



     route. route.                                    day by           



                                                  oral           



                                                  route.           

 

     cyclobenzap cyclobenzap           No                       cyclobenza      

     [facility]



     rine 10 mg rine 10 mg                                    prine 10          

 Family



     tablet 1 tablet 1                                    mg tablet           Pr

actic



     TABLET AS TABLET AS                                    1 TABLET           e



     NEEDED FOR NEEDED FOR                                    AS NEEDED         

  



     PAIN EVERY PAIN EVERY                                    FOR PAIN          

 



     8 HRS 8 HRS                                    EVERY 8           



     ORALLY 30 ORALLY 30                                    HRS ORALLY          

 



     DAY(S) DAY(S)                                    30 DAY(S)           

 

     duloxetine duloxetine           No                       duloxetine        

   [facility]



     30 mg 30 mg                                    30 mg           Family



     capsule,del capsule,del                                    capsule,de      

     [facility]



     ayed ayed                                    layed           e



     release release                                    release           



     Take 1 Take 1                                    Take 1           



     capsule capsule                                    capsule           



     every day every day                                    every day           



     by oral by oral                                    by oral           



     route. route.                                    route.           

 

     folic acid folic acid           No             1    Q1D  folic acid        

   [facility]



     1 mg tablet 1 mg tablet                                    1 mg           F

amily



     Take 1 Take 1                                    tablet           [facility]



     tablet tablet                                    Take 1           e



     every day every day                                    tablet           



     by oral by oral                                    every day           



     route. route.                                    by oral           



                                                  route.           

 

     FreeStyle FreeStyle           No                       FreeStyle           

[facility]



     Lite Strips Lite Strips                                    Lite           F

amily



     Take 1 Take 1                                    Strips           [facility]



     strip 3 strip 3                                    Take 1           e



     times a day times a day                                    strip 3         

  



     by miscell. by miscell.                                    times a         

  



     route. route.                                    day by           



                                                  miscell.           



                                                  route.           

 

     isosorbide isosorbide           No                       isosorbide        

   [facility]



     mononitrate mononitrate                                    mononitrat      

     Family



     ER 30 mg ER 30 mg                                    e ER 30 mg           P

ractic



     tablet,exte tablet,exte                                    tablet,ext      

     e



     nded nded                                    ended           



     release 24 release 24                                    release 24        

   



     hr Take 1 hr Take 1                                    hr Take 1           



     tablet tablet                                    tablet           



     every day every day                                    every day           



     by oral by oral                                    by oral           



     route. route.                                    route.           

 

     Lantus Lantus           No                       Lantus           [facility]



     Solostar Solostar                                    Solostar           [facility]

[facility]



     U-100 U-100                                    U-100           [facility]



     Insulin 100 Insulin 100                                    Insulin         

  e



     unit/mL (3 unit/mL (3                                    100            



     mL)  mL)                                     unit/mL (3           



     subcutaneou subcutaneou                                    mL)            



     s pen per s pen per                                    subcutaneo          

 



     endo 50 endo 50                                    us pen per           



     units twice units twice                                    endo 50         

  



     a day a day                                    units           



                                                  twice a           



                                                  day            

 

     losartan losartan           No                       losartan           [facility]

[facility]



     100 mg 100 mg                                    100 mg           Family



     tablet Take tablet Take                                    tablet          

 [facility]



     1 tablet 1 tablet                                    Take 1           e



     every day every day                                    tablet           



     by oral by oral                                    every day           



     route. route.                                    by oral           



                                                  route.           

 

     metformin metformin                                  metformin           

[facility]



     1,000 mg 1,000 mg                                    1,000 mg           [facility]

[facility]



     tablet Take tablet Take                                    tablet          

 [facility]



     1 tablet 1 tablet                                    Take 1           e



     twice a day twice a day                                    tablet          

 



     by oral by oral                                    twice a           



     route. route.                                    day by           



                                                  oral           



                                                  route.           

 

     Novolog Novolog           No                       Novolog           [facility]

[facility]



     Flexpen Flexpen                                    Flexpen           [facility]



     U-100 U-100                                    U-100           [facility]



     Insulin per Insulin per                                    Insulin         

  e



     Endo 15 Endo 15                                    per Endo           



     units units                                    15 units           



     before before                                    before           



     meals meals                                    meals           

 

     pantoprazol pantoprazol           No                       pantoprazo      

     [facility]



     e 40 mg e 40 mg                                    le 40 mg           [facility]

y



     tablet,luigi tablet,luigi                                    tablet,del      

     [facility]



     yed release yed release                                    ayed           e



     Take 1 Take 1                                    release           



     tablet tablet                                    Take 1           



     every day every day                                    tablet           



     by oral by oral                                    every day           



     route as route as                                    by oral           



     needed. needed.                                    route as           



                                                  needed.           

 

     ranolazine ranolazine           No                       ranolazine        

   [facility]



      mg  mg                                     mg           

Family



     tablet,exte tablet,exte                                    tablet,ext      

     [facility]



     nded nded                                    ended           e



     release,12 release,12                                    release,12        

   



     hr Take 1 hr Take 1                                    hr Take 1           



     tablet tablet                                    tablet           



     twice a day twice a day                                    twice a         

  



     by oral by oral                                    day by           



     route. route.                                    oral           



                                                  route.           

 

     sacubitril sacubitril           No             1    BID  sacubitril        

   [facility]



     97   97                                      97             Family



     mg-valsarta mg-valsarta                                    mg-valsart      

     [facility]



     n 103 mg n 103 mg                                    an 103 mg           e



     tablet Take tablet Take                                    tablet          

 



     1 tablet 1 tablet                                    Take 1           



     twice a day twice a day                                    tablet          

 



     by oral by oral                                    twice a           



     route. route.                                    day by           



                                                  oral           



                                                  route.           

 

     spironolact spironolact           No                       spironolac      

     [facility]



     one 25 mg one 25 mg                                    tone 25 mg          

 Family



     tablet TAKE tablet TAKE                                    tablet          

 [facility]



     1 TABLET 1 TABLET                                    TAKE 1           e



     DAILY DAILY                                    TABLET           



                                                  DAILY           

 

     ticagrelor ticagrelor           No                       ticagrelor        

   [facility]



     Per Card Per Card                                    Per Card           [facility]

[facility]



                                                                 [facility]



                                                                 e

 

     tramadol 50 tramadol 50           No                       tramadol        

   [facility]



     mg tablet mg tablet                                    50 mg           [facility]

ly



     TAKE 1 TAKE 1                                    tablet           [facility]



     TABLET BY TABLET BY                                    TAKE 1           e



     MOUTH EVERY MOUTH EVERY                                    TABLET BY       

    



     6 HOURS AS 6 HOURS AS                                    MOUTH           



     NEEDED NEEDED                                    EVERY 6           



                                                  HOURS AS           



                                                  NEEDED           

 

     Trulicity Trulicity           No                       Trulicity           

[facility]



     [facility]                                    [facility]           [facility]



                                                                 [facility]



                                                                 e







Immunizations







           Ordered Immunization Filled Immunization Date       Status     Commen

ts   Source



           Name       Name                                        

 

           zoster recombinant zoster recombinant 2021 Completed           

  [facility]



                                 09:48:00                         Practice

 

           SARS-COV-2 (COVID-19) SARS-COV-2 (COVID-19) 2021 Completed     

        [facility]



           vaccine, UNSPECIFIED vaccine, UNSPECIFIED 00:00:00                   

      Practice

 

           SARS-COV-2 (COVID-19) SARS-COV-2 (COVID-19) 2021 Completed     

        [facility]



           vaccine, UNSPECIFIED vaccine, UNSPECIFIED 00:00:00                   

      Practice

 

           zoster recombinant zoster recombinant 2020 Completed           

  [facility]



                                 10:45:00                         Practice

 

           influenza, high-dose, influenza, high-dose, 2020 Completed     

        [facility]



           quadrivalent quadrivalent 11:13:00                         Practice

 

           influenza, high dose influenza, high dose 2019-09-10 Completed       

      [facility]



           seasonal   seasonal   10:00:00                         Practice

 

           influenza, high dose influenza, high dose 2018 Completed       

      [facility]



           seasonal   seasonal   11:52:00                         Practice

 

           pneumococcal pneumococcal 2018 Completed             [facility]



           polysaccharide PPV23 polysaccharide PPV23 11:52:00                   

      Practice

 

           pneumococcal pneumococcal 2017 [facility]



           conjugate PCV 13 conjugate PCV 13 00:00:00                         Pr

actice

 

           influenza, influenza, 2017 [facility]



           unspecified unspecified 00:00:00                         Practice



           formulation formulation                                  

 

           Tdap       Tdap       2015 Completed             [facility]



                                 00:00:00                         Practice







Vital Signs







             Vital Name   Observation Time Observation Value Comments     Source

 

             WEIGHT       2022 06:00:00 104.418 kg                

 

             WEIGHT       2022-08-15 05:36:00 103.148 kg                

 

             WEIGHT       2022 06:00:00 103.148 kg                

 

             WEIGHT       2022 06:00:00 102.876 kg                

 

             WEIGHT       2022 06:00:00 101.969 kg                

 

             WEIGHT       2022 06:00:00 102.649 kg                

 

             WEIGHT       2022-08-10 06:00:00 102.83 kg                 

 

             WEIGHT       2022 06:00:00 102.558 kg                

 

             WEIGHT       2022 06:00:00 99.791 kg                 

 

             WEIGHT       2022 06:00:00 99.791 kg                 

 

             WEIGHT       2022 06:00:00 100.744 kg                

 

             HEIGHT       2022 23:38:00 182.9 cm                  

 

             WEIGHT       2022 23:38:00 99.882 kg                 

 

             HEIGHT       2022 18:23:00 182.9 cm                  

 

             WEIGHT       2022 18:23:00 102.513 kg                

 

             WEIGHT       2022 06:00:00 104.418 kg                

 

             WEIGHT       2022-08-15 05:36:00 103.148 kg                

 

             WEIGHT       2022 06:00:00 103.148 kg                

 

             WEIGHT       2022 06:00:00 102.876 kg                

 

             WEIGHT       2022 06:00:00 101.969 kg                

 

             WEIGHT       2022 06:00:00 102.649 kg                

 

             WEIGHT       2022-08-10 06:00:00 102.83 kg                 

 

             WEIGHT       2022 06:00:00 102.558 kg                

 

             WEIGHT       2022 06:00:00 99.791 kg                 

 

             WEIGHT       2022 06:00:00 99.791 kg                 

 

             WEIGHT       2022 06:00:00 100.744 kg                

 

             HEIGHT       2022 23:38:00 182.9 cm                  

 

             WEIGHT       2022 23:38:00 99.882 kg                 

 

             HEIGHT       2022 18:23:00 182.9 cm                  

 

             WEIGHT       2022 18:23:00 102.513 kg                

 

             Body height  2022 15:25:00 181.6 cm                  [facility]

h

 

             Body weight  2022 15:25:00 112.492 kg                [facility]

h

 

             BMI          2022 15:25:00 34.11 kg/m2               [facility]

h

 

             Body height  2022 15:25:00 181.6 cm                  [facility]

h

 

             Body weight  2022 15:25:00 112.492 kg                [facility]

h

 

             BMI          2022 15:25:00 34.11 kg/m2               [facility]

h

 

             Body height  2021-12-10 15:40:00 181.6 cm                  [facility]

h

 

             Body weight  2021-12-10 15:40:00 112.492 kg                [facility]

h

 

             BMI          2021-12-10 15:40:00 34.11 kg/m2               [facility]

h

 

             Body height  2021 15:03:00 181.6 cm                  [facility]

h

 

             Body weight  2021 15:03:00 112.492 kg                [facility]

[facility]

 

             BMI          2021 15:03:00 34.11 kg/m2               [facility]

[facility]

 

             Body height  2021 15:08:00 181.6 cm                  [facility]

h

 

             Body weight  2021 15:08:00 112.492 kg                [facility]

[facility]

 

             BMI          2021 15:08:00 34.11 kg/m2               [facility]

[facility]

 

             Body height  2021 14:43:00 181.6 cm                  [facility]

h

 

             Body weight  2021 14:43:00 112.492 kg                [facility]

[facility]

 

             BMI          2021 14:43:00 34.11 kg/m2               [facility]

[facility]

 

             Body height  2021-10-15 14:03:00 181.6 cm                  [facility]

h

 

             Body weight  2021-10-15 14:03:00 112.492 kg                [facility]

[facility]

 

             BMI          2021-10-15 14:03:00 34.11 kg/m2               [facility]

h

 

             BP Diastolic 2021 00:00:00 71 mm[Hg]                 [facility]



                                                                 [facility]

 

             Height       2021 00:00:00 70 [in_i]                 [facility] 

Family



                                                                 Practice

 

             BMI (Body Mass 2021 00:00:00 36.6 kg/m2                [facility]

[facility]



             Index)                                              Practice

 

             BP Systolic  2021 00:00:00 123 mm[Hg]                [facility]



                                                                 [facility]

 

             Body Weight  2021 00:00:00 255.2 [lb_av]              [facility]

 Family



                                                                 Practice

 

             Systolic blood 2021 08:20:00 130 mm[Hg]                Un[facility]

sity of



             pressure                                            [facility]

 

             Diastolic blood 2021 08:20:00 65 mm[Hg]                 Unive

rsity of



             pressure                                            [facility]

 

             Heart rate   2021 08:20:00 80 /min                   Universi

ty of



                                                                 [facility]

 

             Body temperature 2021 08:20:00 37.06 Roxie                 [facility]



                                                                 [facility]

 

             Respiratory [facility] 2021 08:20:00 17 /min                   [facility]

 

             Body height  2021 08:20:00 182.9 cm                  [facility]

[facility]

 

             Body weight  2021 08:20:00 111.131 kg                [facility]

[facility]

 

             BMI          2021 08:20:00 33.23 kg/m2               Universi

[facility]

 

             Oxygen saturation in 2021 08:20:00 98 /min                   

[facility]



             Arterial blood by                                        [facility]

[facility]



             Pulse oximetry                                        [facility]

 

             BP Diastolic 2021 00:00:00 68 mm[Hg]                 [facility]



                                                                 [facility]

 

             Height       2021 00:00:00 70 [in_i]                 [facility]



                                                                 [facility]

 

             BMI (Body Mass 2021 00:00:00 35.9 kg/m2                [facility]

[facility]



             Index)                                              Practice

 

             BP Systolic  2021 00:00:00 149 mm[Hg]                [facility]



                                                                 [facility]

 

             Body Weight  2021 00:00:00 250.4 [lb_av]              [facility]



                                                                 [facility]

 

             BP Diastolic 2020 00:00:00 78 mm[Hg]                 [facility]



                                                                 [facility]

 

             Height       2020 00:00:00 70 [in_i]                 [facility]



                                                                 [facility]

 

             BMI (Body Mass 2020 00:00:00 33.4 kg/m2                [facility]

[facility]



             Index)                                              Practice

 

             BP Systolic  2020 00:00:00 120 mm[Hg]                [facility]



                                                                 [facility]

 

             Body Weight  2020 00:00:00 232.8 [lb_av]              [facility]



                                                                 [facility]

 

             BP Diastolic 2020 00:00:00 73 mm[Hg]                 [facility]



                                                                 [facility]

 

             Height       2020 00:00:00 70 [in_i]                 [facility]



                                                                 [facility]

 

             BMI (Body Mass 2020 00:00:00 35 kg/m2                  [facility]

[facility]



             Index)                                              Practice

 

             BP Systolic  2020 00:00:00 123 mm[Hg]                [facility]



                                                                 [facility]

 

             Body Weight  2020 00:00:00 244 [lb_av]               [facility]



                                                                 [facility]

 

             HEIGHT       2020 00:00:00 182.9 cm                  

 

             WEIGHT       2020 00:00:00 105.9 kg                  

 

             HEIGHT       2020 00:00:00 182.9 cm                  

 

             WEIGHT       2020 00:00:00 105.9 kg                  

 

             BP Diastolic 2020 00:00:00 73 mm[Hg]                 [facility]

 

             Height       2020 00:00:00 70 [in_i]                 [facility]



                                                                 [facility]

 

             BMI (Body Mass 2020 00:00:00 34.8 kg/m2                [facility]

[facility]



             Index)                                              Practice

 

             BP Systolic  2020 00:00:00 153 mm[Hg]                [facility]



                                                                 [facility]

 

             Body Weight  2020 00:00:00 242.6 [lb_av]              [facility]



                                                                 [facility]

 

             BP Diastolic 2019 00:00:00 80 mm[Hg]                 [facility]



                                                                 [facility]

 

             Height       2019 00:00:00 70 [in_i]                 [facility]



                                                                 [facility]

 

             BMI (Body Mass 2019 00:00:00 35.2 kg/m2                [facility]

[facility]



             Index)                                              Practice

 

             BP Systolic  2019 00:00:00 128 mm[Hg]                [facility]



                                                                 [facility]

 

             Body Weight  2019 00:00:00 245 [lb_av]               [facility]



                                                                 [facility]

 

             BP Diastolic 2019-09-10 00:00:00 80 mm[Hg]                 [facility]



                                                                 [facility]

 

             Height       2019-09-10 00:00:00 70 [in_i]                 [facility]



                                                                 [facility]

 

             BMI (Body Mass 2019-09-10 00:00:00 35.1 kg/m2                [facility]

[facility]



             Index)                                              Practice

 

             BP Systolic  2019-09-10 00:00:00 140 mm[Hg]                [facility]



                                                                 [facility]

 

             Body Weight  2019-09-10 00:00:00 244.4 [lb_av]              [facility]



                                                                 Practice

 

             BP Diastolic 2019 00:00:00 84 mm[Hg]                 [facility] 

[facility]



                                                                 Practice

 

             Height       2019 00:00:00 70 [in_i]                 [facility] 

[facility]



                                                                 Practice

 

             BMI (Body Mass 2019 00:00:00 34.9 kg/m2                [facility]

e [facility]



             Index)                                              Practice

 

             BP Systolic  2019 00:00:00 132 mm[Hg]                [facility] 

[facility]



                                                                 Practice

 

             Body Weight  2019 00:00:00 243.4 [lb_av]              [facility]

 [facility]



                                                                 Practice

 

             BP Diastolic 2019 00:00:00 82 mm[Hg]                 [facility]



                                                                 Practice

 

             Height       2019 00:00:00 70 [in_i]                 [facility]



                                                                 Practice

 

             BMI (Body Mass 2019 00:00:00 33.9 kg/m2                [facility]



             Index)                                              Practice

 

             BP Systolic  2019 00:00:00 142 mm[Hg]                [facility] 

[facility]



                                                                 Practice

 

             Body Weight  2019 00:00:00 236.6 [lb_av]              [facility]

 [facility]



                                                                 Practice

 

             BP Diastolic 2019 00:00:00 68 mm[Hg]                 [facility]



                                                                 Practice

 

             Height       2019 00:00:00 72 [in_i]                 [facility]



                                                                 Practice

 

             BMI (Body Mass 2019 00:00:00 32.1 kg/m2                [facility] Family



             Index)                                              Practice

 

             BP Systolic  2019 00:00:00 122 mm[Hg]                [facility] 

[facility]



                                                                 Practice

 

             Body Weight  2019 00:00:00 236.4 [lb_av]              [facility]

 [facility]



                                                                 Practice

 

             Body mass index 2021 08:37:00 34.13 kg/m2               [facility]



             (BMI) [Ratio]                                        

 

             Body height  2021 08:37:00 71.5 [in_us]              [facility]

[facility]

 

             Weight       2021 08:37:00 248.2 [lb_av]              [facility]

[facility]







Procedures







                Procedure       Date / Time     Performing Clinician Source



                                Performed                       

 

                [place] ARTHROCENTESIS 2021-12-10 19:22:48 Pasha Wilder    [facility]



                ASPIR&/INJ MAJOR JT/BURSA                                 



                W/O US                                          

 

                XR HIP 2 OR 3 VW RIGHT 2021-12-10 15:56:01 [facility]

 

                ARTHROCENTESIS ASPIR&/INJ 2021 15:38:48 [facility]



                MAJOR JT/BURSA W/O US                                 



                BILATERAL                                       

 

                ARTHROCENTESIS ASPIR&/INJ 2021 15:15:27 [facility]



                MAJOR JT/BURSA W/O US                                 



                BILATERAL                                       

 

                ARTHROCENTESIS ASPIR&/INJ 2021 14:39:41 [facility]



                MAJOR JT/BURSA W/O US                                 



                BILATERAL                                       

 

                [place] REMV EXT CANAL FOREIGN 2021 09:17:00 Shakira Marcellosruthi GLADYS TORRES

[facility]



                BODY                                            Medical Branch

 

                NOTICE OF PRIVACY 2021 08:24:18 Doctor Unassigned, [facility]



                PRACTICES                       [place]         [facility]

 

                CONSENT/REFUSAL FOR 2021 08:23:53 Doctor Unassigned, Isaac

[facility]



                DIAGNOSIS AND TREATMENT                 [place]         Medical 

Branch

 

                Cardiac Catheterization 2020 00:00:00                 [facility] Family



                                                                [facility]

 

                Cardiac Catheterization 2020 00:00:00                 [facility]

 

                Cardiac Catheterization 2019 00:00:00                 [facility]

 

                X-RAY OF CHEST 2 VIEW 2019 00:00:00                 [facility]



                                                                Practice

 

                Angioplasty     2018 00:00:00                 [facility] Meghna white



                                                                [facility]

 

                Cardiac Catheterization 2018 00:00:00                 [facility]

 

                Vascular Surgery 2018 00:00:00                 [place]

 

                Neurosurgery (Brain) 2012 00:00:00                 [facility]

 

                [facility] 2008 [facility]00:00                 [facility]



                                                                P[facility]

 

                Vasectomy                                       [facility]

 

                Tooth Root Removal                                 [facility] Famil

y



                                                                Practice

 

                Tonsilectomy/adenoids                                 [facility]

 

                Procedure on Spine                                 [facility] Famil

y



                                                                Practice







Plan of Care







             Planned Activity Planned Date Details      Comments     Source

 

             Future Scheduled Test 2025   DTAP/TDAP/TD              [facility]



                          00:00:00     VACCINES (2 - Td or              Medical 

Center



                                       Tdap) [code =              



                                       DTAP/TDAP/TD              



                                       VACCINES (2 - Td or              



                                       Tdap)]                    

 

             Future Scheduled Test 2021   INFLUENZA VACCINE              C

[facility]



                          00:00:00     (#1) [code =              Medical Center



                                       INFLUENZA VACCINE              



                                       (#1)]                     

 

             Diagnostic Test 2021   hemoglobin A1C,              [facility]

amily



             Pending      00:00:00     fingerstick [code =              Practice



                                       hemoglobin A1C,              



                                       fingerstick]              

 

             Future Scheduled Test 2021   DEPRESSION SCREENING            

  [facility]



                          00:00:00     (12+) [code =              Medical Center



                                       DEPRESSION SCREENING              



                                       (12+)]                    

 

             Future Scheduled Test 2021   FALLS RISK SCREENING            

  [facility]



                          00:00:00     [code = FALLS RISK              Medical C

enter



                                       SCREENING]                

 

             Future Scheduled Test 2019   MEDICARE ANNUAL              [facility]



                          00:00:00     WELLNESS (YEAR 2 or              Medical 

Center



                                       FIRST YEAR if no              



                                       IPPE) [code =              



                                       MEDICARE ANNUAL              



                                       WELLNESS (YEAR 2 or              



                                       FIRST YEAR if no              



                                       IPPE)]                    

 

             Future Scheduled Test 2003   SHINGLES VACCINES (1            

  [facility]



                          00:00:00     of 2) [code =              Medical Center



                                       SHINGLES VACCINES (1              



                                       of 2)]                    

 

             Future Scheduled Test 1971   [facility]ITIS C               [facility]



                          00[facility]00:00     SCREENING [code =              Medical 

nter



                                       HEPATITIS C               



                                       SCREENING]                

 

             Future Scheduled Test 1965   COVID-19 VACCINE (1)            

  [facility]



                          00:00:00     [code = COVID-19              Medical Debra

ter



                                       VACCINE (1)]              

 

             Future Scheduled Test 1953   [facility]              [facility]



                          00:00:00     malignant neoplasm              Medical C

enter



                                       of colon (procedure)              



                                       [code = 850355072]              

 

             [facility]







Encounters







        Start   End     Encounter Admission Attending Care    Care    Encounter 

Source



        Date/Time Date/Time Type    Type    Clinicians Facility Department ID   

   

 

        2022         Outpatient                 [facility]     [id number]

 [place]



        13:38:17                                                 [id number] [facility]

 

        [facility]     [id number]

 [place]



        09:56:30                         PASHA                   [id number] [facility]

 

        2022-06-15         [facility]         [facility]     [id number]

 [place]



        14:42:50                         PASHA                   [id number] [facility]

 

        2022         [facility]         [facility]     [id number] 

[place]



        [facility]

 

        2021-12-10         Outpatient                 [facility]     [id number] 

[place]



        09:46:01                                                         [facility]

 

        2021         [facility]         [facility]     [id number] 

[place]



        [facility]:20[facility]24                         [facility]

 

        2022 Inpatient ER      KORY, SLE    Emergency [id number]

[id number] [facility]



        18:13:00 13:23:00                 YODIT                             

 

        2022 Outpatient                 BCM     BCYOLANDA     [id number]

4 [facility]



        00:00:00 23:59:00                                                 Sofy

 

        [id number]

440 [place]



        10:15:00 11:11:16 Visit           Pasha ONTIVEROS [id number]         H

[facility]



                                                MEDICAL [id number]         



                                                PLAZA 6 [phone]         



                                                        5               

 

        2022 Office          Wilder  [facility] [id number]

227 [place]



        09:45:00 10:01:58 Visit           Pasha ONTIVEROS [id number]         H

[facility]



                                                MEDICAL [id number]         



                                                PLAZA 2 [phone]         



                                                        5               

 

        2021 Outpatient         Bui_Q   VFP     VFP     [id number]

202 [place]



        03:46:00 03:46:00                                         [id number]   Family



                                                                        Practic



                                                                        e

 

        2021 Outpatient         Bui_Q   VFP     VFP     [id number]

202 [place]



        03:46:00 03:46:00                                         [id number]   Family



                                                                        Practic



                                                                        e

 

        2021 Outpatient         Bui_Q   VFP     VFP     [id number]

202 [place]



        03:46:00 03:46:00                                         [id number]   Family



                                                                        Practic



                                                                        e

 

        2021-12-10 2021-12-10 Office          Wilder  [facility] [id number]

083 [place]



        09:45:00 10:51:22 Visit           Pasha ONTIVEROS [id number]         H

[facility]



                                                MEDICAL [id number]         



                                                PLAZA 6 [id number]         



                                                        5               

 

        2021 Outpatient         Bui_Q   VFP     VFP     [id number]

202 [place]



        11:28:00 11:28:00                                         [id number]   Family



                                                                        Practic



                                                                        e

 

        2021 Outpatient         Bui_Q_WAGDN VFP     VFP     335

429-202 [facility]



        11:28:00 11:28:00                 U                       [id number]   Family



                                                                        Practic



                                                                        e

 

        2021 Outpatient         Bui_Q_WAGDN VFP     VFP     335

429-202 [facility]



        11:28:00 11:28:00                 U                       [id number]   Family



                                                                        Practic



                                                                        e

 

        2021 Outpatient         Bui_Q_WAGDN VFP     VFP     335

429-202 [place]



        11:28:00 11:28:00                 U                       [id number]   Family



                                                                        Practic



                                                                        e

 

        2021 Office          Tina  [facility] [id number]

332 [place]



        08:47:07 09:23:11 Visit           Pashatramaine ONTIVEROS 350.1.13.58         [facility]

[facility]



                                                MEDICAL [id number]         



                                                PLAZA 8 [id number]         



                                                        5               

 

        2021 Procedure         TINA  [facility] [id number]

[address] [place]



        09:02:14 09:17:14 Visit           PASHATRAMAINE ONITVEROS 350.1.13.58         [facility]

[facility]



                                                MEDICAL [id number]         



                                                PLAZA 8 [phone]         



                                                        5               

 

        2021 Office          Tina  [facility] [id number]

832 [place]



        09:35:28 10:11:06 Visit           Pasha ONTIVEROS 350.1.13.58         [facility]

[facility]



                                                MEDICAL [id number]         



                                                PLAZA 4 [id number]         



                                                        5               

 

        2021-10-28 2021-10-28 Outpatient         Bui_Q   VFP     VFP     [id number]

 [facility]



        12:52:00 12:52:00                                         [id number]   Family



                                                                        Practic



                                                                        e

 

        2021-10-28 2021-10-28 Outpatient         Bui_Q_WAGDN VFP     VFP     335

429 [facility]



        12:52:00 12:52:00                 U                       [id number]   Family



                                                                        Practic



                                                                        e

 

        2021-10-15 2021-10-15 Office          Tina  [facility] [id number]

061 [place]



        08:40:45 09:48:37 Visit           Pasha ONTIVEROS 350.1.13.58         [facility]

[facility]



                                                MEDICAL [id number]         



                                                PLAZA 6 [phone]         



                                                        5               

 

        2021 Outpatient         Bui_Q   VFP     VFP     [id number]

 [facility]



        06:32:00 06:32:00                                         [id number]   Family



                                                                        Practic



                                                                        e

 

        2021 Outpatient         Bui_Q   VFP     VFP     [id number]-

202 [facility]



        09:47:00 09:47:00                                         [id number]   Family



                                                                        Practic



                                                                        e

 

        2021 Michael Suh                 VFP     [place] -    [id number]

[address] [place] [address]



        00:00:00 00:00:00 MD Sharon:                         [facility]         Famil

y



                        [id number]                           Medical -         Practi

c



                        Shadow                          VM_HOU_Shad         e



                        [facility],                                           



                        [address],                                         



                        [place], [place]                                              



                        [address]                                         



                        , Ph.                                           



                        [phone]                                         

 

        2021 Outpatient         Bui_Q   VFP     VFP     [id number]-

 [facility]



        10:50:00 10:50:00                                         [id number]   Family



                                                                        Practic



                                                                        e

 

        2021 Emergency         [place], [facility]    [id number]

[address] [facility]



        03:34:00 04:24:00                 Blanche GRAHAM Hines 350.1.13.10         

[facility] 4.2.7.2.686         [facility]  [id number]         [address]

 

        2021 Emergency X               [facility]    ERT     [id number]

22 Univers



        03:21:00 03:21:00                                                 ity [facility]

 

        2021 Outpatient         Bui_Q_WAG VFP     VFP     [id number]

 [facility]



        01:44:00 01:44:00                                         [id number]   Family



                                                                        Practic



                                                                        e

 

        2021 Bo WILDER  [facility]     Orthopedics 722 [address] [place]



        09:00:00 09:00:00 t; PASHA WILDER M.D.         [facility]      

   Lauri



                        carine DUARTE M.D.                                            

 

        2021 Outpatient         Bui_Q   VFP     VFP     [id number] [facility]



        04:44:00 04:44:00                                         [id number]   Family



                                                                        Practic



                                                                        e

 

        2021 Outpatient         Bui_Q   VFP     VFP     [id number]-

 [facility]



        05:54:00 05:54:00                                         [id number]   Family



                                                                        Practic



                                                                        e

 

        2021 Michael Suh                 P     [place] -    [id number]

3 [facility]



        00:00:00 00:00:00 MD Sharon:                         [facility]         Famil

y



                        [id number]                           Medical -         Practi

c



                        Shadow                          VM_HOU_Shad         e



                        [address]                           [facility],                                           



                        [address],                                         



                        [place], [place]                                              



                        [address]                                         



                        , Ph.                                           



                        [phone]                                         

 

        2020 Outpatient         Bui_Q_WAG VFP     VFP     [id number]

 [facility]



        03:16:00 03:16:00                                         [id number]   Family



                                                                        Practic



                                                                        e

 

        2020 Outpatient         Bui_Q_WAG VFP     VFP     [id number]

202 [facility]



        03:16:00 03:16:00                                         [id number]   Family



                                                                        Practic



                                                                        e

 

        2020 Outpatient         Bui_Q   VFP     VFP     [id number]-

 [facility]



        11:20:00 11:20:00                                         [id number]   Family



                                                                        Practic



                                                                        e

 

        2020 Outpatient         Bui_Q   VFP     VFP     [id number] [facility]



        11:20:00 11:20:00                                         [id number]   Family



                                                                        Practic



                                                                        e

 

        2020 Outpatient         Bui_Q   VFP     VFP     [id number]-

 [facility]



        11:20:00 11:20:00                                         [id number]   Family



                                                                        Practic



                                                                        e

 

        2020 Outpatient         Bui_Q_WAG VFP     VFP     [id number]

 [facility]



        11:20:00 11:20:00                                         [id number]   Family



                                                                        Practic



                                                                        e

 

        2020-12-10 2020-12-10 Outpatient         Bui_Q_WAG VFP     VFP     [id number]

 [facility]



        02:28:00 02:28:00                                         [id number]   Family



                                                                        Practic



                                                                        e

 

        2020 Outpatient         Bui_Q_WAG VFP     VFP     [id number]

 Village



        11:36:00 11:36:00                                         [id number]   Family



                                                                        Practic



                                                                        e

 

        2020 Michael Suh                 VFP     [place] -    [id number]

8 [facility]



        00:00:00 00:00:00 MD Sharon:                         [facility]         Famil

y



                        6122                            Medical -         [facility]         



                        [address],                            (WAG)           



                        [place], [place]                                              



                        [address]                                         



                        , Ph.                                           



                        [phone]                                         

 

        2020 Outpatient         Bui_Q_WAG VFP     VFP     [id number]

 [facility]



        01:23:00 01:23:00                                         [id number]   Family



                                                                        Practic



                                                                        e

 

        2020 2020 Outpatient         Bui_Q_WAG VFP     VFP     [id number]

 [facility]



        10:19:00 10:19:00                                         [id number]   Family



                                                                        Practic



                                                                        e

 

        2020 Outpatient         Bui_Q   VFP     VFP     [id number] [facility]



        10:19:00 10:19:00                                         [id number]   Family



                                                                        Practic



                                                                        e

 

        2020 Outpatient         Bui_Q   VFP     VFP     [id number] [facility]



        06:26:00 06:26:00                                         [id number]   Family



                                                                        Practic



                                                                        e

 

        [facility]     Orthopedics 686

[address] [place]



        08:00:00 08:00:00 t; ROEL MELGAR,           - [facility]



                        CLINT MELGAR,         LOVE JARAMILLO M.D. M.D.                                            

 

        2020 Outpatient         Bui_Q_WAG VFP     VFP     [id number]

 [facility]



        11:21:00 11:21:00                                         [id number]   Family



                                                                        Practic



                                                                        e

 

        2020 Outpatient         Bui_Q   VFP     VFP     [id number] [facility]



        11:21:00 11:21:00                                         [id number]   Family



                                                                        Practic



                                                                        e

 

        2020 Outpatient         Bui_Q_WAG VFP     VFP     [id number]

 [facility]



        02:34:00 02:34:00                                         [id number]   Family



                                                                        Practic



                                                                        e

 

        2020 Outpatient         Bui_Q_WAG VFP     VFP     [id number]

 [facility]



        06:48:00 06:48:00                                         [id number]   Family



                                                                        Practic



                                                                        e

 

        2020 Outpatient         Bui_Q   VFP     VFP     [id number] [facility]



        10:18:00 10:18:00                                         [id number]   Family



                                                                        Practic



                                                                        e

 

        2020 Michael Suh                 VFP     [place] -    [id number]

1 [facility]



        00:00:00 00:00:00 MD Sharon:                         [facility]         Famil

y



                        6122                            Medical -         [facility] [place] [address],                            (WAG)           



                        [place],                                         



                        [place]                                              



                        [address]                                         



                        , Ph.                                           



                        [phone]                                         

 

        [facility]     [place]     [id number]

0 [place]



        09:45:00 09:45:00 t; Balta PALOMO ANDREW, ans ANDREW, M.D. M.D.                                            

 

        2020 Emergency ER              SLE    Emergency [id number]

2464 [facility]



        21:37:00 21:37:00                                                 

 

        2020 Appointmen         ROEL [facility]     [id number]

8 [place]



        09:45:00 09:45:00 t; Balta PALOMO ANDREW, ans ANDREW, M.D. M.D.                                            

 

        2020 Outpatient         Bui_Q   VFP     VFP     [id number]

 [facility]



        03:42:00 03:42:00                                         [id number]   Family



                                                                        Practic



                                                                        e

 

        2020 Appointmen         ROEL [facility]     Orthopedics 662

[address] [place]



        09:45:00 09:45:00 t; ROEL MELGAR,           [place] ans ANDREW, M.D. M.D.                                            

 

        2020 Appointmen         SRUTHIIAINLOLLY [facility]     Orthopedics 662

[address]165 [place]



        09:45:00 09:45:00 t; ROEL MELGAR,           CLINT Salmon II ans ANDREW, M.D. M.D.                                            

 

        2020 Appointmen         SRUTHISTEPH [facility]     Orthopedics 661

[address] [place]



        09:45:00 09:45:00 t; ROEL MELGAR,           [place] Nair II ans ANDREW, M.D. M.D.                                            

 

        2020-04-15 2020-04-15 Outpatient         Bui_Q_WAG VFP     VFP     [id number]

 [facility]



        04:44:00 04:44:00                                         [id number]   Family



                                                                        Practic



                                                                        e

 

        2020-04-15 2020-04-15 Outpatient         Bui_Q   VFP     VFP     [id number] [facility]



        04:44:00 04:44:00                                         [id number]   Family



                                                                        Practic



                                                                        e

 

        2020-04-15 2020-04-15 Outpatient         Bui_Q   VFP     VFP     [id number]

 [facility]



        04:44:00 04:44:00                                         [id number]   Family



                                                                        Practic



                                                                        e

 

        2020 Outpatient         Bui_Q_WAG VFP     VFP     [id number]

202 [facility]



        02:00:00 02:00:00                                         [id number]   Family



                                                                        Practic



                                                                        e

 

        2020 Michael Suh                 VFP     [place] -    2020

3 [facility]



        00:00:00 00:00:00 MD Sharon:                         [facility]         Famil

y



                        6122                            Medical [address]                                              



                        [address]                                         



                        , Ph.                                           



                        [phone]                                         

 

        2020 Outpatient         Bui_Q_WAG VFP     VFP     [id number]

 [facility]



        11:39:00 11:39:00                                         [id number]   Family



                                                                        Practic



                                                                        e

 

        2020 Outpatient         Bui_Q   VFP     VFP     [id number]-

202 [facility]



        11:39:00 11:39:00                                         [id number]   Family



                                                                        Practic



                                                                        e

 

        2020 Outpatient         Bui_Q   VFP     VFP     [id number]-

202 [facility]



        10:59:00 10:59:00                                         [id number]   Family



                                                                        Practic



                                                                        e

 

        2020 Outpatient         Bui_Q   VFP     VFP     [id number]-

202 [facility]



        02:24:00 02:24:00                                         [id number]   Family



                                                                        Practic



                                                                        e

 

        2020-02-10 2020-02-10 Outpatient         Bui_Q_WAG VFP     VFP     [id number]

9202 [facility]



        10:21:00 10:21:00                                         [id number]   Family



                                                                        Practic



                                                                        e

 

        2020 Outpatient         Bui_Q_WAG VFP     VFP     [id number]

202 [facility]



        12:23:00 12:23:00                                         [id number]   Family



                                                                        Practic



                                                                        e

 

        2020 Michael Suh                 VFP     [place] -    

[address] [place] [address]



        00:00:00 00:00:00 MD Sharon:                         [facility]         Famil

y



                        6122                            Medical [address]                            ([place], [place]                                              



                        [address]                                         



                        , Ph.                                           



                        [phone]                                         

 

        2020 [facility]         SRUTHILOLLY [facility]     Orthopedics 625

[address] [place]



        07:15:00 07:15:00 t; ROEL MELGAR,           - [place]         

CLINT Alex II ans ANDREW, M.D. M.D.                                            

 

        2020 Outpatient         Bui_Q   VFP     VFP     [id number] [facility]



        02:00:00 02:00:00                                         [id number]   Family



                                                                        Practic



                                                                        e

 

        2020 Outpatient         Bui_Q   VFP     VFP     [id number]-

 [facility]



        02:00:00 02:00:00                                         [id number]   Family



                                                                        Practic



                                                                        e

 

        2020 Outpatient         Bui_Q_WAG VFP     VFP     [id number]

202 [facility]



        03:19:00 03:19:00                                         [id number]   Family



                                                                        Practic



                                                                        e

 

        2020 Outpatient         Bui_Q   VFP     VFP     [id number] [facility]



        09:06:00 09:06:00                                         [id number]   Family



                                                                        Practic



                                                                        e

 

        2020 Outpatient         Bui_Q_WAG VFP     VFP     [id number]

 [facility]



        09:13:00 09:13:00                                         [id number]   Family



                                                                        Practic



                                                                        e

 

        2020 Appointmen         ROEL GRAF     Orthopedics 623

[address] [place]



        07:15:00 07:15:00 t; ROEL MELGAR,           - [place]         

CLINT Alex II ans ANDREW, M.D. M.D.                                            

 

        2020-01-15 2020-01-15 Appointmen         ROEL GRAF     Orthopedics 579

[address]68 [place]



        07:00:00 07:00:00 t; ROEL MELGAR,           - [place]         

CLINT Alex II ans ANDREW, M.D. M.D.                                            

 

        2019 Michael Espitiag                 [facility]     [place] -    [id number]

2 [facility]



        00:00:00 00:00:00 MD Sharon:                         [facility]

y



                        9430                            Medical -         Practi

meli Moyer,                         _MARYBETHU_Pear         e



                        [address],                         [facility],                                         



                        [place]                                              



                        [address]                                         



                        , Ph.                                           



                        [phone]                                         

 

        2019-10-16 2019-10-16 Appointmen         ROEL GRAF     [place]     [id number]

5 [place]



        07:30:00 07:30:00 t; Balta PALOMO ANDREW, ans ANDREW, M.D. M.D.                                            

 

        2019-10-10 2019-10-10 [facility]         ROEL [facility]     [id number]

1 [place]



        15:00:00 15:00:00 t; Balta PALOMO ANDREW, ans ANDREW, M.D. M.D.                                            

 

        2019-10-01 2019-10-01 [facility]         MIREYANG [facility]     Orthopedics 573

[address] [place]



        07:15:00 07:15:00 t; ROEL MELGAR,           [facility]         

CLINT Alex ans ANDREW, M.D. M.D.                                            

 

        2019-09-10 2019-09-10 Michael Suh                 [facility]     [place] -    [id number]

0 Village



        00:00:00 00:00:00 MD Sharon:                         Village         Famil

y



                        9430                            [facility],                         Practice -         e



                        [address],                         [facility]-[place]               



                        [place]                                              



                        [address]                                         



                        , Ph.                                           



                        [phone]                                         

 

        2019 Michael Suh                 [facility]     [place] -    [id number]

6 Village



        00:00:00 00:00:00 MD Sharon:                         Village         Famil

y



                        9430                            [facility],                         Practice -         e



                        [address],                         [facility]               



                        [place]                                              



                        [address]                                         



                        , Ph.                                           



                        [phone]                                         

 

        2019 [facility]         ROEL [facility]     Orthopedics 504

[address] [place]



        07:00:00 07:00:00 t; ROEL MELGAR,           at [place]        

 CLINT Alex ans ANDREW, M.D. M.D.                                            

 

        2019 [facility]     [place] -    [id number] 

[facility]



        00:00:00 00:00:00 [facility]         Family



                        Adejumo,                         Family          Practic



                        MD: 9430                         Practice -         e



                        [place],                         [facility]         



                        [address],                         d               



                        [place] Ontiver[place]                                              



                        [address]                                         



                        , Ph.                                           



                        [phone]                                         

 

        2019 [facility]         ROEL [facility]     [id number]

0 [place]



        07:00:00 07:00:00 t; Balta PALOMO ANDREW, ans ANDREW, M.D. M.D.                                            

 

        2019 Appointmen         ROEL [facility]     [id number]

4 [place]



        07:00:00 07:00:00 t; Balta PALOMO ANDREW, ans ANDREW, M.D. M.D.                                            

 

        2019 Appointmen         MIERYANG [facility]     Orthopedics 503

[address] [place]



        07:15:00 07:15:00 t; ROEL MELGAR           [facility], [facility]. M.D.                                            

 

        2019 Michael Suh                 [facility]     [place] -    [id number]

[address] [place] [address]



        00:00:00 00:00:00 MD Sharon:                         [facility]

y



                        9430                            [facility],                         [facility] -         e



                        [address],                         [facility]               



                        [place]                                              



                        [address]                                         



                        , Ph.                                           



                        [phone]                                         

 

        2018 Appointmen         ROEL [facility]     [id number]

0 [place]



        07:00:00 07:00:00 t; Balta PALOMO ANDREW, ans ANDREW, M.D. M.D.                                            

 

        2018 Appointmen         SRUTHI-LOLLY [facility]     [id number]

0 [place]



        07:45:00 07:45:00 t; Balta PALOMO ANDREW, ans ANDREW, M.D. M.D.                                            

 

        2018-10-31 2018-10-31 Appointmen         SRUTHI-LOLLY [facility]     Orthopedics 457

[address] [place]



        07:00:00 07:00:00 t; RO[facility]GAR           [facility]W, M.D. M.D.                                            

 

        2018 Appointmen         SRUTHI-LOLLY [facility]     Orthopedics 448

[address] [place]



        08:45:00 08:45:00 t; ROEL MELGAR           at [facility]



                        RAMÓN,           CLINTcarine MEDRANO M.D. M.D.                                            

 

        2018 Appointmen         ROEL [facility] [id number]

99 [place]



        08:45:00 08:45:00 t; [facility]         CLINT Benavides,         Orthopedics         carine JARAMILLO M.D. M.D.                                            







Results







           Test Description Test Time  Test Comments Results    Result     [facility]

e



                                                       Comments   

 

           TISSUE EXAM 2022            Surgical Pathology Report          

  



                      13:52:21              Case: [id number]            



                                            Authorizing Provider:            



                                            Angel Loredo MD            



                                            Collected: 2022            



                                            04:39 PM Ordering            



                                            Location: [address] Received:            



                                            2022 05:15 PM            



                                            Service Pathologist:            



                                            Crissy Jordan MD Specimen:            



                                            Retroperitoneum, CT            



                                            Guided Retroperitoneal            



                                            Mass Biopsy SOFT TISSUE,            



                                            RETROPERITONEUM, BIOPSY:-           

 



                                            DENSE FIBROSIS WITH FOCAL           

 



                                            ATYPICAL LYMPHOID            



                                            AGGREGATES (SEE COMMENT)            



                                            Signing Pathologist            



                                            Direct Phone Line:            



                                            [phone]           

 



                                            y signed by Crissy Jordan MD on            



                                            2022 at 1:52            



                                            PMPreliminary result            



                                            electronically signed by            



                                            Jailene Maria MD on            



                                            2022 at 11:41            



                                            AMSections show dense            



                                            fibrous tissue with focal           

 



                                            areas containing a            



                                            lymphoid infiltrate            



                                            comprised of small mature           

 



                                            lymphoid cells.            



                                            Immunohistochemical            



                                            studies highlight admixed           

 



                                            T and B lymphoid cells.            



                                            There is crush artifact            



                                            and diminished tissue in            



                                            deeper stained sections.            



                                            No definitive feature of            



                                            a hematolymphoid neoplasm           

 



                                            is identified; however,            



                                            the tissue is limited.            



                                            Clinical correlation and            



                                            close follow up are            



                                            recommended. Further            



                                            tissue sampling is            



                                            recommended, if            



                                            clinically indicated and            



                                            if there is high            



                                            suspicion for involvement           

 



                                            by a hematolymphoid            



                                            neoplasm, including            



                                            tissue submission for            



                                            flow cytometry and            



                                            cytogenetics for complete           

 



                                            assessment.The results of           

 



                                            this case were discussed            



                                            with Dr. Mcrae on            



                                            2022 at 12:06            



                                            PM.[id number]; [id number]; 88341 x            



                                            16; 95938;            



                                            [id number]            



                                            massRetroperitoneumA.            



                                            Retroperitoneum.Received            



                                            in formalin labeled with            



                                            the patient's name,            



                                            medical record number and           

 



                                            "retroperitoneal mass"            



                                            are multiple tan,            



                                            threadlike soft tissue            



                                            cores measuring up to 1.1           

 



                                            cm in greatest length,            



                                            which are submitted in            



                                            toto in A1.PATI Sauer, HT            



                                            (ASCP)Sections of the            



                                            retroperitoneal biopsies            



                                            show cores of tissue with           

 



                                            marked dense fibrosis,            



                                            with focal areas with an            



                                            infiltrate of small            



                                            mature lymphoid cells            



                                            with scant cytoplasm and            



                                            condensed nuclear            



                                            chromatin. There is crush           

 



                                            artifact in focal areas.            



                                            Immunohistochemical and            



                                            in situ hybridization            



                                            studies are performed            



                                            with appropriate            



                                            controls. CD3 and CD20            



                                            highlight admixed T and B           

 



                                            lymphocytes. CD10 appears           

 



                                            focally positive in            



                                            lymphoid cells. A subset            



                                            of lymphoid cells stains            



                                            positive for BCL2. PAX5            



                                            is weakly positive in            



                                            focal B cells. MUM1,            



                                            , CD21, CD23, and            



                                            Cyclin D1 stains are            



                                            negative. Ki-67,            



                                            kappa-LEN, and lambda-LEN           

 



                                            are non-contributory.            



                                            Cytokeratin Ae1/Ae3 is            



                                            negative. CD34 highlights           

 



                                            background vascular            



                                            channels. Desmin and            



                                            smooth muscle actin are            



                                            positive in focal            



                                            muscular tissue.The            



                                            interpretation of this            



                                            case included the use of            



                                            immunohistochemistry or            



                                            special stains.A1: CD3,            



                                            CD20, PAX5, CD5, CD10,            



                                            BCL6, BCL2, MUM1, ,            



                                            CD21, CD23, Cyclin D1,            



                                            Ki-67, Kappa-LEN,            



                                            Lambda-LEN, CD34, SMA,            



                                            Desmin, Ae1/Bg3Zcwgoht            



                                            Slides Examined: In-house           

 



                                            known positive controls            



                                            were evaluated along with           

 



                                            the test tissue. These            



                                            control slides run            



                                            alongside of the patients           

 



                                            sample show appropriate            



                                            staining. Internal            



                                            positive and negative            



                                            controls when available            



                                            are evaluated            



                                            Immunohistochemistry            



                                            technical testing was            



                                            performed at [facility],            



                                            Pathology Laboratory            



                                            where it was developed            



                                            and its performance            



                                            characteristics were            



                                            determined. It has not            



                                            been cleared or approved            



                                            by the U.S. Food and Drug           

 



                                            Administration. The FDA            



                                            has determined that such            



                                            clearance or approval is            



                                            not necessary. The test            



                                            is used for clinical            



                                            purposes. It should not            



                                            be regarded as            



                                            investigational or for            



                                            research. This laboratory           

 



                                            is certified under the            



                                            Clinical Laboratory            



                                            Improvement Amendments of           

 



                                            1988 (CLIA-88) as            



                                            qualified to perform high           

 



                                            complexity clinical            



                                            laboratory testing.[facility], Department of            



                                            Pathology, [address] [place] [address]            



                                            [address], Tel            



                                            [phone],            



                                            Department of Pathology,            



                                            [address] [place] [address], Tel            



                                            [phone],            



                                            Department of Pathology,            



                                            [address] [place] [address], Tel            



                                            [phone]            









                    POCT-GLUCOSE METER  2022 12:28:15 









                      Test Item  Value      Reference Range Interpretation Comme

nts









             POC-GLUCOSE METER (BEAKER) 247 mg/dL           H            :

 TESTED AT [facility] [address]



             (test code = 1538)                                        [facility]

X, [address]:



                                                                 /Techni

matilda ID = [id number] for



                                                                 Sarthak Guillen



POCT-GLUCOSE [id number] 08:39:59





             Test Item    Value        Reference Range Interpretation Comments

 

             POC-GLUCOSE METER 163 mg/dL           H            : TESTED A

T [facility] [address]



             (BEAKER) (test code =                                        [place]

[place],



             1538)                                               [id number]:



                                                                 /Techni

matilda ID



                                                                 = [id number] for Blanka Freedman



BASIC METABOLIC [id number] 04:47:06





             Test Item    Value        Reference Range Interpretation Comments

 

             SODIUM (BEAKER) 138 meq/L    136-145                   



             (test code = 381)                                        

 

             POTASSIUM    5.2 meq/L    3.5-5.1      H            



             (BEAKER) (test                                        



             code = 379)                                         

 

             CHLORIDE (BEAKER) 106 meq/L                        



             (test code = 382)                                        

 

             CO2 (BEAKER) 23 meq/L     22-29                     



             (test code = 355)                                        

 

             BLOOD UREA   39 mg/dL     7-21         H            



             NITROGEN (BEAKER)                                        



             (test code = 354)                                        

 

             CREATININE   2.37 mg/dL   0.57-1.25    H            



             (BEAKER) (test                                        



             code = 358)                                         

 

             GLUCOSE RANDOM 175 mg/dL           H            



             (BEAKER) (test                                        



             code = 652)                                         

 

             CALCIUM (BEAKER) 8.3 mg/dL    8.4-10.2     L            



             (test code = 697)                                        

 

             EGFR (BEAKER) 29                                      Interpretatio

n of eGFR



             (test code = mL/min/1.73                            values Stage De

scription



             1092)        sq m                                   Result G1 Leah

l or high



                                                                 >=90 G2 Mildly 

decreased



                                                                 60-89 G3a Mildl

y to



                                                                 moderately 45-5

9 G3b



                                                                 Moderately to s

everely



                                                                 30-44 G4 Severl

y decreased



                                                                 15-29 G5 Kidney

 failure



                                                                 <15Reported eGF

R is based



                                                                 on the CKD-EPI 





                                                                 equation that d

oes not use



                                                                 a race



                                                                 coefficientEsti

mated GFR



                                                                 is not as accur

ate as



                                                                 Creatinine Tamiko

latosha in



                                                                 predicting glom

erular



                                                                 filtration rate

. Estimated



                                                                 GFR is not appl

icable for



                                                                 dialysis patien

ts



 ID - JULIA SPARROWBC (HEMOGRAM ONLY)2022 04:17:48





             Test Item    Value        Reference Range Interpretation Comments

 

             WHITE BLOOD CELL COUNT (BEAKER) 15.4 K/ L    3.5-10.5     H        

    



             (test code = 775)                                        

 

             RED BLOOD CELL COUNT (BEAKER) 3.27 M/ L    4.63-6.08    L          

  



             (test code = 761)                                        

 

             HEMOGLOBIN (BEAKER) (test code = 8.7 GM/DL    13.7-17.5    L       

     



             410)                                                

 

             HEMATOCRIT (BEAKER) (test code = 27.6 %       40.1-51.0    L       

     



             411)                                                

 

             MEAN CORPUSCULAR VOLUME (BEAKER) 84.4 fL      79.0-92.2            

     



             (test code = 753)                                        

 

             MEAN CORPUSCULAR HEMOGLOBIN 26.6 pg      25.7-32.2                 



             (BEAKER) (test code = 751)                                        

 

             MEAN CORPUSCULAR HEMOGLOBIN CONC 31.5 GM/DL   32.3-36.5    L       

     



             (BEAKER) (test code = 752)                                        

 

             RED CELL DISTRIBUTION WIDTH 14.5 %       11.6-14.4    H            



             (BEAKER) (test code = 412)                                        

 

             PLATELET COUNT (BEAKER) (test 230 K/CU MM  150-450                 

  



             code = 756)                                         

 

             MEAN PLATELET VOLUME (BEAKER) 9.8 fL       9.4-12.4                

  



             (test code = 754)                                        

 

             NUCLEATED RED BLOOD CELLS 0 /100 WBC   0-0                       



             (BEAKER) (test code = 413)                                        



POCT-GLUCOSE [id number] 21:15:36





             Test Item    Value        Reference Range Interpretation Comments

 

             POC-GLUCOSE METER 365 mg/dL           H            : TESTED A

T [facility] [address]



             (DEEJULY) (test code =                                        [facility],



             153)                                               [id number]:



                                                                 /Techni

matilda ID



                                                                 = [id number] for HALEIGH VELÁZQUEZ



POCT-GLUCOSE METER2022-08-15 17:34:55





             Test Item    Value        Reference Range Interpretation Comments

 

             POC-GLUCOSE METER 431 mg/dL           HH           : Notified

 RN/MD:



             (CHARISSE) (test code =                                        TESTED

 AT [facility] [address]



             1538)                                               [facility],



                                                                 [id number]:



                                                                 /Techni

matilda ID



                                                                 = [id number] for DUGLAS KRAFT



CT, BIOPSY, ABDOMEN2022-08-15 16:04:00Reason for exam:-&gt;bilateral nephro 
ureteral tube placement - please discuss with urology Dr. torre with any 
questions. Brilinta held starting 8/7 PM Reason for exam:-&gt;biopsy of 
retroperitoneal mass/lymphadenopathy/fibrosis
************************************************************[facility]OX[facility]NE [facility]OTOMAYOR : 1955 Sex: 
M************************************************************FINAL REPORT 
PATIENT ID:  [id number] CT guided soft tissue mass biopsy History: bilateral 
nephro ureteral tube placement - please discuss with urology Dr. torre with any 
questions. Brilinta held starting 87PMbiopsy of retroperitoneal 
mass/lymphadenopathy/fibrosis Modality: CT, CT fluoroscopy Anesthesia: 1% 
lidocaine : Dr. Julius Mcrae Approach: Right retroperitoneal 
percutaneous Consent: Therisks, benefits, and alternatives to the procedure were
discussed with the patient. The patient expressed understanding and informed 
written consent was obtained. Time out: A pre-procedure time out wasperformed in
order to confirm the appropriate site of the procedure. Sedation: Moderate 
sedation wasadministered. 1 mg of Versed and 50 mcg of fentanyl IV was used for 
moderate sedation monitored under my direction. Total intra-service time of 
sedation was 1 minutes. The patient's vital signs were monitored throughout the 
procedure and recorded in the patient's medical record by the nurse. Technique: 
Dose modulation, iterative reconstruction, and/or weight-based adjustment of the
mA/kV was utilizedto reduce the radiation dose to as low as reasonably 
achievable.The patient was placed prone in the CT scanner. Retroperitoneal mass 
was localized using CT and CT fluoroscopy. After the usual sterile preparation 
and application of local anesthesia, a 17-gauge coaxial guide needle was 
advanced into right retroperitoneum using CT guidance. Using an 18-gauge Temno 
needle via the coaxial guide needle a total of 4 core biopsy specimens were 
obtained and sent to pathology. The needle was removed and a sterile dressing 
was applied. Disposition: The patient tolerated the procedure well, without 
immediate complications. The patient left CT in stable condition. Impression: 1.
Technically successful CT guided retroperitoneal mass biopsy with conscious 
sedation. Signed: Julius Mcrae Verified Date/Time: 08/15/2022 16:04:22 
Reading Location: [facility] Radiology Reading Room Electronically signed by: JULIUS MCRAE MD on 08/15/2022 04:04 PMPOCT-GLUCOSE METER2022-08-15 12:12:30





             Test Item    Value        Reference Range Interpretation Comments

 

             POC-GLUCOSE METER 212 mg/dL           H            : TESTED A

T [facility] [address]



             (BEAKER) (test code =                                        [facility]

[facility],



             1538)                                               [id number]:



                                                                 /Techni

matilda ID



                                                                 = [id number] for FA

DUGLAS PERALTA



POCT-GLUCOSE [id number] 08:57:21





             Test Item    Value        Reference Range Interpretation Comments

 

             POC-GLUCOSE METER 177 mg/dL           H            : TESTED A

T [facility] [address]



             (BEAKER) (test code =                                        [facility]

[facility],



             1538)                                               [id number]:



                                                                 /Techni

matilda ID



                                                                 = [id number] for FA

KATHRYN



                                                                 DUGLAS



BASIC METABOLIC [id number] 06:46:29





             Test Item    Value        Reference Range Interpretation Comments

 

             SODIUM (BEAKER) 136 meq/L    136-145                   



             (test code = 381)                                        

 

             POTASSIUM    4.6 meq/L    3.5-5.1                   



             (BEAKER) (test                                        



             code = 379)                                         

 

             CHLORIDE (BEAKER) 105 meq/L                        



             (test code = 382)                                        

 

             CO2 (BEAKER) 23 meq/L     22-29                     



             (test code = 355)                                        

 

             BLOOD UREA   34 mg/dL     7-21         H            



             NITROGEN (BEAKER)                                        



             (test code = 354)                                        

 

             CREATININE   2.20 mg/dL   0.57-1.25    H            



             (BEAKER) (test                                        



             code = 358)                                         

 

             GLUCOSE RANDOM 215 mg/dL           H            



             (BEAKER) (test                                        



             code = 652)                                         

 

             CALCIUM (BEAKER) 8.4 mg/dL    8.4-10.2                  



             (test code = 697)                                        

 

             EGFR (BEAKER) 32                                      Interpretatio

n of eGFR



             (test code = mL/min/1.73                            values Stage De

scription



             1092)        sq m                                   Result G1 Leah

l or high



                                                                 >=90 G2  Mildly

 decreased



                                                                 60-89 G3a Mildl

y to



                                                                 moderately 45-5

9 G3b



                                                                 Moderately to s

everely



                                                                 30-44 G4 Severl

y decreased



                                                                 15-29 G5 Kidney

 failure



                                                                 <15Reported eGF

R is based



                                                                 on the CKD-EPI 





                                                                 equation that d

oes not use



                                                                 a race



                                                                 coefficientEsti

mated GFR



                                                                 is not as accur

ate as



                                                                 Creatinine Tamiko reina in



                                                                 predicting glom

erular



                                                                 filtration rate

.



                                                                 Estimated GFR i

s not



                                                                 applicable for 

dialysis



                                                                 patients



 ID - PIA[facility] (HEMOGRAM ONLY)2022-08-15 04:16:05





             Test Item    Value        Reference Range Interpretation Comments

 

             WHITE BLOOD CELL COUNT (BEAKER) 14.2 K/ L    3.5-10.5     H        

    



             (test code = 775)                                        

 

             RED BLOOD CELL COUNT (BEAKER) 3.42 M/ L    4.63-6.08    L          

  



             (test code = 761)                                        

 

             HEMOGLOBIN (BEAKER) (test code = 9.4 GM/DL    13.7-17.5    L       

     



             410)                                                

 

             HEMATOCRIT (BEAKER) (test code = 28.9 %       40.1-51.0    L       

     



             411)                                                

 

             MEAN CORPUSCULAR VOLUME (BEAKER) 84.5 fL      79.0-92.2            

     



             (test code = 753)                                        

 

             MEAN CORPUSCULAR HEMOGLOBIN 27.5 pg      25.7-32.2                 



             (BEAKER) (test code = 751)                                        

 

             MEAN CORPUSCULAR HEMOGLOBIN CONC 32.5 GM/DL   32.3-36.5            

     



             (BEAKER) (test code = 752)                                        

 

             RED CELL DISTRIBUTION WIDTH 14.4 %       11.6-14.4                 



             (BEAKER) (test code = 412)                                        

 

             PLATELET COUNT (BEAKER) (test 230 K/CU MM  150-450                 

  



             code = 756)                                         

 

             MEAN PLATELET VOLUME (BEAKER) 10.1 fL      9.4-12.4                

  



             (test code = 754)                                        

 

             NUCLEATED RED BLOOD CELLS 0 /100 WBC   0-0                       



             (BEAKER) (test code = 413)                                        



POCT-GLUCOSE [id number] 21:25:45





             Test Item    Value        Reference Range Interpretation Comments

 

             POC-GLUCOSE METER 358 mg/dL           H            : TESTED A

T [facility] [address]



             (BEAKER) (test code =                                        [place],



             1538)                                               [address]:



                                                                 /Techni

matilda ID



                                                                 = [id number] for CONNER LY



POCT-GLUCOSE [id number] 17:24:21





             Test Item    Value        Reference Range Interpretation Comments

 

             POC-GLUCOSE METER 334 mg/dL           H            : Will Rep

eat Test:



             (BEAKER) (test code =                                        Mary Jane jerez RN/MD: TESTED



             )                                               AT [facility] [address]

[facility]



                                                                 [facility], [address]

30:



                                                                 /Techni

matilda ID



                                                                 = [id number] for AMY CONTRERAS



POCT-GLUCOSE [id number] 13:44:03





             Test Item    Value        Reference Range Interpretation Comments

 

             POC-GLUCOSE METER 206 mg/dL           H            : TESTED A

T [facility] [address]



             (BEAKER) (test code =                                        [facility],



             1538)                                               [address]:



                                                                 /Techni

matilda ID



                                                                 = [id number] for AMY CONTRERAS



POCT-GLUCOSE [id number] 13:43:23





             Test Item    Value        Reference Range Interpretation Comments

 

             POC-GLUCOSE METER 176 mg/dL           H            : TESTED A

T [facility] [address]



             (BEAKER) (test code =                                        [facility],



             153)                                               [address]:



                                                                 /Techni

matilda ID



                                                                 = [id number] for AMY CONTRERAS



BASIC METABOLIC [id number] 08:57:05





             Test Item    Value        Reference Range Interpretation Comments

 

             SODIUM (BEAKER) 139 meq/L    136-145                   



             (test code = 381)                                        

 

             POTASSIUM    4.3 meq/L    3.5-5.1                   



             (BEAKER) (test                                        



             code = 379)                                         

 

             CHLORIDE (BEAKER) 108 meq/L           H            



             (test code = 382)                                        

 

             CO2 (BEAKER) 22 meq/L     22-29                     



             (test code = 355)                                        

 

             BLOOD UREA   33 mg/dL     7-21         H            



             NITROGEN (BEAKER)                                        



             (test code = 354)                                        

 

             CREATININE   1.99 mg/dL   0.57-1.25    H            



             (BEAKER) (test                                        



             code = 358)                                         

 

             GLUCOSE RANDOM 149 mg/dL           H            



             (BEAKER) (test                                        



             code = 652)                                         

 

             CALCIUM (BEAKER) 8.3 mg/dL    8.4-10.2     L            



             (test code = 697)                                        

 

             EGFR (BEAKER) 36                                      Interpretatio

n of eGFR



             (test code = mL/min/1.73                            values Stage De

scription



             1092)        sq m                                   Result G1 Leah

l or high



                                                                 >=90 G2 Mildly 

decreased



                                                                 60-89 G3a Mildl

y to



                                                                 moderately 45-5

9 G3b



                                                                 Moderately to s

everely



                                                                 30-44 G4 Severl

y decreased



                                                                 15-29  G5 Kidne

y failure



                                                                 <15Reported eGF

R is based



                                                                 on the CKD-EPI 





                                                                 equation that d

oes not use



                                                                 a race



                                                                 coefficientEsti

mated GFR



                                                                 is not as accur

ate as



                                                                 Creatinine Tamiko

latosha in



                                                                 predicting glom

erular



                                                                 filtration rate

. Estimated



                                                                 GFR is not appl

icable for



                                                                 dialysis patien

ts



 ID - PIAYA [facility] (HEMOGRAM ONLY)2022 06:21:41





             Test Item    Value        Reference Range Interpretation Comments

 

             WHITE BLOOD CELL COUNT (BEAKER) 11.8 K/ L    3.5-10.5     H        

    



             (test code = 775)                                        

 

             RED BLOOD CELL COUNT (BEAKER) 3.28 M/ L    4.63-6.08    L          

  



             (test code = 761)                                        

 

             HEMOGLOBIN (BEAKER) (test code = 8.9 GM/DL    13.7-17.5    L       

     



             410)                                                

 

             HEMATOCRIT (BEAKER) (test code = 26.5 %       40.1-51.0    L       

     



             411)                                                

 

             MEAN CORPUSCULAR VOLUME (BEAKER) 80.8 fL      79.0-92.2            

     



             (test code = 753)                                        

 

             MEAN CORPUSCULAR HEMOGLOBIN 27.1 pg      25.7-32.2                 



             (BEAKER) (test code = 751)                                        

 

             MEAN CORPUSCULAR HEMOGLOBIN CONC 33.6 GM/DL   32.3-36.5            

     



             (BEAKER) (test code = 752)                                        

 

             RED CELL DISTRIBUTION WIDTH 14.3 %       11.6-14.4                 



             (BEAKER) (test code = 412)                                        

 

             PLATELET COUNT (BEAKER) (test 217 K/CU MM  150-450                 

  



             code = 756)                                         

 

             MEAN PLATELET VOLUME (BEAKER) 10.1 fL      9.4-12.4                

  



             (test code = 754)                                        

 

             NUCLEATED RED BLOOD CELLS 0 /100 WBC   0-0                       



             (BEAKER) (test code = 413)                                        



POCT-GLUCOSE [id number] 04:26:31





             Test Item    Value        Reference Range Interpretation Comments

 

             POC-GLUCOSE METER 292 mg/dL           H            : TESTED A

T [facility] [address]



             (BEAKER) (test code =                                        [place],



             1538)                                               [id number]:



                                                                 /Techni

matilda ID



                                                                 = [id number] for SA

MEENAKSHI



                                                                 CONNER



CT, [id number] 21:44:00Unlisted Reason for Exam - Click Yes and Enter 
Reason Below-&gt;YesUnlisted Reason for Exam-&gt;F.U size of RP mass s/p 
steroids. R PCN with minimal UOPIs this for enterography?-&gt;NoPlease 
specify:-&gt;RenalWill this procedure require oral contrast?-&gt;No
************************************************************[facility]CTOR : 1955 Sex: 
M************************************************************FINAL REPORT 
PATIENT ID: [id number] CT, ABDOMEN \T\ PELVIS, WITHOUT IV CONTRAST CLINICAL 
STATEMENT: Right-sided nephrostomy. COMPARISON: CT abdomen pelvis dated 
2022. Dose modulation, iterative reconstruction, and/or weight-based 
adjustment of the mA/kV was utilized to reduce the radiation dose to as low as 
reasonably achievable. FINDINGS: Visualized lung bases are within normal limits.
Liver, spleen, pancreas, gallbladder, adrenal glands are within normal limits. 
No biliary dilatation. No significanthydronephrosis. Bilateral nephrostomy is in
place. They are in appropriate position. No dilated loops of bowel to suggest 
obstruction. Mild amount of stool in the colon. The appendix is normal. No free
fluid or free air. Moderate retroperitoneal and pelvic lymphadenopathy, similar 
to prior study. No free fluid or free air. Bladder is unremarkable. IMPRESSION: 
Bilateral nephrostomy in place with resolution of previously seen 
hydronephrosis. No ascites. Moderate retroperitoneal lymphadenopathy is stable 
in appearance. Signed: Kellen Almanzaeport Verified Date/Time: 2022 
21:44:26 Electronicallysigned by: KELLEN ALMANZA on 2022 09:44 PMPOCT-
GLUCOSE [id number] 17:49:22





             Test Item    Value        Reference Range Interpretation Comments

 

             POC-GLUCOSE METER 302 mg/dL           H            : TESTED A

T [facility] [address]



             (BEAKER) (test code =                                        [facility],



             1538)                                               [address]:



                                                                 /Techni

matilda ID



                                                                 = [id number] for AMY CONTRERAS



POCT-GLUCOSE [id number] 15:09:04





             Test Item    Value        Reference Range Interpretation Comments

 

             POC-GLUCOSE METER 224 mg/dL           H            : TESTED A

T [facility] [address]



             (BEAKER) (test code =                                        [facility],



             1538)                                               [address]:



                                                                 /Techni

matilda ID



                                                                 = [id number] for AMY CONTRERAS



POCT-GLUCOSE [id number] 08:54:24





             Test Item    Value        Reference Range Interpretation Comments

 

             POC-GLUCOSE METER 187 mg/dL           H            : TESTED A

T [facility] [address]



             (BEAKER) (test code =                                        [facility],



             1538)                                               [address]:



                                                                 /Techni

matilda ID



                                                                 = [id number] for AMY CONTRERAS



BASIC METABOLIC [id number] 06:18:58





             Test Item    Value        Reference Range Interpretation Comments

 

             SODIUM (BEAKER) 134 meq/L    136-145      L            



             (test code = 381)                                        

 

             POTASSIUM    3.8 meq/L    3.5-5.1                   



             (BEAKER) (test                                        



             code = 379)                                         

 

             CHLORIDE (BEAKER) 103 meq/L                        



             (test code = 382)                                        

 

             CO2 (BEAKER) 23 meq/L     22-29                     



             (test code = 355)                                        

 

             BLOOD UREA   37 mg/dL     7-21         H            



             NITROGEN (BEAKER)                                        



             (test code = 354)                                        

 

             CREATININE   2.11 mg/dL   0.57-1.25    H            



             (BEAKER) (test                                        



             code = 358)                                         

 

             GLUCOSE RANDOM 244 mg/dL           H            



             (BEAKER) (test                                        



             code = 652)                                         

 

             CALCIUM (BEAKER) 8.0 mg/dL    8.4-10.2     L            



             (test code = 697)                                        

 

             EGFR (BEAKER) 34                                      Interpretatio

n of eGFR



             (test code = mL/min/1.73                            values Stage  D

escription



             1092)        sq m                                   Result G1 Leah

l or high



                                                                 >=90 G2 Mildly 

decreased



                                                                 60-89 G3a Mildl

y to



                                                                 moderately 45-5

9 G3b



                                                                 Moderately to s

everely



                                                                 30-44 G4 Severl

y decreased



                                                                 15-29 G5 Kidney

 failure



                                                                 <15Reported eGF

R is based



                                                                 on the CKD-EPI 





                                                                 equation that d

oes not use



                                                                 a race



                                                                 coefficientEsti

mated GFR



                                                                 is not as accur

ate as



                                                                 Creatinine Tamiko reina in



                                                                 predicting glom

erular



                                                                 filtration rate

. Estimated



                                                                 GFR is not appl

icable for



                                                                 dialysis patien

ts



 ID - PATIENCE [facility] (HEMOGRAM ONLY)2022 05:35:16





             Test Item    Value        Reference Range Interpretation Comments

 

             WHITE BLOOD CELL COUNT (BEAKER) 10.5 K/ L    3.5-10.5              

    



             (test code = 775)                                        

 

             RED BLOOD CELL COUNT (BEAKER) 3.24 M/ L    4.63-6.08    L          

  



             (test code = 761)                                        

 

             HEMOGLOBIN (BEAKER) (test code = 8.7 GM/DL    13.7-17.5    L       

     



             410)                                                

 

             HEMATOCRIT (BEAKER) (test code = 27.2 %       40.1-51.0    L       

     



             411)                                                

 

             MEAN CORPUSCULAR VOLUME (BEAKER) 84.0 fL      79.0-92.2            

     



             (test code = 753)                                        

 

             MEAN CORPUSCULAR HEMOGLOBIN 26.9 pg      25.7-32.2                 



             (BEAKER) (test code = 751)                                        

 

             MEAN CORPUSCULAR HEMOGLOBIN CONC 32.0 GM/DL   32.3-36.5    L       

     



             (BEAKER) (test code = 752)                                        

 

             RED CELL DISTRIBUTION WIDTH 14.6 %       11.6-14.4    H            



             (BEAKER) (test code = 412)                                        

 

             PLATELET COUNT (BEAKER) (test 186 K/CU MM  150-450                 

  



             code = 756)                                         

 

             MEAN PLATELET VOLUME (BEAKER) 10.0 fL      9.4-12.4                

  



             (test code = 754)                                        

 

             NUCLEATED RED BLOOD CELLS 0 /100 WBC   0-0                       



             (BEAKER) (test code = 413)                                        



POCT-GLUCOSE [id number] 21:15:58





             Test Item    Value        Reference Range Interpretation Comments

 

             POC-GLUCOSE METER 352 mg/dL           H            : TESTED A

T [facility] [address]



             (BEAKER) (test code =                                        [place],



             1538)                                               [address]:



                                                                 /Techni

matilda ID



                                                                 = [id number] for CONNER LY



POCT-GLUCOSE [id number] 17:48:20





             Test Item    Value        Reference Range Interpretation Comments

 

             POC-GLUCOSE METER 246 mg/dL           H            : TESTED A

T [facility] [address]



             (BEAKER) (test code =                                        [place],



             1538)                                               [address]:



                                                                 /Techni

matilda ID



                                                                 = [id number] for AMY CONTRERAS, NEPHROSTOGRAM, STENT, NEW [id number] 14:29:00Reason for exam:-
&gt;bilateral nephro ureteral tube placement - please discuss with urology Dr. torre with any questions. Brilinta held starting 8/7 PM Reason for exam:-
&gt;biopsy of retroperitoneal mass/lymphadenopathy/fibrosis
************************************************************[facility] : 1955 Sex: 
M************************************************************FINAL REPORT 
PATIENT ID: [id number] Bilateral percutaneous nephrostomy catheter placement 
Clinical History: Bilateral hydronephrosis. Modality: Sonography and fluoroscopy
 : Surendra Pena MD.Assistant: None. Sedation: Moderate sedation
was administered. 2 mg of Versed and 100 mcg of fentanyl IV was used for 
moderate sedation monitored under my direction. Total intra-service time of 
sedation was 60 minutes. The patient's vital signs were monitored throughout the
procedure and recorded in the patient's medical record by the nurse. Estimated 
Blood Loss: Less than 5 cc. Specimen: None. Fluoroscopy Time: 1.4 min.Reference 
Air Kerma (Ka, r): 4.2 mGy. Technique: Informed written consent was obtained. 
Discussion of risks, benefits, and alternatives were made with the patient. The 
patient expressed understanding and agreed to proceed. A universal timeout was 
performed prior to starting the procedure. All elements maximal sterile barrier 
technique was utilized for this procedure, including utilization of sterile 
scrub solution for skin prep, a large sterile sheet to cover the areas of the 
patient that were not prepped, and hand hygiene, mask, head covering, and 
sterile gown for performing radiologist and scrub technologist. Initial 
ultrasound images demonstrate mild to moderate right-sided hydronephrosis. 2% 
lidocaine was used for local anesthesia. Using ultrasound guidance, a 21-gauge 
Chiba needle was advanced into an inferior pole calyx. Injection of contrast 
return of urine confirmed intraluminal positioning. A 0.018 inch wire was 
advanced through the needle a curled within the pelvis. The Accustick sheath was
advanced over the wire and an Amplatz wire was advanced down the proximal u
reter. After a small skin incision was made, the soft tissue tract was dilated 
and a 8.5 French drainage catheter was advanced over wire with pigtail formed 
within the right renal pelvis. Contrast injection confirmed appropriate 
positioning. The catheter was fixed to the skin with silk suture and attached to
gravity drainage. A sterile dressing was applied. Initial ultrasound images 
demonstrate mild to moderate left-sided hydronephrosis. 2% lidocaine was used 
for local anesthesia. Using ultrasound guidance, a 21-gauge Chiba needle was 
advanced into an inferior pole calyx. Injection of contrast return of urine 
confirmed intraluminal positioning. A 0.018 inch wire was advanced through the 
needle a curled within the pelvis. The Accustick sheath was advanced over the 
wire and an Amplatz wire was advanced down the proximal ureter. After a small 
skin incision was made, the soft tissue tract was dilated and a 8.5 French 
drainage catheter was advanced over wire with pigtail formed within the left 
renalpelvis. Contrast injection confirmed appropriate positioning. The catheter 
was fixed to the skin with silk suture and attached to gravity drainage. A 
sterile dressing was applied. The patient toleratedthe procedure well without 
immediate complication. Please note initial percutaneous nephrostomy catheter 
placement are requested. However, given recent anticoagulation will defer 
conversion to nephroureterostomy catheters after output from nephrostomy 
catheters are noted to be clear. This can be performed as an outpatient. 
Impression: Successful and uncomplicated ultrasound/fluoroscopic guided bilater
al percutaneous nephrostomy catheter placement as above. Signed: Surendra Pena 
MDReport Verified Date/Time: 2022 14:29:34 Reading Location: [facility] P048 
Angio Body Reading Room Electronically signed by: SURENDRA PENA MD on 
2022 02:29 PMPOCT-GLUCOSE [id number] 12:41:19





             Test Item    Value        Reference Range Interpretation Comments

 

             POC-GLUCOSE METER 152 mg/dL           H            : TESTED A

T [facility] [address]



             (BEAKER) (test code =                                        [place],



             1538)                                               [id number]:



                                                                 /Techni

matilda ID



                                                                 = [id number] for AMY CONTRERAS



POCT-GLUCOSE [id number] 07:40:19





             Test Item    Value        Reference Range Interpretation Comments

 

             POC-GLUCOSE METER 149 mg/dL           H            : TESTED A

T [facility] [address]



             (BEAKER) (test code =                                        [place],



             1538)                                               [address]:



                                                                 /Techni

matilda ID



                                                                 = [id number] for AMY CONTRERAS



BASIC METABOLIC [id number] 04:18:36





             Test Item    Value        Reference Range Interpretation Comments

 

             SODIUM (BEAKER) 137 meq/L    136-145                   



             (test code = 381)                                        

 

             POTASSIUM    4.3 meq/L    3.5-5.1                   



             (BEAKER) (test                                        



             code = 379)                                         

 

             CHLORIDE (BEAKER) 106 meq/L                        



             (test code = 382)                                        

 

             CO2 (BEAKER) 24 meq/L     22-29                     



             (test code = 355)                                        

 

             BLOOD UREA   33 mg/dL     7-21         H            



             NITROGEN (BEAKER)                                        



             (test code = 354)                                        

 

             CREATININE   2.18 mg/dL   0.57-1.25    H            



             (BEAKER) (test                                        



             code = 358)                                         

 

             GLUCOSE RANDOM 241 mg/dL           H            



             (BEAKER) (test                                        



             code = 652)                                         

 

             CALCIUM (BEAKER) 8.4 mg/dL    8.4-10.2                  



             (test code = 697)                                        

 

             EGFR (BEAKER) 32                                      Interpretatio

n of eGFR



             (test code = mL/min/1.73                            values Stage De

scription



             1092)        sq m                                   Result G1 Leah

l or high



                                                                 >=90 G2 Mildly 

decreased



                                                                 60-89 G3a Mildl

y to



                                                                 moderately 45-5

9 G3b



                                                                 Moderately to s

everely



                                                                 30-44 G4 Severl

y decreased



                                                                 15-29 G5 Kidney

 failure



                                                                 <15Reported eGF

R is based



                                                                 on the CKD-EPI 

2021



                                                                 equation that d

oes not use



                                                                 a race



                                                                 coefficientEsti

mated GFR



                                                                 is not as accur

ate as



                                                                 Creatinine Tamiko reina in



                                                                 predicting glom

erular



                                                                 filtration rate

. Estimated



                                                                 GFR is not appl

icable for



                                                                 dialysis patien

ts



 ID - PATIENCE [id number] 04:05:10





             Test Item    Value        Reference Range Interpretation Comments

 

             PARTIAL THROMBOPLASTIN TIME 29.6 seconds 22.5-36.0                 



             (BEAKER) (test code = 760)                                        



CBC (HEMOGRAM ONLY)2022 03:54:48





             Test Item    Value        Reference Range Interpretation Comments

 

             WHITE BLOOD CELL COUNT (BEAKER) 14.4 K/ L    3.5-10.5     H        

    



             (test code = 775)                                        

 

             RED BLOOD CELL COUNT (BEAKER) 3.55 M/ L    4.63-6.08    L          

  



             (test code = 761)                                        

 

             HEMOGLOBIN (BEAKER) (test code = 9.8 GM/DL    13.7-17.5    L       

     



             410)                                                

 

             HEMATOCRIT (BEAKER) (test code = 29.1 %       40.1-51.0    L       

     



             411)                                                

 

             MEAN CORPUSCULAR VOLUME (BEAKER) 82.0 fL      79.0-92.2            

     



             (test code = 753)                                        

 

             MEAN CORPUSCULAR HEMOGLOBIN 27.6 pg      25.7-32.2                 



             (BEAKER) (test code = 751)                                        

 

             MEAN CORPUSCULAR HEMOGLOBIN CONC 33.7 GM/DL   32.3-36.5            

     



             (BEAKER) (test code = 752)                                        

 

             RED CELL DISTRIBUTION WIDTH 14.6 %       11.6-14.4    H            



             (BEAKER) (test code = 412)                                        

 

             PLATELET COUNT (BEAKER) (test 232 K/CU MM  150-450                 

  



             code = 756)                                         

 

             MEAN PLATELET VOLUME (BEAKER) 10.5 fL      9.4-12.4                

  



             (test code = 754)                                        

 

             NUCLEATED RED BLOOD CELLS 0 /100 WBC   0-0                       



             (BEAKER) (test code = 413)                                        



POCT-GLUCOSE [id number] 21:29:41





             Test Item    Value        Reference Range Interpretation Comments

 

             POC-GLUCOSE METER 315 mg/dL           H            : TESTED A

T [facility] [address]



             (BEAKER) (test code =                                        [facility]

[facility],



             1538)                                               [id number]:



                                                                 /Techni

matilda ID



                                                                 = [id number] for UL

[place] NOLASCO



POCT-GLUCOSE [id number] 17:17:55





             Test Item    Value        Reference Range Interpretation Comments

 

             POC-GLUCOSE METER 277 mg/dL           H            : TESTED A

T [facility] [address]



             (BEAKER) (test code =                                        [facility]

[facility],



             1538)                                               [address]:



                                                                 /Techni

matilda ID



                                                                 = [id number] for Blanka Freedman



POCT-GLUCOSE [id number] 08:32:22





             Test Item    Value        Reference Range Interpretation Comments

 

             POC-GLUCOSE METER 123 mg/dL           H            : TESTED A

T [facility] [address]



             (BEAKER) (test code =                                        [place],



             1538)                                               [address]:



                                                                 /Techni

matilda ID



                                                                 = [id number] for Blanka Freedman



BASIC METABOLIC [id number] 07:07:13





             Test Item    Value        Reference Range Interpretation Comments

 

             SODIUM (BEAKER) 137 meq/L    136-145                   



             (test code = 381)                                        

 

             POTASSIUM    3.9 meq/L    3.5-5.1                   



             (BEAKER) (test                                        



             code = 379)                                         

 

             CHLORIDE (BEAKER) 107 meq/L                        



             (test code = 382)                                        

 

             CO2 (BEAKER) 23 meq/L     22-29                     



             (test code = 355)                                        

 

             BLOOD UREA   34 mg/dL     7-21         H            



             NITROGEN (BEAKER)                                        



             (test code = 354)                                        

 

             CREATININE   2.15 mg/dL   0.57-1.25    H            



             (BEAKER) (test                                        



             code = 358)                                         

 

             GLUCOSE RANDOM 153 mg/dL           H            



             (BEAKER) (test                                        



             code = 652)                                         

 

             CALCIUM (BEAKER) 8.6 mg/dL    8.4-10.2                  



             (test code = 697)                                        

 

             EGFR (BEAKER) 33                                      Interpretatio

n of eGFR



             (test code = mL/min/1.73                            values Stage De

scription



             1092)        sq m                                   Result G1 Leah

l or high



                                                                 >=90 G2 Mildly 

decreased



                                                                 60-89 G3a Mildl

y to



                                                                 moderately 45-5

9 G3b



                                                                 Moderately to s

everely



                                                                 30-44 G4 Severl

y decreased



                                                                 15-29 G5 Kidney

 failure



                                                                 <15Reported eGF

R is based



                                                                 on the CKD-EPI 

2021



                                                                 equation that d

oes not use



                                                                 a race



                                                                 coefficientEsti

mated GFR



                                                                 is not as accur

ate as



                                                                 Creatinine Tamiko reina in



                                                                 predicting glom

erular



                                                                 filtration rate

. Estimated



                                                                 GFR is not appl

icable for



                                                                 dialysis patien

ts



 ID - JULIA [id number] 06:59:12





             Test Item    Value        Reference Range Interpretation Comments

 

             PARTIAL THROMBOPLASTIN TIME 29.7 seconds 22.5-36.0                 



             (BEAKER) (test code = 760)                                        



CBC (HEMOGRAM ONLY)2022 06:39:28





             Test Item    Value        Reference Range Interpretation Comments

 

             WHITE BLOOD CELL COUNT (BEAKER) 12.3 K/ L    3.5-10.5     H        

    



             (test code = 775)                                        

 

             RED BLOOD CELL COUNT (BEAKER) 3.59 M/ L    4.63-6.08    L          

  



             (test code = 761)                                        

 

             HEMOGLOBIN (BEAKER) (test code = 9.6 GM/DL    13.7-17.5    L       

     



             410)                                                

 

             HEMATOCRIT (BEAKER) (test code = 29.6 %       40.1-51.0    L       

     



             411)                                                

 

             MEAN CORPUSCULAR VOLUME (BEAKER) 82.5 fL      79.0-92.2            

     



             (test code = 753)                                        

 

             MEAN CORPUSCULAR HEMOGLOBIN 26.7 pg      25.7-32.2                 



             (BEAKER) (test code = 751)                                        

 

             MEAN CORPUSCULAR HEMOGLOBIN CONC 32.4 GM/DL   32.3-36.5            

     



             (BEAKER) (test code = 752)                                        

 

             RED CELL DISTRIBUTION WIDTH 14.5 %       11.6-14.4    H            



             (BEAKER) (test code = 412)                                        

 

             PLATELET COUNT (BEAKER) (test 213 K/CU MM  150-450                 

  



             code = 756)                                         

 

             MEAN PLATELET VOLUME (BEAKER) 10.2 fL      9.4-12.4                

  



             (test code = 754)                                        

 

             NUCLEATED RED BLOOD CELLS 0 /100 WBC   0-0                       



             (BEAKER) (test code = 413)                                        



POCT-GLUCOSE METER2022-08-10 21:49:30





             Test Item    Value        Reference Range Interpretation Comments

 

             POC-GLUCOSE METER 269 mg/dL           H            : TESTED A

T [facility] [address]



             (BEAKER) (test code =                                        [facility],



             153)                                               [address]:



                                                                 /Techni

matilda ID



                                                                 = [id number] for UL

LATJOELLE



                                                                 [place]



POCT-GLUCOSE METER2022-08-10 17:30:11





             Test Item    Value        Reference Range Interpretation Comments

 

             POC-GLUCOSE METER 288 mg/dL           H            : TESTED A

T [facility] [address]



             (BEAKER) (test code =                                        [facility],



             153)                                               [address]:



                                                                 /Techni

matilda ID



                                                                 = [id number] for Ayla gibbs



                                                                 Blanka



RAD, CHEST, 1 VIEW, NON DEPT2022-08-10 16:43:00Reason for exam:-&gt;coughShould 
this be performed at the bedside?-&gt;Yes
************************************************************[facility] : 1955 Sex: 
M************************************************************FINAL REPORT 
PATIENT ID: [id number] Exam: RAD, CHEST, 1 VIEW, NON DEPTDate: 8/10/2022 4:42 PM 
Indication: Cough Comparison: CXR of 2022 FINDINGS: Lines/Tubes:EKG leads 
overlie the chest. Lungs:The lungs are well inflated. No focal consolidation or 
pulmonary edema. Pleura:No pleural effusion. No pneumothorax. 
Heart/Mediastinum:The cardiomediastinal silhouette is normal in size and 
contour. Stable postoperative findings. Bones/Soft Tissues: No acute osseous 
injury. Unchanged sternotomy wires. Abdomen: No free air below the diaphragm. 
IMPRESSION:No focal pneumonia or pulmonary edema. Signed: Luiz CampMDReport 
Verified Date/Time: 08/10/2022 16:43:43 Electronically signed by: LUIZ CAMP MD
on 08/10/2022 04:43 PMPOCT-GLUCOSE METER2022-08-10 12:27:27





             Test Item    Value        Reference Range Interpretation Comments

 

             POC-GLUCOSE METER 292 mg/dL           H            : TESTED A

[facility]



             ([employer]) (test code =                                        [facility],



             1538)                                               [address]:



                                                                 /Techni

matilda ID



                                                                 = [id number] for Ch

Blanka gibbs



POCT-GLUCOSE [id number] 08:36:14





             Test Item    Value        Reference Range Interpretation Comments

 

             POC-GLUCOSE METER 133 mg/dL           H            : TESTED A

T [facility] [address]



             ([employer]) (test code =                                        [facility],



             1538)                                               [address]:



                                                                 /Techni

matilda ID



                                                                 = [id number] for Ch

Blanka gibbs



BASIC METABOLIC [id number] 05:09:19





             Test Item    Value        Reference Range Interpretation Comments

 

             SODIUM (BEAKER) 138 meq/L    136-145                   



             (test code = 381)                                        

 

             POTASSIUM    4.1 meq/L    3.5-5.1                   



             (BEAKER) (test                                        



             code = 379)                                         

 

             CHLORIDE (BEAKER) 110 meq/L           H            



             (test code = 382)                                        

 

             CO2 (BEAKER) 21 meq/L     22-29        L            



             (test code = 355)                                        

 

             BLOOD UREA   36 mg/dL     7-21         H            



             NITROGEN (BEAKER)                                        



             (test code = 354)                                        

 

             CREATININE   2.27 mg/dL   0.57-1.25    H            



             (BEAKER) (test                                        



             code = 358)                                         

 

             GLUCOSE RANDOM 174 mg/dL           H            



             (BEAKER) (test                                        



             code = 652)                                         

 

             CALCIUM (BEAKER) 8.4 mg/dL    8.4-10.2                  



             (test code = 697)                                        

 

             EGFR (BEAKER) 31                                      Interpretatio

n of eGFR



             (test code = mL/min/1.73                            values Stage De

scription



             1092)        sq m                                   Result G1 Leah

l or high



                                                                 >=90  G2 Mildly

 decreased



                                                                 60-89 G3a Mildl

y to



                                                                 moderately 45-5

9 G3b



                                                                 Moderately to s

everely



                                                                 30-44 G4 Severl

y decreased



                                                                 15-29 G5 Kidney

 failure



                                                                 <15Reported eGF

R is based



                                                                 on the CKD-EPI 





                                                                 equation that d

oes not use



                                                                 a race



                                                                 coefficientEsti

mated GFR



                                                                 is not as accur

ate as



                                                                 Creatinine Tamiko

latosha in



                                                                 predicting glom

erular



                                                                 filtration rate

. Estimated



                                                                 GFR is not appl

icable for



                                                                 dialysis patien

ts



 ID - PATIENCE [facility] (HEMOGRAM ONLY)2022-08-10 04:48:05





             Test Item    Value        Reference Range Interpretation Comments

 

             WHITE BLOOD CELL COUNT (BEAKER) 11.2 K/ L    3.5-10.5     H        

    



             (test code = 775)                                        

 

             RED BLOOD CELL COUNT (BEAKER) 3.43 M/ L    4.63-6.08    L          

  



             (test code = 761)                                        

 

             HEMOGLOBIN (BEAKER) (test code = 9.1 GM/DL    13.7-17.5    L       

     



             410)                                                

 

             HEMATOCRIT (BEAKER) (test code = 28.4 %       40.1-51.0    L       

     



             411)                                                

 

             MEAN CORPUSCULAR VOLUME (BEAKER) 82.8 fL      79.0-92.2            

     



             (test code = 753)                                        

 

             MEAN CORPUSCULAR HEMOGLOBIN 26.5 pg      25.7-32.2                 



             (BEAKER) (test code = 751)                                        

 

             MEAN CORPUSCULAR HEMOGLOBIN CONC 32.0 GM/DL   32.3-36.5    L       

     



             (AKER) (test code = 752)                                        

 

             RED CELL DISTRIBUTION WIDTH 14.6 %       11.6-14.4    H            



             (AKER) (test code = 412)                                        

 

             PLATELET COUNT ([facility]) (test 204 K/CU MM  150-450                 

  



             code = 756)                                         

 

             MEAN PLATELET VOLUME (AKER) 10.6 fL      9.4-12.4                

  



             (test code = 754)                                        

 

             NUCLEATED RED BLOOD CELLS 0 /100 WBC   0-0                       



             ([facility]) (test code = 413)                                        



POCT-GLUCOSE [id number] 21:34:49





             Test Item    Value        Reference Range Interpretation Comments

 

             POC-GLUCOSE METER 311 mg/dL           H            : TESTED A

T [place] [address]



             ([facility]) (test code =                                        [facility],



             1538)                                               [address]:



                                                                 /Techni

matilda ID



                                                                 = [id number] for SA

CONNER ARRIETA



POCT-GLUCOSE [id number] 17:05:26





             Test Item    Value        Reference Range Interpretation Comments

 

             POC-GLUCOSE METER 327 mg/dL           H            : Notified

 RN/MD:



             ([facility]) (test code =                                        TESTED

 AT [place] [address])                                               [facility],



                                                                 [address]:



                                                                 /Techni

matilda ID



                                                                 = [id number] for Sh

Millie calixto



POCT-GLUCOSE [id number] 12:53:23





             Test Item    Value        Reference Range Interpretation Comments

 

             POC-GLUCOSE METER 177 mg/dL           H            : Notified

 RN/MD:



             ([facility]) (test code =                                        TESTED

 AT [place] [address]



             153)                                               [facility],



                                                                 [address]:



                                                                 /Techni

matilda ID



                                                                 = [id number] for SA

AMY CARTER



SARS-COV2/RT-PCR ([facility])2022 10:00:17





             Test Item    Value        Reference Range Interpretation Comments

 

             SARS-COV2/RT-PCR (test Negative     Not Detected, Negative,        

      



             code = 3609451)              See external report for              



                                       linked test               

 

             SARS-COV-2 PERFORMING LAB [facility] MARY                             



             (test code = 5493148)                                        



Negative result for this test determines that SARS-CoV-2 RNA was not present in 
the specimen above the Limit of Detection (LOD). However, Negative results do 
not preclude SARS-CoV-2 infection and should not be used as the sole basis for 
treatment or patient management decisions. Negative results must be combined 
with clinical observations, patient history, and epidemiological information. A 
false negative result may occur if a specimen is improperly collected, 
transported or handled. A false negative result should be considered if 
patient's recent exposures or clinical presentation indicate that COVID-19 
(SARS-CoV-2) is likely and diagnostic tests for other causes of illness are 
negative. Re-testing should be considered in cases of suspected false 
negatives.The limit of detection for this assay is 800 copies/mL.This SARS CoV-2
test is a real-time RT-PCR test intended for the qualitative detection of 
nucleic acid from SARS-CoV-2 in a nasopharyngeal swab specimen collected from 
individuals suspected of COVID-19 by their healthcare provider.This test has not
been Food and Drug Administration (FDA) cleared or approved. This is a modified 
version of an approved Emergency Use Authorization (EUA) and is in the process 
of review by the FDA. Once authorized by the FDA, the issued EUA will be 
effective until the declaration that circumstances exist justifying the 
authorization of the emergency use ofin vitro diagnostic tests for detection 
and/or diagnosis of COVID-19 is terminated under Section 564(b)(2) of the Act or
the EUA is revoked under Section 564(g) of the Act.Fact Sheet for Healthcare 
Prov
iders:https://www.[employer]/sites/default/files/product/documents/Fact_Sheet_HC

_Bvwmztheo_Scvs_LGXH-SwL-7.pdfFact Sheet for Healthcare 
Patients:https://www.[employer]/sites/default/files/product/docume
nts/Fksd_Eyrvm_Fnnueooj_Hgpd_JJXP-WfQ-9.pdfPerforming Laboratory:[facility] [place], [place] [address]POCT-GLUCOSE METER
2022 07:55:43





             Test Item    Value        Reference Range Interpretation Comments

 

             POC-GLUCOSE METER 152 mg/dL           H            : TESTED A

T [facility] [address]



             (CHARISSE) (test code =                                        [place],



             1538)                                               [id number]:



                                                                 /Techni

matilda ID



                                                                 = [id number] for AMY CONTRERAS



[id number] 07:01:21





             Test Item    Value        Reference Range Interpretation Comments

 

             PARTIAL THROMBOPLASTIN TIME 29.9 seconds 22.5-36.0                 



             (BEAKER) (test code = 760)                                        



PROTHROMBIN [id number] 07:01:02





             Test Item    Value        Reference Range Interpretation Comments

 

             PROTIME (BEAKER) 13.9 seconds 11.9-14.2                 



             (test code = 759)                                        

 

             INR (BEAKER) (test 1.14         See_Comment                [Automat

ed message]



             code = 370)                                         The system [employer]



                                                                 generated this 

result



                                                                 transmitted ref

erence



                                                                 range: <=5.90. 

The



                                                                 reference range

 was



                                                                 not used to int

erpret



                                                                 this result as



                                                                 normal/abnormal

.



RECOMMENDED COUMADIN/WARFARIN INR THERAPY RANGESSTANDARD DOSE: 2.0 - 3.0 
Includes: PROPHYLAXIS for venous thrombosis, systemic embolization; TREATMENT 
for venous thrombosis and/or pulmonary embolus.HIGH RISK: Target INR is 2.5-3.5 
for patients with mechanical heart valves.BASIC METABOLIC [id number] 
05:24:04





             Test Item    Value        Reference Range Interpretation Comments

 

             SODIUM (BEAKER) 138 meq/L    136-145                   



             (test code = 381)                                        

 

             POTASSIUM    4.2 meq/L    3.5-5.1                   



             (BEAKER) (test                                        



             code = 379)                                         

 

             CHLORIDE (BEAKER) 110 meq/L           H            



             (test code = 382)                                        

 

             CO2 (BEAKER) 19 meq/L     22-29        L            



             (test code = 355)                                        

 

             BLOOD UREA   40 mg/dL     7-21         H            



             NITROGEN (BEAKER)                                        



             (test code = 354)                                        

 

             CREATININE   2.74 mg/dL   0.57-1.25    H            



             (BEAKER) (test                                        



             code = 358)                                         

 

             GLUCOSE RANDOM 176 mg/dL           H            



             (BEAKER) (test                                        



             code = 652)                                         

 

             CALCIUM (BEAKER) 8.8 mg/dL    8.4-10.2                  



             (test code = 697)                                        

 

             EGFR (BEAKER) 25                                      Interpretatio

n of eGFR



             (test code = mL/min/1.73                            values Stage De

scription



             1092)        sq m                                   Result G1 Leah

l or high



                                                                 >=90  G2 Mildly

 decreased



                                                                 60-89 G3a Mildl

y to



                                                                 moderately 45-5

9 G3b



                                                                 Moderately to s

everely



                                                                 30-44 G4 Severl

y decreased



                                                                 15-29 G5 Kidney

 failure



                                                                 <15Reported eGF

R is based



                                                                 on the CKD-EPI 





                                                                 equation that d

oes not use



                                                                 a race



                                                                 coefficientEsti

mated GFR



                                                                 is not as accur

ate as



                                                                 Creatinine Tamiko

latosha in



                                                                 predicting glom

erular



                                                                 filtration rate

. Estimated



                                                                 GFR is not appl

icable for



                                                                 dialysis patien

landon



 ID - PIAYA LCBC (HEMOGRAM ONLY)2022 04:29:49





             Test Item    Value        Reference Range Interpretation Comments

 

             WHITE BLOOD CELL COUNT (BEAKER) 13.6 K/ L    3.5-10.5     H        

    



             (test code = 775)                                        

 

             RED BLOOD CELL COUNT (BEAKER) 3.42 M/ L    4.63-6.08    L          

  



             (test code = 761)                                        

 

             HEMOGLOBIN (BEAKER) (test code = 9.2 GM/DL    13.7-17.5    L       

     



             410)                                                

 

             HEMATOCRIT (BEAKER) (test code = 27.9 %       40.1-51.0    L       

     



             411)                                                

 

             MEAN CORPUSCULAR VOLUME (BEAKER) 81.6 fL      79.0-92.2            

     



             (test code = 753)                                        

 

             MEAN CORPUSCULAR HEMOGLOBIN 26.9 pg      25.7-32.2                 



             (BEAKER) (test code = 751)                                        

 

             MEAN CORPUSCULAR HEMOGLOBIN CONC 33.0 GM/DL   32.3-36.5            

     



             (BEAKER) (test code = 752)                                        

 

             RED CELL DISTRIBUTION WIDTH 14.6 %       11.6-14.4    H            



             (BEAKER) (test code = 412)                                        

 

             PLATELET COUNT (BEAKER) (test 200 K/CU MM  150-450                 

  



             code = 756)                                         

 

             MEAN PLATELET VOLUME (BEAKER) 10.2 fL      9.4-12.4                

  



             (test code = 754)                                        

 

             NUCLEATED RED BLOOD CELLS 0 /100 WBC   0-0                       



             (BEAKER) (test code = 413)                                        



POCT-GLUCOSE [id number] 21:33:46





             Test Item    Value        Reference Range Interpretation Comments

 

             POC-GLUCOSE METER 272 mg/dL           H            : TESTED A

T [facility] [address]



             (BEAKER) (test code =                                        [place],



             [address])                                               [address]:



                                                                 /Techni

matilda ID



                                                                 = [id number] for CONNER LY



POCT-GLUCOSE [id number] 18:07:26





             Test Item    Value        Reference Range Interpretation Comments

 

             POC-GLUCOSE METER 204 mg/dL           H            : TESTED A

T [facility] [address]



             (BEAKER) (test code =                                        [place] [facility],



             1538)                                               [address]:



                                                                 /Techni

matilda ID



                                                                 = [id number] for AMY CONTRERAS



POCT-GLUCOSE [id number] 12:57:22





             Test Item    Value        Reference Range Interpretation Comments

 

             POC-GLUCOSE METER 201 mg/dL           H            : TESTED A

[facility]



             (AKER) (test code =                                        [place] [facility],



             1538)                                               [address]:



                                                                 /Techni

matilda ID



                                                                 = [id number] for AMY CONTRERAS



PROTEIN ELECTROPHORESIS, SERUM WITH REFLEX TO [id number] 11:13:21





             Test Item    Value        Reference Range Interpretation Comments

 

             ALBUMIN FRACTION 3.5 gm/dL    3.5-5.5                   



             (BEAKER) (test code                                        



             = 405)                                              

 

             ALPHA 1 FRACTION 0.4 gm/dL    0.2-0.4                   



             (BEAKER) (test code                                        



             = 389)                                              

 

             ALPHA 2 FRACTION 0.8 gm/dL    0.4-1.0                   



             (BEAKER) (test code                                        



             = 390)                                              

 

             BETA FRACTION 0.7 gm/dL    0.5-1.1                   



             (BEAKER) (test code                                        



             = 392)                                              

 

             GAMMA GLOBULIN 0.8 gm/dL    0.7-1.6                   



             FRACTION (BEAKER)                                        



             (test code = 391)                                        

 

             INTERPRETATION-119 Normal electrophoretic                          

 



             (AKER) (test code pattern. No abnormal peak                      

     



             = 2615)      detected.                              

 

             [id number] Vivien Muhammad M.D                           



             9 ([facility]) (test (electronic signature)                           



             code = 2616)                                        

 

             PROTEIN TOTAL 6.1 gm/dL    6.0-8.3                   



             SERUM, SPEP                                         



             (AKER) (test code                                        



             = 2660)                                             



 ID - PATIENCE M- ID - ADMOperator ID - ADMHEMOGLOBIN [id number] 
10:39:25





             Test Item    Value        Reference Range Interpretation Comments

 

             HEMOGLOBIN A1C 5.9 %        See_Comment  H             [Automated m

essage]



             ELECTROPHORESIS ([facility])                                        The

 system which



             (test code = 3811)                                        generated

 this result



                                                                 transmitted ref

erence



                                                                 range: <=5.6%. 

The



                                                                 reference range

 was



                                                                 not used to int

erpret



                                                                 this result as



                                                                 normal/abnormal

.



"The A1c is measured using a NGSP-certified method. HbA1c value equal to or 
greater than 6.5% as thediagnosis cutoff for diabetes. An HbA1c value of 5.7-
6.4% indicates increased risk for diabetes (prediabetes)." ID - ADMBASIC
METABOLIC [id number] 08:17:58





             Test Item    Value        Reference Range Interpretation Comments

 

             SODIUM (BEAKER) 139 meq/L    136-145                   



             (test code = 381)                                        

 

             POTASSIUM    3.8 meq/L    3.5-5.1                   



             (BEAKER) (test                                        



             code = 379)                                         

 

             CHLORIDE (BEAKER) 109 meq/L           H            



             (test code = 382)                                        

 

             CO2 (BEAKER) 22 meq/L     22-29                     



             (test code = 355)                                        

 

             BLOOD UREA   38 mg/dL     7-21         H            



             NITROGEN (BEAKER)                                        



             (test code = 354)                                        

 

             CREATININE   2.98 mg/dL   0.57-1.25    H            



             (BEAKER) (test                                        



             code = 358)                                         

 

             GLUCOSE RANDOM 154 mg/dL           H            



             (BEAKER) (test                                        



             code = 652)                                         

 

             CALCIUM (BEAKER) 8.8 mg/dL    8.4-10.2                  



             (test code = 697)                                        

 

             EGFR (BEAKER) 22                                      Interpretatio

n of eGFR



             (test code = mL/min/1.73                            values Stage De

scription



             1092)        sq m                                   Result G1 Leah

l or high



                                                                 >=90 G2 Mildly 

decreased



                                                                 60-89 G3a Mildl

y to



                                                                 moderately 45-5

9 G3b



                                                                 Moderately to s

everely



                                                                 30-44 G4 Severl

y decreased



                                                                 15-29 G5 Kidney

 failure



                                                                 <15Reported eGF

R is based



                                                                 on the CKD-EPI 





                                                                 equation that d

oes not use



                                                                 a race



                                                                 coefficientEsti

mated GFR



                                                                 is not as accur

ate as



                                                                 Creatinine Tamiko

latosha in



                                                                 predicting glom

erular



                                                                 filtration rate

. Estimated



                                                                 GFR is not appl

icable for



                                                                 dialysis patien

ts



 ID - MOPOCT-GLUCOSE [id number] 08:06:49





             Test Item    Value        Reference Range Interpretation Comments

 

             POC-GLUCOSE METER 150 mg/dL           H            : TESTED A

T [facility] [address]



             (BEAKER) (test code =                                        [place],



             [address]38)                                               [address]:



                                                                 /Techni

matilda ID



                                                                 = [id number] for AMY CONTRERAS



[id number] 08:04:57





             Test Item    Value        Reference Range Interpretation Comments

 

             MAGNESIUM (BEAKER) (test code = 1.7 mg/dL    1.6-2.6               

    



             627)                                                



 ID - MOCBC W/PLT COUNT &amp; AUTO [id number] 07:46:01





             Test Item    Value        Reference Range Interpretation Comments

 

             WHITE BLOOD CELL COUNT (BEAKER) 14.1 K/ L    3.5-10.5     H        

    



             (test code = 775)                                        

 

             RED BLOOD CELL COUNT (BEAKER) 3.23 M/ L    4.63-6.08    L          

  



             (test code = 761)                                        

 

             HEMOGLOBIN (BEAKER) (test code = 8.8 GM/DL    13.7-17.5    L       

     



             410)                                                

 

             HEMATOCRIT (BEAKER) (test code = 27.1 %       40.1-51.0    L       

     



             411)                                                

 

             MEAN CORPUSCULAR VOLUME (BEAKER) 83.9 fL      79.0-92.2            

     



             (test code = 753)                                        

 

             MEAN CORPUSCULAR HEMOGLOBIN 27.2 pg      25.7-32.2                 



             (BEAKER) (test code = 751)                                        

 

             MEAN CORPUSCULAR HEMOGLOBIN CONC 32.5 GM/DL   32.3-36.5            

     



             (BEAKER) (test code = 752)                                        

 

             RED CELL DISTRIBUTION WIDTH 14.5 %       11.6-14.4    H            



             (BEAKER) (test code = 412)                                        

 

             PLATELET COUNT (BEAKER) (test 193 K/CU MM  150-450                 

  



             code = 756)                                         

 

             MEAN PLATELET VOLUME (BEAKER) 10.2 fL      9.4-12.4                

  



             (test code = 754)                                        

 

             NUCLEATED RED BLOOD CELLS 0 /100 WBC   0-0                       



             (BEAKER) (test code = 413)                                        

 

             NEUTROPHILS RELATIVE PERCENT 81 %                                  

 



             (BEAKER) (test code = 429)                                        

 

             LYMPHOCYTES RELATIVE PERCENT 8 %                                   

 



             (BEAKER) (test code = 430)                                        

 

             MONOCYTES RELATIVE PERCENT 10 %                                   



             (BEAKER) (test code = 431)                                        

 

             EOSINOPHILS RELATIVE PERCENT 0 %                                   

 



             (BEAKER) (test code = 432)                                        

 

             BASOPHILS RELATIVE PERCENT 0 %                                    



             (BEAKER) (test code = 437)                                        

 

             NEUTROPHILS ABSOLUTE COUNT 11.44 K/ L   1.78-5.38    H            



             (BEAKER) (test code = 670)                                        

 

             LYMPHOCYTES ABSOLUTE COUNT 1.14 K/ L    1.32-3.57    L            



             (BEAKER) (test code = 414)                                        

 

             MONOCYTES ABSOLUTE COUNT (BEAKER) 1.37 K/ L    0.30-0.82    H      

      



             (test code = 415)                                        

 

             EOSINOPHILS ABSOLUTE COUNT 0.06 K/ L    0.04-0.54                 



             (BEAKER) (test code = 416)                                        

 

             BASOPHILS ABSOLUTE COUNT (BEAKER) 0.03 K/ L    0.01-0.08           

      



             (test code = 417)                                        

 

             IMMATURE GRANULOCYTES-RELATIVE 1 %          0-1                    

   



             PERCENT (BEAKER) (test code =                                      

  



             2801)                                               



POCT-GLUCOSE [id number] 20:54:59





             Test Item    Value        Reference Range Interpretation Comments

 

             POC-GLUCOSE METER 334 mg/dL           H            : TESTED A

T [facility] [address]



             (BEAKER) (test code =                                        [facility],



             1538)                                               [address]:



                                                                 /Techni

matilda ID



                                                                 = [id number] for Millie Rdz



POCT-GLUCOSE [id number] 17:14:24





             Test Item    Value        Reference Range Interpretation Comments

 

             POC-GLUCOSE METER 294 mg/dL           H            : TESTED A

T [facility] [address]



             (BEAKER) (test code =                                        [facility],



             1538)                                               [address]:



                                                                 /Techni

matilda ID



                                                                 = [id number] for Blanka Freedman



POCT-GLUCOSE [id number] 12:35:15





             Test Item    Value        Reference Range Interpretation Comments

 

             POC-GLUCOSE METER 284 mg/dL           H            : TESTED A

T [facility] [address]



             (BEAKER) (test code =                                        [facility],



             1538)                                               [address]:



                                                                 /Techni

matilda ID



                                                                 = [id number] for Blanka Freedman



POCT-GLUCOSE [id number] 08:14:41





             Test Item    Value        Reference Range Interpretation Comments

 

             POC-GLUCOSE METER 182 mg/dL           H            : TESTED A

T [facility] [address]



             (BEAKER) (test code =                                        [facility],



             1538)                                               [address]:



                                                                 /Techni

matilda ID



                                                                 = [id number] for Ch

Hugo gibbsylynn



CALCIUM, [id number] 08:08:36





             Test Item    Value        Reference Range Interpretation Comments

 

             CALCIUM IONIZED (BEAKER) (test 1.18 mmol/L  1.12-1.27              

   



             code = 698)                                         

 

             PH, BLOOD (BEAKER) (test code = 7.35                               

    



             1810)                                               



BASIC METABOLIC [id number] 05:44:25





             Test Item    Value        Reference Range Interpretation Comments

 

             SODIUM (BEAKER) 136 meq/L    136-145                   



             (test code = 381)                                        

 

             POTASSIUM    4.0 meq/L    3.5-5.1                   



             (BEAKER) (test                                        



             code = 379)                                         

 

             CHLORIDE (BEAKER) 107 meq/L                        



             (test code = 382)                                        

 

             CO2 (BEAKER) 20 meq/L     22-29        L            



             (test code = 355)                                        

 

             BLOOD UREA   34 mg/dL     7-21         H            



             NITROGEN (BEAKER)                                        



             (test code = 354)                                        

 

             CREATININE   3.37 mg/dL   0.57-1.25    H            



             (BEAKER) (test                                        



             code = 358)                                         

 

             GLUCOSE RANDOM 190 mg/dL           H            



             (BEAKER) (test                                        



             code = 652)                                         

 

             CALCIUM (BEAKER) 9.2 mg/dL    8.4-10.2                  



             (test code = 697)                                        

 

             EGFR (BEAKER) 19                                       Interpretati

on of eGFR



             (test code = mL/min/1.73                            values Stage De

scription



             1092)        sq m                                   Result G1 Leah

l or high



                                                                 >=90 G2 Mildly 

decreased



                                                                 60-89 G3a Mildl

y to



                                                                 moderately 45-5

9 G3b



                                                                 Moderately to s

everely



                                                                 30-44 G4 Severl

y decreased



                                                                 15-29 G5 Kidney

 failure



                                                                 <15Reported eGF

R is based



                                                                 on the CKD-EPI 





                                                                 equation that d

oes not use



                                                                 a race



                                                                 coefficientEsti

mated GFR



                                                                 is not as accur

ate as



                                                                 Creatinine Tamiko

latosha in



                                                                 predicting glom

erular



                                                                 filtration rate

. Estimated



                                                                 GFR is not appl

icable for



                                                                 dialysis patien

ts



 ID - PATIENCE [id number] 05:37:49





             Test Item    Value        Reference Range Interpretation Comments

 

             MAGNESIUM (BEAKER) (test code = 1.6 mg/dL    1.6-2.6               

    



             627)                                                



 ID - PATIENCE [facility] W/PLT COUNT &amp; AUTO [id number] 05:37:28





             Test Item    Value        Reference Range Interpretation Comments

 

             WHITE BLOOD CELL COUNT (BEAKER) 9.4 K/ L     3.5-10.5              

    



             (test code = 775)                                        

 

             RED BLOOD CELL COUNT (BEAKER) 3.38 M/ L    4.63-6.08    L          

  



             (test code = 761)                                        

 

             HEMOGLOBIN (BEAKER) (test code = 9.0 GM/DL    13.7-17.5    L       

     



             410)                                                

 

             HEMATOCRIT (BEAKER) (test code = 28.1 %       40.1-51.0    L       

     



             411)                                                

 

             MEAN CORPUSCULAR VOLUME (BEAKER) 83.1 fL      79.0-92.2            

     



             (test code = 753)                                        

 

             MEAN CORPUSCULAR HEMOGLOBIN 26.6 pg      25.7-32.2                 



             (BEAKER) (test code = 751)                                        

 

             MEAN CORPUSCULAR HEMOGLOBIN CONC 32.0 GM/DL   32.3-36.5    L       

     



             (BEAKER) (test code = 752)                                        

 

             RED CELL DISTRIBUTION WIDTH 14.3 %       11.6-14.4                 



             (BEAKER) (test code = 412)                                        

 

             PLATELET COUNT (BEAKER) (test 160 K/CU MM  150-450                 

  



             code = 756)                                         

 

             MEAN PLATELET VOLUME (BEAKER) 10.0 fL      9.4-12.4                

  



             (test code = 754)                                        

 

             NUCLEATED RED BLOOD CELLS 0 /100 WBC   0-0                       



             (BEAKER) (test code = 413)                                        

 

             NEUTROPHILS RELATIVE PERCENT 90 %                                  

 



             (BEAKER) (test code = 429)                                        

 

             LYMPHOCYTES RELATIVE PERCENT 6 %                                   

 



             (BEAKER) (test code = 430)                                        

 

             MONOCYTES RELATIVE PERCENT 3 %                                    



             (BEAKER) (test code = 431)                                        

 

             EOSINOPHILS RELATIVE PERCENT 1 %                                   

 



             (BEAKER) (test code = 432)                                        

 

             BASOPHILS RELATIVE PERCENT 0 %                                    



             (BEAKER) (test code = 437)                                        

 

             NEUTROPHILS ABSOLUTE COUNT 8.47 K/ L    1.78-5.38    H            



             (BEAKER) (test code = 670)                                        

 

             LYMPHOCYTES ABSOLUTE COUNT 0.56 K/ L    1.32-3.57    L            



             (BEAKER) (test code = 414)                                        

 

             MONOCYTES ABSOLUTE COUNT (BEAKER) 0.25 K/ L    0.30-0.82    L      

      



             (test code = 415)                                        

 

             EOSINOPHILS ABSOLUTE COUNT 0.05 K/ L    0.04-0.54                 



             (BEAKER) (test code = 416)                                        

 

             BASOPHILS ABSOLUTE COUNT (BEAKER) 0.01 K/ L    0.01-0.08           

      



             (test code = 417)                                        

 

             IMMATURE GRANULOCYTES-RELATIVE 1 %          0-1                    

   



             PERCENT (BEAKER) (test code =                                      

  



             2801)                                               



POCT-GLUCOSE [id number] 21:26:35





             Test Item    Value        Reference Range Interpretation Comments

 

             POC-GLUCOSE METER 147 mg/dL           H            : TESTED A

T [facility] [address]



             (BEAKER) (test code =                                        [facility] [place],



             1538)                                               [address]:



                                                                 /Techni

matilda ID



                                                                 = [id number] for AR

DAHLIACLARKE CROW



POCT-GLUCOSE [id number] 17:14:12





             Test Item    Value        Reference Range Interpretation Comments

 

             POC-GLUCOSE METER 137 mg/dL           H            : TESTED A

T [facility] [address]



             (BEAKER) (test code =                                        [facility] [place],



             1538)                                               [address]:



                                                                 /Techni

matilda ID



                                                                 = [id number] for



                                                                 GLADYS CLAUDIO



POCT-GLUCOSE [id number] 12:22:48





             Test Item    Value        Reference Range Interpretation Comments

 

             POC-GLUCOSE METER 150 mg/dL           H            : TESTED A

T [facility] [address]



             (BEAKER) (test code =                                        [facility],



             1538)                                               [address]:



                                                                 /Techni

matilda ID



                                                                 = [id number] for



                                                                 GLADYS CLAUDIOA



POCT-GLUCOSE [id number] 08:05:57





             Test Item    Value        Reference Range Interpretation Comments

 

             POC-GLUCOSE METER 135 mg/dL           H            : TESTED A

T [facility] [address]



             (BEAKER) (test code =                                        [facility]

JOVAN [facility],



             1538)                                               [address]:



                                                                 /Techni

matilda ID



                                                                 = [id number] for



                                                                 GLADYS CLAUDIO



BASIC METABOLIC [id number] 05:41:11





             Test Item    Value        Reference Range Interpretation Comments

 

             SODIUM (BEAKER) 138 meq/L    136-145                   



             (test code = 381)                                        

 

             POTASSIUM    3.9 meq/L    3.5-5.1                   



             (BEAKER) (test                                        



             code = 379)                                         

 

             CHLORIDE (BEAKER) 109 meq/L           H            



             (test code = 382)                                        

 

             CO2 (BEAKER) 19 meq/L     22-29        L            



             (test code = 355)                                        

 

             BLOOD UREA   36 mg/dL     7-21         H            



             NITROGEN (BEAKER)                                        



             (test code = 354)                                        

 

             CREATININE   3.51 mg/dL   0.57-1.25    H            



             (BEAKER) (test                                        



             code = 358)                                         

 

             GLUCOSE RANDOM 185 mg/dL           H            



             (BEAKER) (test                                        



             code = 652)                                         

 

             CALCIUM (BEAKER) 9.3 mg/dL    8.4-10.2                  



             (test code = 697)                                        

 

             EGFR (BEAKER) 18                                      Interpretatio

n of eGFR



             (test code = mL/min/1.73                            values Stage De

scription



             1092)        sq m                                   Result G1 Leah

l or high



                                                                 >=90 G2 Mildly 

decreased



                                                                 60-89 G3a Mildl

y to



                                                                 moderately 45-5

9 G3b



                                                                 Moderately to s

everely



                                                                 30-44 G4 Severl

y decreased



                                                                 15-29 G5 Kidney

 failure



                                                                 <15Reported eGF

R is based



                                                                 on the CKD-EPI 





                                                                 equation that d

oes not use



                                                                 a race



                                                                 coefficientEsti

mated GFR



                                                                 is not as accur

ate as



                                                                 Creatinine Tamiko

latosha in



                                                                 predicting glom

erular



                                                                 filtration rate

. Estimated



                                                                 GFR is not appl

icable for



                                                                 dialysis patien

ts



 ID - PATIENCE [facility] (HEMOGRAM ONLY)2022 04:50:11





             Test Item    Value        Reference Range Interpretation Comments

 

             WHITE BLOOD CELL COUNT (BEAKER) 7.8 K/ L     3.5-10.5              

    



             (test code = 775)                                        

 

             RED BLOOD CELL COUNT (BEAKER) 3.30 M/ L    4.63-6.08    L          

  



             (test code = 761)                                        

 

             HEMOGLOBIN (BEAKER) (test code = 8.9 GM/DL    13.7-17.5    L       

     



             410)                                                

 

             HEMATOCRIT (BEAKER) (test code = 27.1 %       40.1-51.0    L       

     



             411)                                                

 

             MEAN CORPUSCULAR VOLUME (BEAKER) 82.1 fL      79.0-92.2            

     



             (test code = 753)                                        

 

             MEAN CORPUSCULAR HEMOGLOBIN 27.0 pg      25.7-32.2                 



             (BEAKER) (test code = 751)                                        

 

             MEAN CORPUSCULAR HEMOGLOBIN CONC 32.8 GM/DL   32.3-36.5            

     



             (BEAKER) (test code = 752)                                        

 

             RED CELL DISTRIBUTION WIDTH 14.5 %       11.6-14.4    H            



             (BEAKER) (test code = 412)                                        

 

             PLATELET COUNT (BEAKER) (test 165 K/CU MM  150-450                 

  



             code = 756)                                         

 

             MEAN PLATELET VOLUME (BEAKER) 10.0 fL      9.4-12.4                

  



             (test code = 754)                                        

 

             NUCLEATED RED BLOOD CELLS 0 /100 WBC   0-0                       



             (BEAKER) (test code = 413)                                        



U/S, RENAL, [id number] 01:52:00Reason for exam:-&gt;B hydronephrosis
************************************************************[facility]OT[facility]AYOR : 1955 Sex: 
M************************************************************FINAL REPORT 
PATIENT ID: [id number] Renal ultrasound dated 2022 CLINICAL HISTORY: B 
hydronephrosis COMPARISON: 8/3/2022 Comment: Real-time transabdominal renal 
ultrasound was performed. Right kidney measures 11.5 x 6.5 x 4.9 cm. Left kidney
measures 12.4 x 7.0 x 5.3 cm. Right renal cortex measures 2.2 cm. Left renal 
cortex measures 1.8 cm. Renal parenchymal echogenicity: Normal There is mild to 
moderate bilateral hydronephrosis, right greater than left. There is no 
nephrolithiasis identified on the images provided. No cyst is identified on 
either kidney. Doppler ultrasound demonstrates a patent main renal artery and 
vein bilaterally. A Villafuerte catheter decompresses the urinary bladder. Impression:
Persistent but slightly improved bilateral hydronephrosis when compared to 
recent previous. Findings probably relate to ureteral obstruction from a 
retroperitoneal lesion described on CT abdomen pelvis dated 2022. Please 
refer to that report. Signed: Rene Alicea MDReport Verified Date/Time: 
2022 01:52:26 Electronically signed by: RENE ALICEA M.D. on 2022
01:52 AMPOCT-GLUCOSE [id number] 21:28:14





             Test Item    Value        Reference Range Interpretation Comments

 

             POC-GLUCOSE METER 226 mg/dL           H            : TESTED A

[facility]



             ([employer]) (test code =                                        [facility],



             153)                                               [address]:



                                                                 /Techni

matilda ID



                                                                 = [id number] for HALEIGH VELÁZQUEZ



POCT-GLUCOSE [id number] 18:11:09





             Test Item    Value        Reference Range Interpretation Comments

 

             POC-GLUCOSE METER 109 mg/dL                        : TESTED A

T [facility] [address]



             ([employer]) (test code =                                        [facility],



             1538)                                               [address]:



                                                                 /Techni

matilda ID



                                                                 = [id number] for Huma Kaerney



ANTI-NUCLEAR ANTIBODY (ASHVIN)2022 12:36:55





             Test Item    Value        Reference Range Interpretation Comments

 

             ANTI-NUCLEAR ANTIBODY (ASHVIN) ([facility]) Negative     Negative         

         



             (test code = 418)                                        



Test performed by IFA method.POCT-GLUCOSE [id number] 12:17:08





             Test Item    Value        Reference Range Interpretation Comments

 

             POC-GLUCOSE METER 137 mg/dL           H            : TESTED A

T [facility] [address]



             (BEAKER) (test code =                                        [facility],



             1538)                                               [address]:



                                                                 /Techni

matilda ID



                                                                 = [id number] for Blanka Freedman



[id number] 09:55:04





             Test Item    Value        Reference Range Interpretation Comments

 

             PARTIAL THROMBOPLASTIN TIME 117.3 seconds 22.5-36.0    H           

 



             (BEAKER) (test code = 760)                                        



POCT-GLUCOSE [id number] 08:08:34





             Test Item    Value        Reference Range Interpretation Comments

 

             POC-GLUCOSE METER 126 mg/dL           H            : TESTED A

T [facility] 6720



             (BEAKER) (test code =                                        [facility],



             1538)                                               [address]:



                                                                 /Techni

matilda ID



                                                                 = [id number] for Blanka Freedman



[id number] 05:29:45





             Test Item    Value        Reference Range Interpretation Comments

 

             FERRITIN (BEAKER) (test code = 112.21 ng/mL 5..00            

   



             361)                                                



 ID - PATIENCE GUERRERO-IRON, TIBC, % SAT. (WITHOUT FERRITIN)2022 05:23:21





             Test Item    Value        Reference Range Interpretation Comments

 

             IRON (BEAKER) (test code = 547) 97.0 ug/dL   40.0-160.0            

    

 

             TOTAL IRON BINDING CAPACITY 263 ug/dL    250-450                   



             (BEAKER) (test code = 769)                                        

 

             IRON % SATURATION (2) (BEAKER) 37 %         20-55                  

   



             (test code = 2590)                                        



 ID - PATIENCE GUERRERO-BASIC METABOLIC [id number] 05:08:42





             Test Item    Value        Reference Range Interpretation Comments

 

             SODIUM (BEAKER) 142 meq/L    136-145                   



             (test code = 381)                                        

 

             POTASSIUM    3.9 meq/L    3.5-5.1                   



             (BEAKER) (test                                        



             code = 379)                                         

 

             CHLORIDE (BEAKER) 111 meq/L           H            



             (test code = 382)                                        

 

             CO2 (BEAKER) 22 meq/L     22-29                     



             (test code = 355)                                        

 

             BLOOD UREA   37 mg/dL     7-21         H            



             NITROGEN (BEAKER)                                        



             (test code = 354)                                        

 

             CREATININE   3.57 mg/dL   0.57-1.25    H            



             (BEAKER) (test                                        



             code = 358)                                         

 

             GLUCOSE RANDOM 127 mg/dL           H            



             (BEAKER) (test                                        



             code = 652)                                         

 

             CALCIUM (BEAKER) 10.0 mg/dL   8.4-10.2                  



             (test code = 697)                                        

 

             EGFR (BEAKER) 18                                      Interpretatio

n of eGFR



             (test code = mL/min/1.73                            values Stage De

scription



             1092)        sq m                                   Result G1 Leah

l or high



                                                                 >=90 G2 Mildly 

decreased



                                                                 60-89 G3a Mildl

y to



                                                                 moderately 45-5

9 G3b



                                                                 Moderately to s

everely



                                                                 30-44 G4 Severl

y decreased



                                                                 15-29 G5 Kidney

 failure



                                                                 <15Reported eGF

R is based



                                                                 on the CKD-EPI 





                                                                 equation that d

oes not use



                                                                 a race



                                                                 coefficientEsti

mated GFR



                                                                 is not as accur

ate as



                                                                 Creatinine Tamiko

latosha in



                                                                 predicting glom

erular



                                                                 filtration rate

. Estimated



                                                                 GFR is not appl

icable for



                                                                 dialysis patien

ts



 ID - PATIENCE [id number] 05:07:14





             Test Item    Value        Reference Range Interpretation Comments

 

             MAGNESIUM (BEAKER) (test code = 1.8 mg/dL    1.6-2.6               

    



             627)                                                



 ID - PATIENCE M-C-REACTIVE [id number] 05:07:14





             Test Item    Value        Reference Range Interpretation Comments

 

             C-REACTIVE PROTEIN (BEAKER) (test 0.54 mg/dL   0.00-0.50    H      

      



             code = 676)                                         



 ID - PATIENCE [id number] 22:39:06





             Test Item    Value        Reference Range Interpretation Comments

 

             PARTIAL THROMBOPLASTIN TIME 34.0 seconds 22.5-36.0                 



             (BEAKER) (test code = 760)                                        



POCT-GLUCOSE [id number] 21:35:07





             Test Item    Value        Reference Range Interpretation Comments

 

             POC-GLUCOSE METER 124 mg/dL           H            : TESTED A

T [facility] 6720



             ([employer]) (test code =                                        [facility],



             1538)                                               [address]:



                                                                 /Techni

matilda ID



                                                                 = [id number] for AR

DAHLIATRISTIN



                                                                 CLARKE



POCT-GLUCOSE [id number] 16:56:36





             Test Item    Value        Reference Range Interpretation Comments

 

             POC-GLUCOSE METER 121 mg/dL           H            : TESTED A

T [facility] 6720



             (CHARISSE) (test code =                                        [facility] ALDA[facility],



             1538)                                               [address]:



                                                                 /Techni

matilda ID



                                                                 = [id number] for AMY CONTRERAS



[id number] 15:21:45





             Test Item    Value        Reference Range Interpretation Comments

 

             PARTIAL THROMBOPLASTIN TIME 64.9 seconds 22.5-36.0    H            



             (CHARISSE) (test code = 760)                                        



POCT-GLUCOSE [id number] 12:38:30





             Test Item    Value        Reference Range Interpretation Comments

 

             POC-GLUCOSE METER 135 mg/dL           H            : TESTED A

T [facility] 6720



             (CHARISSE) (test code =                                        [facility],



             1538)                                               [address]:



                                                                 /Techni

matilda ID



                                                                 = [id number] for AMY CONTRERAS



CT, [id number] 10:35:00Unlisted Reason for Exam - Click Yes and Enter 
Reason Below-&gt;NoIs this for enterography?-&gt;NoWill this procedure require 
oral contrast?-&gt;No
************************************************************[facility] : 1955  Sex: 
M************************************************************FINAL REPORT 
PATIENT ID: [id number] ABDOMINAL AND PELVIS CT DATED 2022 CLINICAL 
INFORMATION: Hydronephrosis TECHNIQUE: Axial images of the abdomen and pelvis 
were obtained from diaphragm to the pubic symphysis without GI or intravenous 
contrast. This exam was performed according to our departmental dose-
optimization program, which includes automated exposure control, adjustment of 
the mA and/or kV according to patient size and/or use of interactive 
reconstruction technique. COMMENT: Liver and spleen are normal in size without 
focal abnormality. Gallbladder is contracted. No gallstone or biliary dilatation
is noted. Pancreas and adrenals are unremarkable. Both kidneys are normal in 
size. Bilateral hydronephrosis and proximal hydroureter are seen. No 
urolithiasis is seen. The small and large bowel are suboptimally evaluated 
secondary to lack of GI and intravenous contrast. No small large dilatationor 
wall thickening is apparent. Appendix is normal in caliber. Contiguous soft 
tissue mass/adenopathy is seen in the periaortic, aortocaval retroperitoneum 
extend to the bilateral common and right internal iliac iliac lymphatic chain. 
Prostate is normal in size. The urinary bladder is contracted. Vascular 
opacification is seen in the abdominal aorta, superior mesenteric, and bilateral
iliac arteries.IMPRESSION: 1. Contiguous mass/adenopathy in the retroperitoneum 
extending to the pelvis suggestive of adenopathy or retroperitoneal fibrosis.2. 
Bilateral hydronephrosis and proximal hydroureter. Signed: Emily Flores MDReport 
Verified Date/Time: 2022 10:35:02 Electronically signed by: EMILY FLORES M.D. on 2022 10:35 AMPOCT-GLUCOSE [id number] 08:46:22





             Test Item    Value        Reference Range Interpretation Comments

 

             POC-GLUCOSE METER 116 mg/dL           H            : TESTED A

T [facility] 6720



             (BEAKER) (test code =                                        [facility],



             1538)                                               [address]:



                                                                 /Techni

matilda ID



                                                                 = [id number] for AMY CONTRERAS



[id number] 08:12:03





             Test Item    Value        Reference Range Interpretation Comments

 

             PROTIME (BEAKER) (test 13.8 seconds 11.9-14.2                 



             code = 759)                                         

 

             INR (BEAKER) (test 1.13         See_Comment                [Automat

ed



             code = 370)                                         message] The sy

stem



                                                                 which generated



                                                                 this result



                                                                 transmitted



                                                                 reference range

:



                                                                 <=5.90. The



                                                                 reference range

 was



                                                                 not used to



                                                                 interpret this



                                                                 result as



                                                                 normal/abnormal

.

 

             PARTIAL THROMBOPLASTIN 88.8 seconds 22.5-36.0    H            



             TIME (BEAKER) (test                                        



             code = 760)                                         



RECOMMENDED COUMADIN/WARFARIN INR THERAPY RANGESSTANDARD DOSE: 2.0 - 3.0 
Includes: PROPHYLAXIS for venous thrombosis, systemic embolization; TREATMENT 
for venous thrombosis and/or pulmonary embolus.HIGH RISK: Target INR is 2.5-3.5 
for patients with mechanical heart valves.[id number] 07:15:37





             Test Item    Value        Reference Range Interpretation Comments

 

             PARTIAL THROMBOPLASTIN TIME > seconds    22.5-36.0    HH           



             (BEAKER) (test code = 760)                                        



BASIC METABOLIC [id number] 03:17:31





             Test Item    Value        Reference Range Interpretation Comments

 

             SODIUM (BEAKER) 139 meq/L    136-145                   



             (test code = 381)                                        

 

             POTASSIUM    3.6 meq/L    3.5-5.1                   



             (BEAKER) (test                                        



             code = 379)                                         

 

             CHLORIDE (BEAKER) 109 meq/L           H            



             (test code = 382)                                        

 

             CO2 (BEAKER) 20 meq/L     22-29        L            



             (test code = 355)                                        

 

             BLOOD UREA   39 mg/dL     7-21         H            



             NITROGEN (BEAKER)                                        



             (test code = 354)                                        

 

             CREATININE   3.67 mg/dL   0.57-1.25    H            



             (BEAKER) (test                                        



             code = 358)                                         

 

             GLUCOSE RANDOM 76 mg/dL                         



             (BEAKER) (test                                        



             code = 652)                                         

 

             CALCIUM (BEAKER) 9.9 mg/dL    8.4-10.2                  



             (test code = 697)                                        

 

             EGFR (BEAKER) 17                                      Interpretatio

n of eGFR



             (test code = mL/min/1.73                            values Stage De

scription



             1092)        sq m                                   Result G1 Leah

l or high



                                                                 >=90 G2 Mildly 

decreased



                                                                 60-89 G3a Mildl

y to



                                                                 moderately 45-5

9 G3b



                                                                 Moderately to s

everely



                                                                 30-44 G4 Severl

y decreased



                                                                 15-29 G5 Kidney

 failure



                                                                 <15Reported eGF

R is based



                                                                 on the CKD-EPI 





                                                                 equation that d

oes not use



                                                                 a race



                                                                 coefficientEsti

mated GFR



                                                                 is not as accur

ate as



                                                                 Creatinine Tamiko

latosha in



                                                                 predicting glom

erular



                                                                 filtration rate

. Estimated



                                                                 GFR is not appl

icable for



                                                                 dialysis patien

ts



 ID - JULIA [id number] 02:48:05





             Test Item    Value        Reference Range Interpretation Comments

 

             MAGNESIUM (BEAKER) (test code = 1.9 mg/dL    1.6-2.6               

    



             627)                                                



 ID - JULIA LHIGH SENSITIVITY TROPONIN -[id number] 02:46:23





             Test Item    Value        Reference Range Interpretation Comments

 

             HIGH SENSITIVITY 337 pg/ml    See_Comment  H             [Automated

 message]



             TROPONIN I (test code                                        The sy

stem which



             = 1469004)                                          generated this 

result



                                                                 transmitted ref

erence



                                                                 range: <=35. Th

e



                                                                 reference range

 was



                                                                 not used to int

erpret



                                                                 this result as



                                                                 normal/abnormal

.



 ID - PATIENCE MThe  STAT High Sensitivity Troponin-I results 
should be used in conjunction with other diagnostic information such as ECG, 
clinical observations and information, and patient symptoms to aid in the 
diagnosis of MI.POCT-GLUCOSE [id number] 22:04:13





             Test Item    Value        Reference Range Interpretation Comments

 

             POC-GLUCOSE METER 139 mg/dL           H            : TESTED A

T [facility]



             [facility] (test code =                                        [facility] ALDA[facility],



             1538)                                               [address]:



                                                                 /Techni

matilda ID



                                                                 = [id number] for CONNER LY



[id number] 18:59:14





             Test Item    Value        Reference Range Interpretation Comments

 

             PARTIAL THROMBOPLASTIN TIME 42.8 seconds 22.5-36.0    H            



             ([facility]) (test code = 760)                                        



POCT-GLUCOSE [id number] 17:22:21





             Test Item    Value        Reference Range Interpretation Comments

 

             POC-GLUCOSE METER 113 mg/dL           H            : TESTED A

[facility]



             [facility] (test code =                                        [facility]IN[facility] ALDANA [facility],



             1538)                                               [address]:



                                                                 /Techni

matilda ID



                                                                 = [id number] for AMY CONTRERAS



U/S, RENAL, [id number] 14:34:00Reason for exam:-&gt;JENNA on CKD
************************************************************[facility] : 1955 Sex: 
M************************************************************FINAL REPORT 
PATIENT ID: [id number] U/S, RENAL, COMPLETE CLINICAL HISTORY: JENNA on CKD 
COMPARISON: None. TECHNIQUE: Real time grayscale and color Doppler imaging of 
the kidneys and pre and post void urinary bladder was performed. FINDINGS: Right
kidney:Length = 10.8 cmCortical thickness: NormalEchogenicity: NormalCollecting 
system: Moderate hydronephrosisOther findings: None Left kidney:Length = 11.2 
cmCortical thickness: NormalEchogenicity: NormalCollecting system: Mild to 
moderate hydronephrosisOther findings: None Urinary bladder: Normally distended 
prevoid with a volume of 491 mL. Post void residual urinary bladder volume 33 
mL. Bilateral hydronephrosis persists post void. IMPRESSION: Moderate right and 
mild to moderate left hydronephrosis. Signed: Verito León Verified 
Date/Time: 2022 14:34:08 Electronically signed by: VERITO LEÓN MD 
on 2022 02:34 PMPOCT-GLUCOSE [id number] 12:15:59





             Test Item    Value        Reference Range Interpretation Comments

 

             POC-GLUCOSE METER 123 mg/dL           H            : TESTED A

T [facility] 6720



             [facility] (test code =                                        [facility],



             1538)                                               [address]:



                                                                 /Techni

matilda ID



                                                                 = [id number] for AMY CONTRERAS



[id number] 10:04:57





             Test Item    Value        Reference Range Interpretation Comments

 

             PARTIAL THROMBOPLASTIN TIME 46.9 seconds 22.5-36.0    H            



             (BEAKER) (test code = 760)                                        



POCT-GLUCOSE [id number] 08:39:00





             Test Item    Value        Reference Range Interpretation Comments

 

             POC-GLUCOSE METER 116 mg/dL           H            : TESTED A

T [facility] 6720



             [facility] (test code =                                        [facility],



             1538)                                               [address]:



                                                                 /Techni

matilda ID



                                                                 = [id number] for AMY CONTRERAS



PROTEIN, RANDOM [id number] 07:06:23





             Test Item    Value        Reference Range Interpretation Comments

 

             PROTEIN, URINE (BEAKER) (test code = < mg/dL      0-14             

         



             1569)                                               



 ID - BSCREATININE, RANDOM [id number] 07:05:15





             Test Item    Value        Reference Range Interpretation Comments

 

             CREATININE URINE (BEAKER) (test 43.5 mg/dL                         

    



             code = 375)                                         



Reference Range: No NormalsOperator ID - BSBASIC METABOLIC [id number] 
04:19:40





             Test Item    Value        Reference Range Interpretation Comments

 

             SODIUM (BEAKER) 140 meq/L    136-145                   



             (test code = 381)                                        

 

             POTASSIUM    3.9 meq/L    3.5-5.1                   



             (BEAKER) (test                                        



             code = 379)                                         

 

             CHLORIDE (BEAKER) 109 meq/L           H            



             (test code = 382)                                        

 

             CO2 (BEAKER) 22 meq/L     22-29                     



             (test code = 355)                                        

 

             BLOOD UREA   44 mg/dL     7-21         H            



             NITROGEN (BEAKER)                                        



             (test code = 354)                                        

 

             CREATININE   4.03 mg/dL   0.57-1.25    H            



             (BEAKER) (test                                        



             code = 358)                                         

 

             GLUCOSE RANDOM 125 mg/dL           H            



             (BEAKER) (test                                        



             code = 652)                                         

 

             CALCIUM (BEAKER) 10.5 mg/dL   8.4-10.2     H            



             (test code = 697)                                        

 

             EGFR (BEAKER) 15                                      Interpretatio

n of eGFR



             (test code = mL/min/1.73                            values Stage De

scription



             1092)        sq m                                   Result G1 Leah

l or high



                                                                 >=90 G2 Mildly 

decreased



                                                                 60-89 G3a Mildl

y to



                                                                 moderately 45-5

9 G3b



                                                                 Moderately to s

everely



                                                                 30-44 G4 Severl

y decreased



                                                                 15-29 G5 Kidney

 failure



                                                                 <15Reported eGF

R is based



                                                                 on the CKD-EPI 





                                                                 equation that d

oes not use



                                                                 a race



                                                                 coefficientEsti

mated GFR



                                                                 is not as accur

ate as



                                                                 Creatinine Tamiko

latosha in



                                                                 predicting glom

erular



                                                                 filtration rate

. Estimated



                                                                 GFR is not appl

icable for



                                                                 dialysis patien

ts



 ID - SUNDAY GLIPID [id number] 04:19:26





             Test Item    Value        Reference Range Interpretation Comments

 

             TRIGLYCERIDES (BEAKER) (test code = 153 mg/dL                      

        



             540)                                                

 

             CHOLESTEROL (BEAKER) (test code = 115 mg/dL                        

      



             631)                                                

 

             HDL CHOLESTEROL (BEAKER) (test code 26 mg/dL                       

        



             = 976)                                              

 

             LDL CHOLESTEROL CALCULATED (BEAKER) 58 mg/dL                       

        



             (test code = 633)                                        



Triglyceride Reference Range: Low Risk  &lt;150 Borderline 150-199 High Risk 
200-499 Very High Risk &gt;=500Cholesterol Reference Range: Low Risk &lt;200 
Borderline 200-239 High Risk &gt;240HDL Cholesterol Reference Range: Low Risk 
&gt;=60 High Risk &lt;40LDL Cholesterol Reference Range: Optimal &lt;100 Near 
Optimal 100-129 Borderline 130-159 High 160-189 Very High &gt;=190  ID Izabella BRAND [id number] 04:19:25





             Test Item    Value        Reference Range Interpretation Comments

 

             MAGNESIUM (BEAKER) (test code = 2.1 mg/dL    1.6-2.6               

    



             627)                                                



 ID - SUNDAY [id number] 04:19:25





             Test Item    Value        Reference Range Interpretation Comments

 

             PHOSPHORUS (BEAKER) (test code = 4.8 mg/dL    2.3-4.7      H       

     



             604)                                                



 ID - SUNDAY [id number] 04:10:58





             Test Item    Value        Reference Range Interpretation Comments

 

             PARTIAL THROMBOPLASTIN TIME 52.7 seconds 22.5-36.0    H            



             (BEAKER) (test code = 760)                                        



HIGH SENSITIVITY TROPONIN -07-91 00:03:34





             Test Item    Value        Reference Range Interpretation Comments

 

             HIGH SENSITIVITY 717 pg/ml    See_Comment  HH            [Automated

 message]



             TROPONIN I (test code                                        The sy

stem which



             = 8624905)                                          generated this 

result



                                                                 transmitted ref

erence



                                                                 range: <=35. Th

e



                                                                 reference range

 was



                                                                 not used to int

erpret



                                                                 this result as



                                                                 normal/abnormal

.



 ID - BSThe  STAT High Sensitivity Troponin-I results should be
used in conjunctionwith other diagnostic information such as ECG, clinical 
observations and information, and patient symptoms to aid in the diagnosis of 
MI.[id number] 23:10:05





             Test Item    Value        Reference Range Interpretation Comments

 

             PARTIAL THROMBOPLASTIN TIME 48.3 seconds 22.5-36.0    H            



             (BEAKER) (test code = 760)                                        



POCT-GLUCOSE [id number] 21:09:03





             Test Item    Value        Reference Range Interpretation Comments

 

             POC-GLUCOSE METER 159 mg/dL           H            : TESTED A

T [facility] [address]



             (BEAKER) (test code =                                        CINTHYA ALDANA [facility],



             1538)                                               [id number]:



                                                                 /Techni

matilda ID



                                                                 = [id number] for GABRIEL VELÁZQUEZAK



CREATININE, RANDOM [id number] 18:38:16





             Test Item    Value        Reference Range Interpretation Comments

 

             CREATININE URINE (BEAKER) (test 87.9 mg/dL                         

    



             code = 375)                                         



Reference Range: No NormalsOperator ID - BSSODIUM, RANDOM [id number] 
18:38:16





             Test Item    Value        Reference Range Interpretation Comments

 

             SODIUM URINE (BEAKER) (test code = 33 meq/L                        

       



             243)                                                



Reference Range: No NormalsOperator ID - BSPOCT-GLUCOSE [id number] 17:19:46





             Test Item    Value        Reference Range Interpretation Comments

 

             POC-GLUCOSE METER 110 mg/dL                        : TESTED A

T [facility] [address]



             ([facility]) (test code =                                        [facility],



             1538)                                               [id number]:



                                                                 /Techni

matilda ID



                                                                 = [id number] for Blanka Freedman



HIGH SENSITIVITY TROPONIN -80-37 17:17:34





             Test Item    Value        Reference Range Interpretation Comments

 

             HIGH SENSITIVITY 1265 pg/ml   See_Comment  HH            [Automated

 message]



             TROPONIN I (test code                                        The sy

stem which



             = [id number])                                          generated this 

result



                                                                 transmitted ref

erence



                                                                 range: <=35. Th

e



                                                                 reference range

 was



                                                                 not used to int

erpret



                                                                 this result as



                                                                 normal/abnormal

.



 ID - BSTmabel  STAT High Sensitivity Troponin-I results should be
used in conjunctionwith other diagnostic information such as ECG, clinical 
observations and information, and patient symptoms to aid in the diagnosis of 
MI.[id number] 12:47:42





             Test Item    Value        Reference Range Interpretation Comments

 

             PARTIAL THROMBOPLASTIN TIME 26.1 seconds 22.5-36.0                 



             ([facility]) (test code = 760)                                        



POCT-GLUCOSE [id number] 12:17:30





             Test Item    Value        Reference Range Interpretation Comments

 

             POC-GLUCOSE METER 155 mg/dL           H            : TESTED A

T [facility] [address]



             ([facility]) (test code =                                        [facility],



             1538)                                               [address]:



                                                                 /Techni

matilda ID



                                                                 = [id number] for Blanka Freedman



HIGH SENSITIVITY TROPONIN  08:48:28





             Test Item    Value        Reference Range Interpretation Comments

 

             HIGH SENSITIVITY 778 pg/ml    See_Comment  HH            [Automated

 message]



             TROPONIN I (test code                                        The sy

stem which



             = [id number])                                          generated this 

result



                                                                 transmitted ref

erence



                                                                 range: <=35. Th

e



                                                                 reference range

 was



                                                                 not used to int

erpret



                                                                 this result as



                                                                 normal/abnormal

.



 ID - ROCKAYA LThe  STAT High Sensitivity Troponin-I results 
should be used in conjunction with other diagnostic information such as ECG, 
clinical observations and information, and patient symptoms to aid in the 
diagnosis of MI.HEMOGLOBIN [id number] 08:45:42





             Test Item    Value        Reference Range Interpretation Comments

 

             HEMOGLOBIN A1C 5.8 %        See_Comment  H             [Automated m

essage]



             ELECTROPHORESIS (BEAKER)                                        The

 system which



             (test code = 3811)                                        generated

 this result



                                                                 transmitted ref

erence



                                                                 range: <=5.6%. 

The



                                                                 reference range

 was



                                                                 not used to int

erpret



                                                                 this result as



                                                                 normal/abnormal

.



"The A1c is measured using a [facility]-certified method. HbA1c value equal to or 
greater than 6.5% as thediagnosis cutoff for diabetes. An HbA1c value of 5.7-
6.4% indicates increased risk for diabetes (prediabetes)." ID - ADMPOCT-
GLUCOSE [id number] 08:25:32





             Test Item    Value        Reference Range Interpretation Comments

 

             POC-GLUCOSE METER 105 mg/dL                        : TESTED A

T [facility] [address]



             (BEAKER) (test code =                                        [place],



             [address]38)                                               [id number]:



                                                                 /Techni

matilda ID



                                                                 = [id number] for Blanka Freedman



BASIC METABOLIC [id number] 06:24:38





             Test Item    Value        Reference Range Interpretation Comments

 

             SODIUM (BEAKER) 141 meq/L    136-145                   



             (test code = 381)                                        

 

             POTASSIUM    3.8 meq/L    3.5-5.1                   



             (BEAKER) (test                                        



             code = 379)                                         

 

             CHLORIDE (BEAKER) 110 meq/L           H            



             (test code = 382)                                        

 

             CO2 (BEAKER) 21 meq/L     22-29        L            



             (test code = 355)                                        

 

             BLOOD UREA   42 mg/dL     7-21         H            



             NITROGEN (BEAKER)                                        



             (test code = 354)                                        

 

             CREATININE   4.20 mg/dL   0.57-1.25    H            



             (BEAKER) (test                                        



             code = 358)                                         

 

             GLUCOSE RANDOM 96 mg/dL                         



             (BEAKER) (test                                        



             code = 652)                                         

 

             CALCIUM (BEAKER) 10.0 mg/dL   8.4-10.2                  



             (test code = 697)                                        

 

             EGFR (BEAKER) 15                                      Interpretatio

n of eGFR



             (test code = mL/min/1.73                            values Stage De

scription



             1092)        sq m                                   Result G1 Leah

l or high



                                                                 >=90 G2 Mildly 

decreased



                                                                 60-89 G3a Mildl

y to



                                                                 moderately 45-5

9 G3b



                                                                 Moderately to s

everely



                                                                 30-44 G4 Severl

y decreased



                                                                 15-29 G5 Kidney

 failure



                                                                 <15Reported eGF

R is based



                                                                 on the CKD-EPI 

1



                                                                 equation that d

oes not use



                                                                 a race



                                                                 coefficientEsti

mated GFR



                                                                 is not as accur

ate as



                                                                 Creatinine Tamiko

latosha in



                                                                 predicting glom

erular



                                                                 filtration rate

. Estimated



                                                                 GFR is not appl

icable for



                                                                 dialysis patien

ts



 ID - JULIA LHEPATIC FUNCTION [id number] 06:18:53





             Test Item    Value        Reference Range Interpretation Comments

 

             TOTAL PROTEIN (BEAKER) (test code = 6.6 gm/dL    6.0-8.3           

        



             770)                                                

 

             ALBUMIN (BEAKER) (test code = 1145) 3.9 g/dL     3.5-5.0           

        

 

             BILIRUBIN TOTAL (BEAKER) (test code 0.3 mg/dL    0.2-1.2           

        



             = 377)                                              

 

             BILIRUBIN DIRECT (BEAKER) (test 0.1 mg/dL    0.1-0.5               

    



             code = 706)                                         

 

             ALKALINE PHOSPHATASE (BEAKER) (test 82 U/L                   

        



             code = 346)                                         

 

             AST (SGOT) (BEAKER) (test code = 14 U/L       5-34                 

     



             353)                                                

 

             ALT (SGPT) (BEAKER) (test code = 9 U/L        6-55                 

     



             347)                                                



 ID - JULIA [id number] 06:18:52





             Test Item    Value        Reference Range Interpretation Comments

 

             MAGNESIUM (BEAKER) (test code = 1.5 mg/dL    1.6-2.6      L        

    



             627)                                                



 ID - ROCKLEENA [id number] 06:18:52





             Test Item    Value        Reference Range Interpretation Comments

 

             PHOSPHORUS (BEAKER) (test code = 4.7 mg/dL    2.3-4.7              

     



             604)                                                



 ID - JULIA LHIGH SENSITIVITY TROPONIN -81-54 05:47:38





             Test Item    Value        Reference Range Interpretation Comments

 

             HIGH SENSITIVITY 356 pg/ml    See_Comment  H             [Automated

 message]



             TROPONIN I (test code                                        The sy

stem which



             = [id number])                                          generated this 

result



                                                                 transmitted ref

erence



                                                                 range: <=35. Th

e



                                                                 reference range

 was



                                                                 not used to int

erpret



                                                                 this result as



                                                                 normal/abnormal

.



 ID - ROCKLEENA LThe  STAT High Sensitivity Troponin-I results 
should be used in conjunction with other diagnostic information such as ECG, 
clinical observations and information, and patient symptoms to aid in the 
diagnosis of MI.PROTHROMBIN TIME/[id number] 05:39:34





             Test Item    Value        Reference Range Interpretation Comments

 

             PROTIME (BEAKER) 14.2 seconds 11.9-14.2                 



             (test code = 759)                                        

 

             INR (BEAKER) (test 1.17         See_Comment                [Automat

ed message]



             code = 370)                                         The system [employer]



                                                                 generated this 

result



                                                                 transmitted ref

erence



                                                                 range: <=5.90. 

The



                                                                 reference range

 was



                                                                 not used to int

erpret



                                                                 this result as



                                                                 normal/abnormal

.



RECOMMENDED COUMADIN/WARFARIN INR THERAPY RANGESSTANDARD DOSE: 2.0 - 3.0 
Includes: PROPHYLAXIS for venous thrombosis, systemic embolization; TREATMENT 
for venous thrombosis and/or pulmonary embolus.HIGH RISK: Target INR is 2.5-3.5 
for patients with mechanical heart valves.CBC W/PLT COUNT &amp; AUTO 
[id number] 05:21:01





             Test Item    Value        Reference Range Interpretation Comments

 

             WHITE BLOOD CELL COUNT (BEAKER) 8.6 K/ L     3.5-10.5              

    



             (test code = 775)                                        

 

             RED BLOOD CELL COUNT (BEAKER) 3.82 M/ L    4.63-6.08    L          

  



             (test code = 761)                                        

 

             HEMOGLOBIN (BEAKER) (test code = 10.4 GM/DL   13.7-17.5    L       

     



             410)                                                

 

             HEMATOCRIT (BEAKER) (test code = 31.9 %       40.1-51.0    L       

     



             411)                                                

 

             MEAN CORPUSCULAR VOLUME (BEAKER) 83.5 fL      79.0-92.2            

     



             (test code = 753)                                        

 

             MEAN CORPUSCULAR HEMOGLOBIN 27.2 pg      25.7-32.2                 



             (BEAKER) (test code = 751)                                        

 

             MEAN CORPUSCULAR HEMOGLOBIN CONC 32.6 GM/DL   32.3-36.5            

     



             (BEAKER) (test code = 752)                                        

 

             RED CELL DISTRIBUTION WIDTH 14.1 %       11.6-14.4                 



             (BEAKER) (test code = 412)                                        

 

             PLATELET COUNT (BEAKER) (test 187 K/CU MM  150-450                 

  



             code = 756)                                         

 

             MEAN PLATELET VOLUME (BEAKER) 10.1 fL      9.4-12.4                

  



             (test code = 754)                                        

 

             NUCLEATED RED BLOOD CELLS 0 /100 WBC   0-0                       



             (BEAKER) (test code = 413)                                        

 

             NEUTROPHILS RELATIVE PERCENT 72 %                                  

 



             (BEAKER) (test code = 429)                                        

 

             LYMPHOCYTES RELATIVE PERCENT 14 %                                  

 



             (BEAKER) (test code = 430)                                        

 

             MONOCYTES RELATIVE PERCENT 9 %                                    



             (BEAKER) (test code = 431)                                        

 

             EOSINOPHILS RELATIVE PERCENT 4 %                                   

 



             (BEAKER) (test code = 432)                                        

 

             BASOPHILS RELATIVE PERCENT 1 %                                    



             (BEAKER) (test code = 437)                                        

 

             NEUTROPHILS ABSOLUTE COUNT 6.16 K/ L    1.78-5.38    H            



             (BEAKER) (test code = 670)                                        

 

             LYMPHOCYTES ABSOLUTE COUNT 1.22 K/ L    1.32-3.57    L            



             (BEAKER) (test code = 414)                                        

 

             MONOCYTES ABSOLUTE COUNT (BEAKER) 0.80 K/ L    0.30-0.82           

      



             (test code = 415)                                        

 

             EOSINOPHILS ABSOLUTE COUNT 0.36 K/ L    0.04-0.54                 



             (BEAKER) (test code = 416)                                        

 

             BASOPHILS ABSOLUTE COUNT (BEAKER) 0.05 K/ L    0.01-0.08           

      



             (test code = 417)                                        

 

             IMMATURE GRANULOCYTES-RELATIVE 0 %          0-1                    

   



             PERCENT (BEAKER) (test code =                                      

  



             2801)                                               



POCT-GLUCOSE [id number] 00:06:35





             Test Item    Value        Reference Range Interpretation Comments

 

             POC-GLUCOSE METER 138 mg/dL           H            : TESTED A

T [facility] [address]



             (BEAKER) (test code =                                        CINTHYA ALDANA [facility],



             1538)                                               [address]:



                                                                 /Techni

matilda ID



                                                                 = [id number] for HALEIGH VELÁZQUEZ



SARS-COV2/RT-PCR ([facility])2022 22:57:17





             Test Item    Value        Reference Range Interpretation Comments

 

             SARS-COV2/RT-PCR Negative     Negative                  The SARS-Co

V-2 target



             (test code =                                        nucleic acids a

re not



             [id number])                                            detected in thi

s specimen.



                                                                 Negative result

s do not



                                                                 preclude SARS-C

oV-2



                                                                 infection and s

hould not be



                                                                 used as the sol

e basis for



                                                                 patient managem

ent



                                                                 decisions. Nega

tive results



                                                                 must be combine

d with



                                                                 clinical observ

ations,



                                                                 patient history

, and



                                                                 epidemiological

 information.



                                                                 A false negativ

e result may



                                                                 occur if a spec

imen is



                                                                 improperly noam

ected,



                                                                 transported or 

handled. This



                                                                 SARS CoV-2 test

 is a rapid,



                                                                 real-time RT-PC

R test



                                                                 intended for th

e qualitative



                                                                 detection of nu

cleic acid



                                                                 from SARS-CoV-2

 in a



                                                                 nasopharyngeal 

swab specimen



                                                                 collected from 

individuals



                                                                 suspected of CO

VID-19 by



                                                                 their healthcar

e provider.



This test has been authorized by FDA under an EUA for use by authorized 
laboratories. This test is only authorized for the duration of the declaration 
that circumstances exist justifying the authorization of emergency use of in 
vitro diagnostic tests for detection and/or diagnosis of COVID-19 under Section 
564(b)(1) of the Federal Food, Drug and Cosmetic Act, 21 U.S.C. 360bbb-3(b)(1), 
unless the authorization is terminated or revoked sooner. Fact Sheet for 
Healthcare Providers: https://www.[employer]
m/Documents/Xpert%20Xpress%20SARS%20CoV-2/Fact%20Sheets/302-3802%01SAVD-UTG-4%20

HEALTHCARE%20PROVIDERS%20FACT%20SHEET.pdf Fact Sheet for Healthcare Patients: 
https://www.[employer]/Documents/Xpert%20Xp
ress%20SARS%20CoV-2/Fact%20Sheets/302-3801%38BCOJ-CSO-2%20PATIENT%20FACT%20SHEET

.pdfURINALYSIS W/ REFLEX URINE [id number] 20:40:35





             Test Item    Value        Reference Range Interpretation Comments

 

             COLOR (BEAKER) (test code = 470) Yellow                            

     

 

             CLARITY (BEAKER) (test code = 469) Clear                           

       

 

             SPECIFIC GRAVITY UA (BEAKER) (test 1.010        1.001-1.035        

       



             code = 468)                                         

 

             PH UA (BEAKER) (test code = 467) 5.5          5.0-8.0              

     

 

             PROTEIN UA (BEAKER) (test code = Negative     Negative             

     



             464)                                                

 

             GLUCOSE UA (BEAKER) (test code = Negative     Negative             

     



             365)                                                

 

             KETONES UA (BEAKER) (test code = Negative     Negative             

     



             371)                                                

 

             BILIRUBIN UA (BEAKER) (test code = Negative     Negative           

       



             462)                                                

 

             BLOOD UA (BEAKER) (test code = 461) Negative     Negative          

        

 

             NITRITE UA (BEAKER) (test code = Negative     Negative             

     



             465)                                                

 

             LEUKOCYTE ESTERASE UA (BEAKER) Negative     Negative               

   



             (test code = 466)                                        

 

             UROBILINOGEN UA (BEAKER) (test code 0.2 mg/dL    0.2-1.0           

        



             = 463)                                              

 

             BACTERIA (BEAKER) (test code = 517) Few                            

        

 

             RBC UA-MANUAL (BEAKER) (test code = <5 /HPF                        

        



             1659)                                               

 

             WBC UA-MANUAL (BEAKER) (test code = <5 /HPF                        

        



             1661)                                               

 

             SQUAMOUS EPITHELIAL MANUAL (BEAKER) <5 /HPF                        

        



             (test code = 1663)                                        

 

             SOURCE(BEAKER) (test code = 2795)                                  

      



RAD, CHEST, 2 [id number] 20:22:00Reason for exam:-&gt;HYPERTENSIONReason 
for exam:-&gt;DIZZINESSShould this be performed at the bedside?-&gt;No
************************************************************[facility] : 1955 Sex: 
M************************************************************FINAL REPORT 
PATIENT ID: [id number] CLINICAL HISTORY: HYPERTENSIONDIZZINESS 3 upright images of
the chest are submitted. COMPARISON:2020 The cardiac silhouette is within 
normal limits for size. The patient has undergone previous CABG. There is no 
focal consolidation, pleural effusion, pneumothorax or evidence of overt 
pulmonary edema. There is no acute bony abnormality. IMPRESSION: No acute 
abnormality. Signed: Rene Alicea Verified Date/Time: 2022 
20:22:26 Electronically signed by: RENE ALICEA M.D. on 2022 08:22 PM
RAPID TROPONIN -[id number] 19:05:22





             Test Item    Value        Reference Range Interpretation Comments

 

             RAPID TROPONIN I (BEAKER) (test code < ng/mL      <0.05            

         



             = 1483)                                             



BASIC METABOLIC [id number] 19:02:06





             Test Item    Value        Reference Range Interpretation Comments

 

             SODIUM (BEAKER) 138 meq/L    135-148                   



             (test code = 381)                                        

 

             POTASSIUM    4.0 meq/L    3.6-5.5                   



             (BEAKER) (test                                        



             code = 379)                                         

 

             CHLORIDE (BEAKER) 103 meq/L                        



             (test code = 382)                                        

 

             CO2 (BEAKER) 21 meq/L     24-32        L            



             (test code = 355)                                        

 

             BLOOD UREA   40 mg/dL     10-26        H            



             NITROGEN (BEAKER)                                        



             (test code = 354)                                        

 

             CREATININE   4.26 mg/dL   0.50-1.20    H            



             (BEAKER) (test                                        



             code = 358)                                         

 

             GLUCOSE RANDOM 89 mg/dL                         



             (BEAKER) (test                                        



             code = 652)                                         

 

             CALCIUM (BEAKER) 9.7 mg/dL    8.5-10.5                  



             (test code = 697)                                        

 

             EGFR (BEAKER) 14                                      Interpretatio

n of eGFR



             (test code = mL/min/1.73                            values Stage De

scription



             1092)        sq m                                   Result G1 Leah

l or high



                                                                 >=90 G2 Mildly 

decreased



                                                                 60-89 G3a  Mild

ly to



                                                                 moderately 45-5

9 G3b



                                                                 Moderately to s

everely



                                                                 30-44 G4 Severl

y decreased



                                                                 15-29 G5 Kidney

 failure



                                                                 <15Reported eGF

R is based



                                                                 on the CKD-EPI 





                                                                 equation that d

oes not use



                                                                 a race



                                                                 coefficientEsti

mated GFR



                                                                 is not as accur

ate as



                                                                 Creatinine Tamiko

latosha in



                                                                 predicting glom

erular



                                                                 filtration rate

. Estimated



                                                                 GFR is not appl

icable for



                                                                 dialysis patien

ts



CBC W/PLT COUNT &amp; AUTO [id number] 18:53:52





             Test Item    Value        Reference Range Interpretation Comments

 

             WHITE BLOOD CELL COUNT (BEAKER) 7.7 K/ L     4.0-10.0              

    



             (test code = 775)                                        

 

             RED BLOOD CELL COUNT (BEAKER) 3.89 M/ L    4.20-5.80    L          

  



             (test code = 761)                                        

 

             HEMOGLOBIN (BEAKER) (test code = 10.4 GM/DL   13.0-16.8    L       

     



             410)                                                

 

             HEMATOCRIT (BEAKER) (test code = 32.1 %       40.0-50.0    L       

     



             411)                                                

 

             MEAN CORPUSCULAR VOLUME (BEAKER) 82.4 fL      82.0-98.0            

     



             (test code = 753)                                        

 

             MEAN CORPUSCULAR HEMOGLOBIN 26.8 pg      27.0-33.0    L            



             (BEAKER) (test code = 751)                                        

 

             MEAN CORPUSCULAR HEMOGLOBIN CONC 32.5 GM/DL   32.0-36.0            

     



             (BEAKER) (test code = 752)                                        

 

             RED CELL DISTRIBUTION WIDTH 14.3 %       10.3-14.2    H            



             (BEAKER) (test code = 412)                                        

 

             PLATELET COUNT (BEAKER) (test 195 K/CU MM  150-430                 

  



             code = 756)                                         

 

             MEAN PLATELET VOLUME (BEAKER) 8.1 fL       6.5-10.5                

  



             (test code = 754)                                        

 

             NEUTROPHILS RELATIVE PERCENT 66 %                                  

 



             (BEAKER) (test code = 429)                                        

 

             LYMPHOCYTES RELATIVE PERCENT 17 %                                  

 



             (BEAKER) (test code = 430)                                        

 

             MONOCYTES RELATIVE PERCENT 10 %                                   



             (BEAKER) (test code = 431)                                        

 

             EOSINOPHILS RELATIVE PERCENT 6 %                                   

 



             (BEAKER) (test code = 432)                                        

 

             BASOPHILS RELATIVE PERCENT 1 %                                    



             (BEAKER) (test code = 437)                                        

 

             NEUTROPHILS ABSOLUTE COUNT 5.12 K/ L    1.80-8.00                 



             (BEAKER) (test code = 670)                                        

 

             LYMPHOCYTES ABSOLUTE COUNT 1.33 K/ L    1.48-4.50    L            



             (BEAKER) (test code = 414)                                        

 

             MONOCYTES ABSOLUTE COUNT (BEAKER) 0.80 K/ L    0.00-1.30           

      



             (test code = 415)                                        

 

             EOSINOPHILS ABSOLUTE COUNT 0.45 K/ L    0.00-0.50                 



             (BEAKER) (test code = 416)                                        

 

             BASOPHILS ABSOLUTE COUNT (BEAKER) 0.04 K/ L    0.00-0.20           

      



             (test code = 417)                                        



FB Removal- [id number] 09:17:00Blanche Rosenberg MD ? ? 2021 ?4:17 
AMFB Removal- OrificePerformed by: Blanche Rosenberg MDAuthorized by: Blanche Rosenberg MD Consent: ?Consent obtained: ?Verbal ?Consent given by: ?Patient 
?Risksdiscussed: ?Bleeding, damage to surrounding structures, incomplete 
removal, infection, need for surgical removal, pain, TM perforation and 
worsening of condition ?Alternatives discussed: ?No treatmentLocation: 
?Location: ?Ear ?Ear location: ?L earPre-procedure details: ?Imaging: 
?NoneAnesthesia (see MAR for exact dosages): ?Topical anesthetic: ?Lidocaine 
gelProcedure details: ?Localization method: ?Direct visualization ?Removal 
mechanism: ?Alligator forceps, suction and irrigation ?Procedure complexity: 
?Complex ?Foreign bodies recovered: ?1 ?Description: ?[place] [employer] cover 
?Intact foreign body removal: yes ?Post-procedure details: ?Confirmation: ?No 
additional foreign bodies on visualization ?Patient tolerance of procedure: 
?Tolerated well, no immediate complicationsUnScenic Mountain Medical Center[U]
 XRAY KNEE 4 OR MORE VWS BILATERAL [id number] 08:23:00Images acquired, not 
reported on this accession number.UT PhysiciansPOCT-GLUCOSE [id number] 
12:11:00





             Test Item    Value        Reference Range Interpretation Comments

 

             POC-GLUCOSE METER 253 mg/dL           H            : TESTED A

T [facility] [address]



             (BEAKER) (test code =                                        [facility]

[address] [facility],



             1538)                                               [id number]:



                                                                 /Techni

matilda ID



                                                                 = [id number] for BIN

NTER,



                                                                 HIWITHA



POCT-GLUCOSE [id number] 07:40:00





             Test Item    Value        Reference Range Interpretation Comments

 

             POC-GLUCOSE METER 160 mg/dL           H            : TESTED A

T [facility] [address]



             (BEAKER) (test code =                                        [facility]

[address] [facility],



             [address]38)                                               [id number]:



                                                                 /Techni

matilda ID



                                                                 = [id number] for HU

NTER,



                                                                 HIWITHA



BASIC METABOLIC [id number] 06:13:00





             Test Item    Value        Reference Range Interpretation Comments

 

             SODIUM (BEAKER) 139 meq/L    136-145                   



             (test code = 381)                                        

 

             POTASSIUM (BEAKER) 3.9 meq/L    3.5-5.1                   



             (test code = 379)                                        

 

             CHLORIDE (BEAKER) 109 meq/L           H            



             (test code = 382)                                        

 

             CO2 (BEAKER) (test 17 meq/L     22-29        L            



             code = 355)                                         

 

             BLOOD UREA NITROGEN 19 mg/dL     7-21                      



             (BEAKER) (test code                                        



             = 354)                                              

 

             CREATININE (BEAKER) 1.57 mg/dL   0.57-1.25    H            



             (test code = 358)                                        

 

             GLUCOSE RANDOM 154 mg/dL           H            



             (BEAKER) (test code                                        



             = 652)                                              

 

             CALCIUM (BEAKER) 8.9 mg/dL    8.4-10.2                  



             (test code = 697)                                        

 

             EGFR (BEAKER) (test 44 mL/min/1.73                           ESTIMA

TERRENCE GFR IS



             code = 1092) sq m                                   NOT AS ACCURATE

 AS



                                                                 CREATININE



                                                                 CLEARANCE IN



                                                                 PREDICTING



                                                                 GLOMERULAR



                                                                 FILTRATION RATE

.



                                                                 ESTIMATED GFR I

S



                                                                 NOT APPLICABLE 

FOR



                                                                 DIALYSIS PATIEN

TS.



 ID - PATIENCE [facility] (HEMOGRAM ONLY)2020 05:34:00





             Test Item    Value        Reference Range Interpretation Comments

 

             WHITE BLOOD CELL COUNT (BEAKER) 10.0 K/ L    3.5-10.5              

    



             (test code = 775)                                        

 

             RED BLOOD CELL COUNT (BEAKER) 4.43 M/ L    4.63-6.08    L          

  



             (test code = 761)                                        

 

             HEMOGLOBIN (BEAKER) (test code = 12.2 GM/DL   13.7-17.5    L       

     



             410)                                                

 

             HEMATOCRIT (BEAKER) (test code = 38.8 %       40.1-51.0    L       

     



             411)                                                

 

             MEAN CORPUSCULAR VOLUME (BEAKER) 87.6 fL      79.0-92.2            

     



             (test code = 753)                                        

 

             MEAN CORPUSCULAR HEMOGLOBIN 27.5 pg      25.7-32.2                 



             (BEAKER) (test code = 751)                                        

 

             MEAN CORPUSCULAR HEMOGLOBIN CONC 31.4 GM/DL   32.3-36.5    L       

     



             (BEAKER) (test code = 752)                                        

 

             RED CELL DISTRIBUTION WIDTH 13.2 %       11.6-14.4                 



             (BEAKER) (test code = 412)                                        

 

             PLATELET COUNT (BEAKER) (test 172 K/CU MM  150-450                 

  



             code = 756)                                         

 

             MEAN PLATELET VOLUME (BEAKER) 10.4 fL      9.4-12.4                

  



             (test code = 754)                                        

 

             NUCLEATED RED BLOOD CELLS 0 /100 WBC   0-0                       



             (BEAKER) (test code = 413)                                        



POCT-GLUCOSE [id number] 21:23:00





             Test Item    Value        Reference Range Interpretation Comments

 

             POC-GLUCOSE METER 227 mg/dL           H            : TESTED A

T [facility] [address]



             (AKER) (test code =                                        [facility],



             153)                                               [id number]:



                                                                 /Techni

matilda ID



                                                                 = [id number] for KOLTON MORGAN



POCT-GLUCOSE [id number] 16:26:00





             Test Item    Value        Reference Range Interpretation Comments

 

             POC-GLUCOSE METER 188 mg/dL           H            : TESTED A

T [facility] [address]



             (BEAKER) (test code =                                        [facility],



             153)                                               [address]:



                                                                 /Techni

matilda ID



                                                                 = [id number] for [place]

LLIAMS,



                                                                 TYNEKA



POCT-GLUCOSE [id number] 11:59:00





             Test Item    Value        Reference Range Interpretation Comments

 

             POC-GLUCOSE METER 172 mg/dL           H            : TESTED A

T [facility] [address]



             (BEAKER) (test code =                                        [facility],



             153)                                               [id number]:



                                                                 /Techni

matilda ID



                                                                 = [id number] for [place]

LLIAMS,



                                                                 TYNEKA



POCT-GLUCOSE [id number] 08:11:00





             Test Item    Value        Reference Range Interpretation Comments

 

             POC-GLUCOSE METER 147 mg/dL           H            : TESTED A

T [facility] [address]



             (BEAKER) (test code =                                        [facility] [place],



             [address]38)                                               [address]:



                                                                 /Techni

matilda ID



                                                                 = [id number] for DELORES DIA



[id number] 04:10:00





             Test Item    Value        Reference Range Interpretation Comments

 

             MAGNESIUM (BEAKER) (test code = 1.9 mg/dL    1.6-2.6               

    



             627)                                                



 SHALONDA MORIN MBASIC METABOLIC [id number] 04:10:00





             Test Item    Value        Reference Range Interpretation Comments

 

             SODIUM (BEAKER) 139 meq/L    136-145                   



             (test code = 381)                                        

 

             POTASSIUM (BEAKER) 4.0 meq/L    3.5-5.1                   



             (test code = 379)                                        

 

             CHLORIDE (BEAKER) 108 meq/L           H            



             (test code = 382)                                        

 

             CO2 (BEAKER) (test 22 meq/L     22-29                     



             code = 355)                                         

 

             BLOOD UREA NITROGEN 18 mg/dL     7-21                      



             (BEAKER) (test code                                        



             = 354)                                              

 

             CREATININE (BEAKER) 1.36 mg/dL   0.57-1.25    H            



             (test code = 358)                                        

 

             GLUCOSE RANDOM 126 mg/dL           H            



             (BEAKER) (test code                                        



             = 652)                                              

 

             CALCIUM (BEAKER) 9.1 mg/dL    8.4-10.2                  



             (test code = 697)                                        

 

             EGFR (BEAKER) (test 52 mL/min/1.73                           ESTIMA

TERRENCE GFR IS



             code = 1092) sq m                                   NOT AS ACCURATE

 AS



                                                                 CREATININE



                                                                 CLEARANCE IN



                                                                 PREDICTING



                                                                 GLOMERULAR



                                                                 FILTRATION RATE

.



                                                                 ESTIMATED GFR I

S



                                                                 NOT APPLICABLE 

FOR



                                                                 DIALYSIS PATIEN

TS.



 ID Izabella MORIN [facility] (HEMOGRAM ONLY)2020 03:46:00





             Test Item    Value        Reference Range Interpretation Comments

 

             WHITE BLOOD CELL COUNT (BEAKER) 10.3 K/ L    3.5-10.5              

    



             (test code = 775)                                        

 

             RED BLOOD CELL COUNT (BEAKER) 4.45 M/ L    4.63-6.08    L          

  



             (test code = 761)                                        

 

             HEMOGLOBIN (BEAKER) (test code = 12.3 GM/DL   13.7-17.5    L       

     



             410)                                                

 

             HEMATOCRIT (BEAKER) (test code = 39.3 %       40.1-51.0    L       

     



             411)                                                

 

             MEAN CORPUSCULAR VOLUME (BEAKER) 88.3 fL      79.0-92.2            

     



             (test code = 753)                                        

 

             MEAN CORPUSCULAR HEMOGLOBIN 27.6 pg      25.7-32.2                 



             (BEAKER) (test code = 751)                                        

 

             MEAN CORPUSCULAR HEMOGLOBIN CONC 31.3 GM/DL   32.3-36.5    L       

     



             (BEAKER) (test code = 752)                                        

 

             RED CELL DISTRIBUTION WIDTH 13.2 %       11.6-14.4                 



             (BEAKER) (test code = 412)                                        

 

             PLATELET COUNT (BEAKER) (test 167 K/CU MM  150-450                 

  



             code = 756)                                         

 

             MEAN PLATELET VOLUME (BEAKER) 10.2 fL      9.4-12.4                

  



             (test code = 754)                                        

 

             NUCLEATED RED BLOOD CELLS 0 /100 WBC   0-0                       



             (BEAKER) (test code = 413)                                        



POCT-GLUCOSE [id number] 21:53:00





             Test Item    Value        Reference Range Interpretation Comments

 

             POC-GLUCOSE METER 167 mg/dL           H            : TESTED A

T [facility] [address]



             (BEAKER) (test code =                                        [facility],



             [facility]8)                                               [address]:



                                                                 /Techni

matilda ID



                                                                 = [id number] for KE

BE,



                                                                 SAUDATU



POCT-GLUCOSE [id number] 16:06:00





             Test Item    Value        Reference Range Interpretation Comments

 

             POC-GLUCOSE METER 135 mg/dL           H            : TESTED A

T [facility] [address]



             (BEAKER) (test code =                                        [facility],



             1538)                                               [address]:



                                                                 /Techni

matilda ID



                                                                 = [id number] for BE

RNABE,



                                                                 CHRISTINE



POCT-GLUCOSE [id number] 12:15:00





             Test Item    Value        Reference Range Interpretation Comments

 

             POC-GLUCOSE METER 211 mg/dL           H            : TESTED A

T [facility] [address]



             (BEAKER) (test code =                                        [facility],



             1538)                                               [address]:



                                                                 /Techni

matilda ID



                                                                 = [id number] for BE

RNABE,



                                                                 CHRISTINE



POCT-GLUCOSE [id number] 08:25:00





             Test Item    Value        Reference Range Interpretation Comments

 

             POC-GLUCOSE METER 102 mg/dL                        : TESTED A

T [facility] [address]



             (BEAKER) (test code =                                        [facility],



             1538)                                               [address]:



                                                                 /Techni

matilda ID



                                                                 = [id number] for BE

RNABE,



                                                                 CHRISTINE



[id number] 06:07:00





             Test Item    Value        Reference Range Interpretation Comments

 

             MAGNESIUM (BEAKER) (test code = 2.0 mg/dL    1.6-2.6               

    



             627)                                                



 ID - PATIENCE MBASIC METABOLIC [id number] 06:07:00





             Test Item    Value        Reference Range Interpretation Comments

 

             SODIUM (BEAKER) 140 meq/L    136-145                   



             (test code = 381)                                        

 

             POTASSIUM (BEAKER) 4.2 meq/L    3.5-5.1                   



             (test code = 379)                                        

 

             CHLORIDE (BEAKER) 111 meq/L           H            



             (test code = 382)                                        

 

             CO2 (BEAKER) (test 21 meq/L     22-29        L            



             code = 355)                                         

 

             BLOOD UREA NITROGEN 17 mg/dL     7-21                      



             (BEAKER) (test code                                        



             = 354)                                              

 

             CREATININE (BEAKER) 1.20 mg/dL   0.57-1.25                 



             (test code = 358)                                        

 

             GLUCOSE RANDOM 98 mg/dL                         



             (BEAKER) (test code                                        



             = 652)                                              

 

             CALCIUM (BEAKER) 9.1 mg/dL    8.4-10.2                  



             (test code = 697)                                        

 

             EGFR (BEAKER) (test 60 mL/min/1.73                           ESTIMA

TERRENCE GFR IS



             code = 1092) sq m                                   NOT AS ACCURATE

 AS



                                                                 CREATININE



                                                                 CLEARANCE IN



                                                                 PREDICTING



                                                                 GLOMERULAR



                                                                 FILTRATION RATE

.



                                                                 ESTIMATED GFR I

S



                                                                 NOT APPLICABLE 

FOR



                                                                 DIALYSIS PATIEN

TS.



 ID - PATIENCE [facility] W/PLT COUNT &amp; AUTO [id number] 05:38:00





             Test Item    Value        Reference Range Interpretation Comments

 

             WHITE BLOOD CELL COUNT (BEAKER) 10.1 K/ L    3.5-10.5              

    



             (test code = 775)                                        

 

             RED BLOOD CELL COUNT (BEAKER) 4.39 M/ L    4.63-6.08    L          

  



             (test code = 761)                                        

 

             HEMOGLOBIN (BEAKER) (test code = 12.2 GM/DL   13.7-17.5    L       

     



             410)                                                

 

             HEMATOCRIT (BEAKER) (test code = 38.8 %       40.1-51.0    L       

     



             411)                                                

 

             MEAN CORPUSCULAR VOLUME (BEAKER) 88.4 fL      79.0-92.2            

     



             (test code = 753)                                        

 

             MEAN CORPUSCULAR HEMOGLOBIN 27.8 pg      25.7-32.2                 



             (BEAKER) (test code = 751)                                        

 

             MEAN CORPUSCULAR HEMOGLOBIN CONC 31.4 GM/DL   32.3-36.5    L       

     



             (BEAKER) (test code = 752)                                        

 

             RED CELL DISTRIBUTION WIDTH 13.2 %       11.6-14.4                 



             (BEAKER) (test code = 412)                                        

 

             PLATELET COUNT (BEAKER) (test 177 K/CU MM  150-450                 

  



             code = 756)                                         

 

             MEAN PLATELET VOLUME (BEAKER) 10.5 fL      9.4-12.4                

  



             (test code = 754)                                        

 

             NUCLEATED RED BLOOD CELLS 0 /100 WBC   0-0                       



             (BEAKER) (test code = 413)                                        

 

             NEUTROPHILS RELATIVE PERCENT 71 %                                  

 



             (BEAKER) (test code = 429)                                        

 

             LYMPHOCYTES RELATIVE PERCENT 15 %                                  

 



             (BEAKER) (test code = 430)                                        

 

             MONOCYTES RELATIVE PERCENT 8 %                                    



             (BEAKER) (test code = 431)                                        

 

             EOSINOPHILS RELATIVE PERCENT 5 %                                   

 



             (BEAKER) (test code = 432)                                        

 

             BASOPHILS RELATIVE PERCENT 1 %                                    



             (BEAKER) (test code = 437)                                        

 

             NEUTROPHILS ABSOLUTE COUNT 7.17 K/ L    1.78-5.38    H            



             (BEAKER) (test code = 670)                                        

 

             LYMPHOCYTES ABSOLUTE COUNT 1.51 K/ L    1.32-3.57                 



             (BEAKER) (test code = 414)                                        

 

             MONOCYTES ABSOLUTE COUNT (BEAKER) 0.85 K/ L    0.30-0.82    H      

      



             (test code = 415)                                        

 

             EOSINOPHILS ABSOLUTE COUNT 0.45 K/ L    0.04-0.54                 



             (BEAKER) (test code = 416)                                        

 

             BASOPHILS ABSOLUTE COUNT (BEAKER) 0.06 K/ L    0.01-0.08           

      



             (test code = 417)                                        

 

             IMMATURE GRANULOCYTES-RELATIVE 0 %          0-1                    

   



             PERCENT (BEAKER) (test code =                                      

  



             2801)                                               



[id number] 00:24:00





             Test Item    Value        Reference Range Interpretation Comments

 

             ACTIVATED CLOTTING TIME 109 sec                                : 74

-137 seconds,



             (BEAKER) (test code =                                        [place]

ne: TESTED AT



             441)                                                [facility] [address] [facility], [address]

30:



                                                                 /Techni

matilda ID



                                                                 = [id number] for



                                                                 GERONIMO GIRON

MI



POCT-GLUCOSE [id number] 20:30:00





             Test Item    Value        Reference Range Interpretation Comments

 

             POC-GLUCOSE METER 187 mg/dL           H            : TESTED A

T [facility] [address]



             (BEAKER) (test code =                                        [facility],



             1538)                                               [address]:



                                                                 /Techni

matilda ID



                                                                 = [id number] for JOVITA TIMI



[id number] 20:27:00





             Test Item    Value        Reference Range Interpretation Comments

 

             ACTIVATED CLOTTING TIME 153 sec                                : 74

-137 seconds,



             (BEAKER) (test code =                                        Baseli

ne: TESTED AT



             [facility])                                                [address], [facility]

30:



                                                                 /Techni

matilda ID



                                                                 = [id number] for



                                                                 GERONIMO GIRON



[id number] 19:10:00





             Test Item    Value        Reference Range Interpretation Comments

 

             ACTIVATED CLOTTING TIME 175 sec                                : 74

-137 seconds,



             (BEAKER) (test code =                                        Baseli

ne: TESTED AT



             [facility])                                                [address], [facility]

30:



                                                                 /Techni

matilda ID



                                                                 = [id number] for NESTOR MARIA



POCT-GLUCOSE [id number] 17:38:00





             Test Item    Value        Reference Range Interpretation Comments

 

             POC-GLUCOSE METER 105 mg/dL                        : TESTED A

T [place] [address]



             (BEAKER) (test code =                                        [facility],



             1538)                                               [address]:



                                                                 /Techni

matilda ID



                                                                 = [id number] for BE

RNABE,



                                                                 CHRISTINE



[id number] 17:03:00





             Test Item    Value        Reference Range Interpretation Comments

 

             ACTIVATED CLOTTING TIME 224 sec                                : 74

-137 seconds,



             (BEAKER) (test code =                                        Baseli

ne: TESTED AT



             [facility])                                                [address], [facility]

30:



                                                                 /Techni

matilda ID



                                                                 = [id number] for DE

NNIS,



                                                                 ANGEL



[id number] 16:48:00





             Test Item    Value        Reference Range Interpretation Comments

 

             ACTIVATED CLOTTING TIME 263 sec                                : 74

-137 seconds,



             (BEAKER) (test code =                                        Baseli

ne: TESTED AT



             [facility])                                                [address], [facility]

30:



                                                                 /Techni

matilda ID



                                                                 = [id number] for At

chison,



                                                                 Rebecca



[id number] 16:06:00





             Test Item    Value        Reference Range Interpretation Comments

 

             ACTIVATED CLOTTING TIME 230 sec                                : 74

-137 seconds,



             (BEAKER) (test code =                                        Baseli

ne: TESTED AT



             [facility])                                                [address], [facility]

30:



                                                                 /Techni

matilda ID



                                                                 = [id number] for At

chison,



                                                                 Rebecca



[id number] 15:54:00





             Test Item    Value        Reference Range Interpretation Comments

 

             ACTIVATED CLOTTING TIME 219 sec                                : 74

-137 seconds,



             (BEAKER) (test code =                                        Baseli

ne: TESTED AT



             441)                                                [facility] [address]INA



                                                                 [facility], [address]

30:



                                                                 /Techni

matilda ID



                                                                 = [id number] for At

Rebecca geller



POCT-GLUCOSE [id number] 14:00:00





             Test Item    Value        Reference Range Interpretation Comments

 

             POC-GLUCOSE METER 127 mg/dL           H            : TESTED A

T [facility] [address]



             (CHARISSE) (test code =                                        CINTHYA ALDANA [facility],



             1538)                                               [address]:



                                                                 /Techni

matilda ID



                                                                 = [id number] for CHRISTINE LEMOS



SARS-COV2/RT-PCR ([facility])2020 11:00:00





             Test Item    Value        Reference Range Interpretation Comments

 

             SARS-COV2/RT-PCR (test Negative     Not Detected, Negative,        

      



             code = 2861466)              See external report for              



                                       linked test               

 

             SARS-COV-2 PERFORMING LAB [facility]                             



             (test code = 5230932)                                        



Negative result for this test determines that SARS-CoV-2 RNA was not present in 
the specimen above the Limit of Detection (LOD). However, Negative results do 
not preclude SARS-CoV-2 infection and should not be used as the sole basis for 
treatment or patient management decisions. Negative results must be combined 
with clinical observations, patient history, and epidemiological information. A 
false negative result may occur if a specimen is improperly collected, 
transported or handled. A false negative result should be considered if 
patient's recent exposures or clinical presentation indicate that COVID-19 
(SARS-CoV-2) is likely and diagnostic tests for other causes of illness are 
negative. Re-testing should be considered in cases of suspected false 
negatives.The limit of detection for this assay is 800 copies/mL.This SARS CoV-2
 test is a real-time RT-PCR test intended for the qualitative detection of 
nucleic acid from SARS-CoV-2 in a nasopharyngeal swab specimen collected from 
individuals suspected of COVID-19 by their healthcare provider.This test has not
 been Food and Drug Administration (FDA) cleared or approved. This is a modified
 version of an approved Emergency Use Authorization (EUA) and is in the process 
of review by the FDA. Once authorized by the FDA, the issued EUA will be 
effective until the declaration that circumstances exist justifying the 
authorization of the emergency use ofin vitro diagnostic tests for detection 
and/or diagnosis of COVID-19 is terminated under Section 564(b)(2) of the Act or
 the EUA is revoked under Section 564(g) of the Act.Fact Sheet for Healthcare 
Prov
iders:https://www.[employer]/sites/default/files/product/documents/Fact_Sheet_HC

_Hgcmampec_Nhml_CEPX-PnV-2.pdfFact Sheet for Healthcare 
Patients:https://www.[employer]/sites/default/files/product/docume
nts/Narw_Bbcnh_Zjemlowf_Jxwh_WXLM-FiQ-8.pdfPerforming Laboratory:[facility] [place], [place] [address], [place] [id number] 07:22:00





             Test Item    Value        Reference Range Interpretation Comments

 

             PARTIAL THROMBOPLASTIN TIME 49.7 seconds 22.5-36.0    H            



             (BEAKER) (test code = 760)                                        



6 hours after starting heparin infusion and as indicated per sliding scale
TROPONIN -30-37 03:08:00





             Test Item    Value        Reference Range Interpretation Comments

 

             TROPONIN I (BEAKER) (test code = 0.11 ng/mL   0.00-0.03    H       

     



             397)                                                



Troponin I (TnI) levels must be interpreted in the context of the presenting 
symptoms and the clinical findings. Elevated TnI levels indicate myocardial 
damage, but are not specific for ischemic heart disease. Elevated TnI levels are
 seen in patients with other cardiac conditions (including myocarditis and 
congestive heart failure), and slight TnI elevations occur in patients with 
other conditions, including sepsis, renal failure, acidosis, acute neurological 
disease, and persistent tachyarrhythmia. ID Izabella DUMONT LB-TYPE NATRIURETIC 
FACTOR (BNP)2020 03:03:00





             Test Item    Value        Reference Range Interpretation Comments

 

             B-TYPE NATRIURETIC PEPTIDE (BEAKER) 114 pg/mL    0-100        H    

        



             (test code = 700)                                        



 ID - JULIA [id number] 03:00:00





             Test Item    Value        Reference Range Interpretation Comments

 

             MAGNESIUM (BEAKER) (test code = 1.7 mg/dL    1.6-2.6               

    



             627)                                                



 ID - JULIA LBASIC METABOLIC [id number] 03:00:00





             Test Item    Value        Reference Range Interpretation Comments

 

             SODIUM (BEAKER) 137 meq/L    136-145                   



             (test code = 381)                                        

 

             POTASSIUM (BEAKER) 4.3 meq/L    3.5-5.1                   



             (test code = 379)                                        

 

             CHLORIDE (BEAKER) 109 meq/L           H            



             (test code = 382)                                        

 

             CO2 (BEAKER) (test 21 meq/L     22-29        L            



             code = 355)                                         

 

             BLOOD UREA NITROGEN 19 mg/dL     7-21                      



             (BEAKER) (test code                                        



             = 354)                                              

 

             CREATININE (BEAKER) 1.62 mg/dL   0.57-1.25    H            



             (test code = 358)                                        

 

             GLUCOSE RANDOM 131 mg/dL           H            



             (BEAKER) (test code                                        



             = 652)                                              

 

             CALCIUM (BEAKER) 9.0 mg/dL    8.4-10.2                  



             (test code = 697)                                        

 

             EGFR (BEAKER) (test 43 mL/min/1.73                           ESTIMA

TERRENCE GFR IS



             code = 1092) sq m                                   NOT AS ACCURATE

 AS



                                                                 CREATININE



                                                                 CLEARANCE IN



                                                                 PREDICTING



                                                                 GLOMERULAR



                                                                 FILTRATION RATE

.



                                                                 ESTIMATED GFR I

S



                                                                 NOT APPLICABLE 

FOR



                                                                 DIALYSIS PATIEN

TS.



 ID - PIAYA LCBC W/PLT COUNT &amp; AUTO [id number] 02:34:00





             Test Item    Value        Reference Range Interpretation Comments

 

             WHITE BLOOD CELL COUNT (BEAKER) 9.9 K/ L     3.5-10.5              

    



             (test code = 775)                                        

 

             RED BLOOD CELL COUNT (BEAKER) 4.17 M/ L    4.63-6.08    L          

  



             (test code = 761)                                        

 

             HEMOGLOBIN (BEAKER) (test code = 11.6 GM/DL   13.7-17.5    L       

     



             410)                                                

 

             HEMATOCRIT (BEAKER) (test code = 37.1 %       40.1-51.0    L       

     



             411)                                                

 

             MEAN CORPUSCULAR VOLUME (BEAKER) 89.0 fL      79.0-92.2            

     



             (test code = 753)                                        

 

             MEAN CORPUSCULAR HEMOGLOBIN 27.8 pg      25.7-32.2                 



             (BEAKER) (test code = 751)                                        

 

             MEAN CORPUSCULAR HEMOGLOBIN CONC 31.3 GM/DL   32.3-36.5    L       

     



             (BEAKER) (test code = 752)                                        

 

             RED CELL DISTRIBUTION WIDTH 13.2 %       11.6-14.4                 



             (BEAKER) (test code = 412)                                        

 

             PLATELET COUNT (BEAKER) (test 184 K/CU MM  150-450                 

  



             code = 756)                                         

 

             MEAN PLATELET VOLUME (BEAKER) 10.5 fL      9.4-12.4                

  



             (test code = 754)                                        

 

             NUCLEATED RED BLOOD CELLS 0 /100 WBC   0-0                       



             (BEAKER) (test code = 413)                                        

 

             NEUTROPHILS RELATIVE PERCENT 65 %                                  

 



             (BEAKER) (test code = 429)                                        

 

             LYMPHOCYTES RELATIVE PERCENT 21 %                                  

 



             (BEAKER) (test code = 430)                                        

 

             MONOCYTES RELATIVE PERCENT 9 %                                    



             (BEAKER) (test code = 431)                                        

 

             EOSINOPHILS RELATIVE PERCENT 4 %                                   

 



             (BEAKER) (test code = 432)                                        

 

             BASOPHILS RELATIVE PERCENT 0 %                                    



             (BEAKER) (test code = 437)                                        

 

             NEUTROPHILS ABSOLUTE COUNT 6.37 K/ L    1.78-5.38    H            



             (BEAKER) (test code = 670)                                        

 

             LYMPHOCYTES ABSOLUTE COUNT 2.10 K/ L    1.32-3.57                 



             (BEAKER) (test code = 414)                                        

 

             MONOCYTES ABSOLUTE COUNT (BEAKER) 0.87 K/ L    0.30-0.82    H      

      



             (test code = 415)                                        

 

             EOSINOPHILS ABSOLUTE COUNT 0.42 K/ L    0.04-0.54                 



             (BEAKER) (test code = 416)                                        

 

             BASOPHILS ABSOLUTE COUNT (BEAKER) 0.04 K/ L    0.01-0.08           

      



             (test code = 417)                                        

 

             IMMATURE GRANULOCYTES-RELATIVE 1 %          0-1                    

   



             PERCENT (BEAKER) (test code =                                      

  



             2801)                                               



RAD, CHEST, 1 VIEW, NON [id number] 22:17:00Reason for exam:-&gt;CHEST 
PAINShould this be performed at the bedside?-&gt;YesFINAL REPORT PATIENT ID: 
[id number] Chest, 1 view. History: Chest pain Comparison: Plain radiograph the 
chest dated 2020.. IMPRESSION: Postsurgical changes of median sternotomy 
with fracture of the second most superior sternotomy wire, unchanged. Coronary 
artery stents.The cardiomediastinal silhouette and pulmonary vasculature are 
within normal limits for a portable exam. The lungs are clear without evidence 
of consolidation or effusion. No pneumothorax. No acute osseous abnormality. 
[facility] Verified Date/Time: 2020 22:17:33 
Electronically signed by: MARGY COX MD on 2020 10:17 PMCBC 
W/PLT COUNT &amp; AUTO [id number] 22:12:00





             Test Item    Value        Reference Range Interpretation Comments

 

             WHITE BLOOD CELL COUNT (BEAKER) 9.7 K/ L     4.0-10.0              

    



             (test code = 775)                                        

 

             RED BLOOD CELL COUNT (BEAKER) 4.45 M/ L    4.20-5.80               

  



             (test code = 761)                                        

 

             HEMOGLOBIN (BEAKER) (test code = 12.8 GM/DL   13.0-16.8    L       

     



             410)                                                

 

             HEMATOCRIT (BEAKER) (test code = 38.1 %       40.0-50.0    L       

     



             411)                                                

 

             MEAN CORPUSCULAR VOLUME (BEAKER) 85.6 fL      82.0-98.0            

     



             (test code = 753)                                        

 

             MEAN CORPUSCULAR HEMOGLOBIN 28.8 pg      27.0-33.0                 



             (BEAKER) (test code = 751)                                        

 

             MEAN CORPUSCULAR HEMOGLOBIN CONC 33.6 GM/DL   32.0-36.0            

     



             (BEAKER) (test code = 752)                                        

 

             RED CELL DISTRIBUTION WIDTH 14.2 %       10.3-14.2                 



             (BEAKER) (test code = 412)                                        

 

             PLATELET COUNT (BEAKER) (test 182 K/CU MM  150-430                 

  



             code = 756)                                         

 

             MEAN PLATELET VOLUME (BEAKER) 8.2 fL       6.5-10.5                

  



             (test code = 754)                                        

 

             NEUTROPHILS RELATIVE PERCENT 68 %                                  

 



             (BEAKER) (test code = 429)                                        

 

             LYMPHOCYTES RELATIVE PERCENT 19 %                                  

 



             (BEAKER) (test code = 430)                                        

 

             MONOCYTES RELATIVE PERCENT 8 %                                    



             (BEAKER) (test code = 431)                                        

 

             EOSINOPHILS RELATIVE PERCENT 5 %                                   

 



             (BEAKER) (test code = 432)                                        

 

             BASOPHILS RELATIVE PERCENT 1 %                                    



             (BEAKER) (test code = 437)                                        

 

             NEUTROPHILS ABSOLUTE COUNT 6.64 K/ L    1.80-8.00                 



             (BEAKER) (test code = 670)                                        

 

             LYMPHOCYTES ABSOLUTE COUNT 1.81 K/ L    1.48-4.50                 



             (BEAKER) (test code = 414)                                        

 

             MONOCYTES ABSOLUTE COUNT (BEAKER) 0.73 K/ L    0.00-1.30           

      



             (test code = 415)                                        

 

             EOSINOPHILS ABSOLUTE COUNT 0.51 K/ L    0.00-0.50    H            



             (BEAKER) (test code = 416)                                        

 

             BASOPHILS ABSOLUTE COUNT (BEAKER) 0.05 K/ L    0.00-0.20           

      



             (test code = 417)                                        



B-TYPE NATRIURETIC FACTOR (BNP)2020 22:11:00





             Test Item    Value        Reference Range Interpretation Comments

 

             B-TYPE NATRIURETIC PEPTIDE (BEAKER) 78 pg/mL     0-100             

        



             (test code = 700)                                        



RAPID TROPONIN [id number] 22:09:00





             Test Item    Value        Reference Range Interpretation Comments

 

             RAPID TROPONIN I (BEAKER) (test code < ng/mL      <0.05            

         



             = 1483)                                             



[id number] 22:08:00





             Test Item    Value        Reference Range Interpretation Comments

 

             PROTIME (BEAKER) (test code = 759) 10.0 sec     9.8-12.0           

       

 

             INR (BEAKER) (test code = 370) 0.95 -       <=5.90                 

   

 

             PARTIAL THROMBOPLASTIN TIME 22.2 sec     25.8-34.5    L            



             (BEAKER) (test code = 760)                                        



RECOMMENDED COUMADIN/WARFARIN INR THERAPY RANGESSTANDARD DOSE: 2.0 - 3.0 
Includes: PROPHYLAXIS for venous thrombosis, systemic embolization; TREATMENT 
for venous thrombosis and/or pulmonary embolus.HIGH RISK: Target INR is 2.5-3.5 
for patients with mechanical heart valves.BASIC METABOLIC [id number] 
22:07:00





             Test Item    Value        Reference Range Interpretation Comments

 

             SODIUM (BEAKER) 139 meq/L    135-148                   



             (test code = 381)                                        

 

             POTASSIUM (BEAKER) 4.3 meq/L    3.6-5.5                   



             (test code = 379)                                        

 

             CHLORIDE (BEAKER) 105 meq/L                        



             (test code = 382)                                        

 

             CO2 (BEAKER) (test 26 meq/L     24-32                     



             code = 355)                                         

 

             BLOOD UREA NITROGEN 18 mg/dL     10-26                     



             (BEAKER) (test code                                        



             = 354)                                              

 

             CREATININE (BEAKER) 1.69 mg/dL   0.50-1.20    H            



             (test code = 358)                                        

 

             GLUCOSE RANDOM 215 mg/dL           H            



             (BEAKER) (test code                                        



             = 652)                                              

 

             CALCIUM (BEAKER) 8.8 mg/dL    8.5-10.5                  



             (test code = 697)                                        

 

             EGFR (BEAKER) (test 41 mL/min/1.73                           ESTIMA

TERRENCE GFR IS



             code = 1092) sq m                                   NOT AS ACCURATE

 AS



                                                                 CREATININE



                                                                 CLEARANCE IN



                                                                 PREDICTING



                                                                 GLOMERULAR



                                                                 FILTRATION RATE

.



                                                                 ESTIMATED GFR I

S



                                                                 NOT APPLICABLE 

FOR



                                                                 DIALYSIS PATIEN

TS.



HEPATIC FUNCTION [id number] 22:07:00





             Test Item    Value        Reference Range Interpretation Comments

 

             TOTAL PROTEIN (BEAKER) (test code = 7.0 gm/dL    6.0-8.5           

        



             770)                                                

 

             ALBUMIN (BEAKER) (test code = 1145) 4.2 g/dL     3.5-5.0           

        

 

             BILIRUBIN TOTAL (BEAKER) (test code 0.2 mg/dL    0.1-1.2           

        



             = 377)                                              

 

             BILIRUBIN DIRECT (BEAKER) (test 0.1 mg/dL    0.0-0.4               

    



             code = 706)                                         

 

             ALKALINE PHOSPHATASE (BEAKER) (test 87 U/L                   

        



             code = 346)                                         

 

             AST (SGOT) (BEAKER) (test code = 24 U/L       5-40                 

     



             353)                                                

 

             ALT (SGPT) (BEAKER) (test code = 28 U/L       5-50                 

     



             347)                                                



POCT-GLUCOSE [id number] 17:17:00





             Test Item    Value        Reference Range Interpretation Comments

 

             POC-GLUCOSE METER 225 mg/dL           H            : TESTED A

T [facility] [address]



             (BEAKER) (test code                                        [facility],



             = 1538)                                             [id number]:



                                                                 /Techni

matilda ID =



                                                                 [id number] for GLEN POSADAS



POCT-GLUCOSE [id number] 11:09:00





             Test Item    Value        Reference Range Interpretation Comments

 

             POC-GLUCOSE METER 352 mg/dL           H            : TESTED A

T [facility] [address]



             (BEAKER) (test code                                        [facility],



             = 1538)                                             [address]:



                                                                 /Techni

matilda ID =



                                                                 [id number] for GLEN POSADAS



BASIC METABOLIC [id number] 10:21:00





             Test Item    Value        Reference Range Interpretation Comments

 

             SODIUM (BEAKER) 132 meq/L    136-145      L            



             (test code = 381)                                        

 

             POTASSIUM (BEAKER) 4.4 meq/L    3.5-5.1                   



             (test code = 379)                                        

 

             CHLORIDE (BEAKER) 103 meq/L                        



             (test code = 382)                                        

 

             CO2 (BEAKER) (test 20 meq/L     22-29        L            



             code = 355)                                         

 

             BLOOD UREA NITROGEN 15 mg/dL     7-21                      



             (BEAKER) (test code                                        



             = 354)                                              

 

             CREATININE (BEAKER) 1.24 mg/dL   0.57-1.25                 



             (test code = 358)                                        

 

             GLUCOSE RANDOM 322 mg/dL           H            



             (BEAKER) (test code                                        



             = 652)                                              

 

             CALCIUM (BEAKER) 8.7 mg/dL    8.4-10.2                  



             (test code = 697)                                        

 

             EGFR (BEAKER) (test 58 mL/min/1.73                           ESTIMA

TERRENCE GFR IS



             code = 1092) sq m                                   NOT AS ACCURATE

 AS



                                                                 CREATININE



                                                                 CLEARANCE IN



                                                                 PREDICTING



                                                                 GLOMERULAR



                                                                 FILTRATION RATE

.



                                                                 ESTIMATED GFR I

S



                                                                 NOT APPLICABLE 

FOR



                                                                 DIALYSIS PATIEN

TS.



 ID - BSCBC (HEMOGRAM ONLY)2020 05:04:00





             Test Item    Value        Reference Range Interpretation Comments

 

             WHITE BLOOD CELL COUNT (BEAKER) 9.6 K/ L     3.5-10.5              

    



             (test code = 775)                                        

 

             RED BLOOD CELL COUNT (BEAKER) 3.65 M/ L    4.63-6.08    L          

  



             (test code = 761)                                        

 

             HEMOGLOBIN (BEAKER) (test code = 10.4 GM/DL   13.7-17.5    L       

     



             410)                                                

 

             HEMATOCRIT (BEAKER) (test code = 32.0 %       40.1-51.0    L       

     



             411)                                                

 

             MEAN CORPUSCULAR VOLUME (BEAKER) 87.7 fL      79.0-92.2            

     



             (test code = 753)                                        

 

             MEAN CORPUSCULAR HEMOGLOBIN 28.5 pg      25.7-32.2                 



             (BEAKER) (test code = 751)                                        

 

             MEAN CORPUSCULAR HEMOGLOBIN CONC 32.5 GM/DL   32.3-36.5            

     



             (BEAKER) (test code = 752)                                        

 

             RED CELL DISTRIBUTION WIDTH 13.0 %       11.6-14.4                 



             (BEAKER) (test code = 412)                                        

 

             PLATELET COUNT (BEAKER) (test 168 K/CU MM  150-450                 

  



             code = 756)                                         

 

             MEAN PLATELET VOLUME (BEAKER) 10.4 fL      9.4-12.4                

  



             (test code = 754)                                        

 

             NUCLEATED RED BLOOD CELLS 0 /100 WBC   0-0                       



             (BEAKER) (test code = 413)                                        



POCT-GLUCOSE [id number] 22:08:00





             Test Item    Value        Reference Range Interpretation Comments

 

             POC-GLUCOSE METER 295 mg/dL           H            : TESTED A

T [facility] [address]



             (BEAKER) (test code =                                        [place],



             1538)                                               [address]:



                                                                 /Techni

matilda ID



                                                                 = [id number] for ZAIRE NORMAN



[id number] 17:12:00





             Test Item    Value        Reference Range Interpretation Comments

 

             MAGNESIUM (BEAKER) (test code = 1.4 mg/dL    1.6-2.6      L        

    



             627)                                                



 ID - [id number] 15:51:00





             Test Item    Value        Reference Range Interpretation Comments

 

             ACTIVATED CLOTTING TIME 263 sec                                Refe

rence Range: 



             (BEAKER) (test code =                                        second

s,



             441)                                                Baseline/TESTED

 AT



                                                                 [facility] [address] [facility] [address]

0



[id number] 15:39:00





             Test Item    Value        Reference Range Interpretation Comments

 

             ACTIVATED CLOTTING TIME 246 sec                                Refe

rence Range: 



             (BEAKER) (test code =                                        second

s,



             441)                                                Baseline/TESTED

 AT



                                                                 [facility] [address] [facility] [place] [address]

0



[id number] 15:16:00





             Test Item    Value        Reference Range Interpretation Comments

 

             ACTIVATED CLOTTING TIME 213 sec                                Refe

rence Range: 



             (BEAKER) (test code =                                        second

s,



             441)                                                Baseline/TESTED

 AT



                                                                 [facility] [address] [facility] [place] [address]

0



PLATELET AGGREGATION: DRUG [id number] 14:48:00





             Test Item    Value        Reference Range Interpretation Comments

 

             ARACHADONIC ACID 13 %         63-89        L            



             RESULT(BEAKER) (test                                        



             code = 2138)                                        

 

             PLATELET AGG DRUG Decreased response to                           



             INTERPRETATION (BEAKER) arachidonic acid                           



             (test code = 2408) suggests aspirin-like                           



                          effect.                                

 

             PLATELET AGG DRUG Decreased aggregation                           



             INTERPRETATION (BEAKER) with ADP which                           



             (test code = 306518) indicates platelet                           



                          dysfunction that may                           



                          be due to medication                           



                          effect, uremia, or                           



                          other platelet                           



                          function disorders.                           



                          Clinical correlation                           



                          is required.                           

 

             [id number] Meredith Reyes, MD                           



             (BEAKER) (test code = (electronic                            



             2621)        signature)                             

 

             PLATELET COUNT  K/CU MM  150-450                   



             (BEAKER) (test code =                                        



             2656)                                               

 

             PLATELET RICH 276 k/cu mm  200-300                   



             PLASMA(BEAKER) (test                                        



             code = 2134)                                        



Platelet function studies by aggregation methodology on samples with platelet 
count &lt;75,000/CU MMare unreliable; platelet function assessment should not be
 based on a single test. ID - [id number] 14:47:00





             Test Item    Value        Reference Range Interpretation Comments

 

             HEMOGLOBIN A1C (BEAKER) (test code = 9.1 %        4.3-6.1      H   

         



             368)                                                



LIPID [id number] 14:44:00





             Test Item    Value        Reference Range Interpretation Comments

 

             TRIGLYCERIDES (BEAKER) (test code = 152 mg/dL                      

        



             540)                                                

 

             CHOLESTEROL (BEAKER) (test code = 115 mg/dL                        

      



             631)                                                

 

             HDL CHOLESTEROL (BEAKER) (test code 26 mg/dL                       

        



             = 976)                                              

 

             LDL CHOLESTEROL CALCULATED (BEAKER) 59 mg/dL                       

        



             (test code = 633)                                        



Triglyceride Reference Range: Low Risk &lt;150 Borderline 150-199 High Risk 200-
499 Very High Risk &gt;=500Cholesterol Reference Range: Low Risk &lt;200 
Borderline 200-239 High Risk &gt;240HDL Cholesterol Reference Range: Low Risk 
&gt;=60 High Risk  &lt;40LDL Cholesterol Reference Range: Optimal &lt;100 Near 
Optimal 100-129 Borderline 130-159 High 160-189 Very High &gt;=190  ID -
 BSTROPONIN -09-87 10:57:00





             Test Item    Value        Reference Range Interpretation Comments

 

             TROPONIN I (BEAKER) (test code = 0.07 ng/mL   0.00-0.03    H       

     



             397)                                                








             Test Item    Value        Reference Range Interpretation Comments

 

             B-TYPE NATRIURETIC PEPTIDE (BEAKER) 73 pg/mL     0-100             

        



             (test code = 700)                                        



 ID - MAGDALENA CCOMPREHENSIVE METABOLIC [id number] 10:50:00





             Test Item    Value        Reference Range Interpretation Comments

 

             TOTAL PROTEIN 6.4 gm/dL    6.0-8.3                   



             (BEAKER) (test code =                                        



             770)                                                

 

             ALBUMIN (BEAKER) 4.0 g/dL     3.5-5.0                   



             (test code = 1145)                                        

 

             ALKALINE PHOSPHATASE 80 U/L                           



             (BEAKER) (test code =                                        



             346)                                                

 

             BILIRUBIN TOTAL 0.3 mg/dL    0.2-1.2                   



             (BEAKER) (test code =                                        



             377)                                                

 

             SODIUM (BEAKER) (test 139 meq/L    136-145                   



             code = 381)                                         

 

             POTASSIUM (BEAKER) 4.2 meq/L    3.5-5.1                   



             (test code = 379)                                        

 

             CHLORIDE (BEAKER) 108 meq/L           H            



             (test code = 382)                                        

 

             CO2 (BEAKER) (test 24 meq/L     22-29                     



             code = 355)                                         

 

             BLOOD UREA NITROGEN 19 mg/dL     7-21                      



             (BEAKER) (test code =                                        



             354)                                                

 

             CREATININE (BEAKER) 1.23 mg/dL   0.57-1.25                 



             (test code = 358)                                        

 

             GLUCOSE RANDOM 158 mg/dL           H            



             (BEAKER) (test code =                                        



             652)                                                

 

             CALCIUM (BEAKER) 8.9 mg/dL    8.4-10.2                  



             (test code = 697)                                        

 

             AST (SGOT) (BEAKER) 17 U/L       5-34                      



             (test code = 353)                                        

 

             ALT (SGPT) (BEAKER) 20 U/L       6-55                      



             (test code = 347)                                        

 

             EGFR (BEAKER) (test 59 mL/min/1.73                           ESTIMA

TERRENCE GFR IS



             code = 1092) sq m                                   NOT AS ACCURATE

 AS



                                                                 CREATININE



                                                                 CLEARANCE IN



                                                                 PREDICTING



                                                                 GLOMERULAR



                                                                 FILTRATION RATE

.



                                                                 ESTIMATED GFR I

S



                                                                 NOT APPLICABLE 

FOR



                                                                 DIALYSIS PATIEN

TS.



 ID - MAGDALENA CPT/[id number] 10:46:00





             Test Item    Value        Reference Range Interpretation Comments

 

             PROTIME (BEAKER) (test code = 13.4 seconds 11.9-14.2               

  



             759)                                                

 

             INR (BEAKER) (test code = 370) 1.1          <=5.9                  

   

 

             PARTIAL THROMBOPLASTIN TIME 36.1 seconds 22.5-36.0    H            



             (BEAKER) (test code = 760)                                        



Effective 2019: PT Reference Range ChangeNew: 11.9-14.2 Previous: 11.7-
14.7RECOMMENDED COUMADIN/WARFARIN INR THERAPY RANGESSTANDARD DOSE: 2.0-3.0 
Includes: PROPHYLAXIS for venous thrombosis, systemic embolization; TREATMENT 
for venous thrombosis and/or pulmonary embolus.HIGH RISK: Target INR is 2.5-3.5 
for patients wiht mechanical heart valves.PROTHROMBIN TIME/[id number] 
10:45:00





             Test Item    Value        Reference Range Interpretation Comments

 

             PROTIME (BEAKER) (test code = 13.4 seconds 11.9-14.2               

  



             759)                                                

 

             INR (BEAKER) (test code = 370) 1.1          <=5.9                  

   



Effective 2019: PT Reference Range ChangeNew: 11.9-14.2 Previous: 11.7-
14.7RECOMMENDED COUMADIN/WARFARIN INR THERAPY RANGESSTANDARD DOSE: 2.0-3.0 
Includes: PROPHYLAXIS for venous thrombosis, systemic embolization; TREATMENT 
for venous thrombosis and/or pulmonary embolus.HIGH RISK: Target INR is 2.5-3.5 
for patients wiht mechanical heart valves.CBC W/PLT COUNT &amp; AUTO 
[id number] 10:31:00





             Test Item    Value        Reference Range Interpretation Comments

 

             WHITE BLOOD CELL COUNT (BEAKER) 8.9 K/ L     3.5-10.5              

    



             (test code = 775)                                        

 

             RED BLOOD CELL COUNT (BEAKER) 3.95 M/ L    4.63-6.08    L          

  



             (test code = 761)                                        

 

             HEMOGLOBIN (BEAKER) (test code = 11.3 GM/DL   13.7-17.5    L       

     



             410)                                                

 

             HEMATOCRIT (BEAKER) (test code = 34.4 %       40.1-51.0    L       

     



             411)                                                

 

             MEAN CORPUSCULAR VOLUME (BEAKER) 87.1 fL      79.0-92.2            

     



             (test code = 753)                                        

 

             MEAN CORPUSCULAR HEMOGLOBIN 28.6 pg      25.7-32.2                 



             (BEAKER) (test code = 751)                                        

 

             MEAN CORPUSCULAR HEMOGLOBIN CONC 32.8 GM/DL   32.3-36.5            

     



             (BEAKER) (test code = 752)                                        

 

             RED CELL DISTRIBUTION WIDTH 13.2 %       11.6-14.4                 



             (BEAKER) (test code = 412)                                        

 

             PLATELET COUNT (BEAKER) (test 183 K/CU MM  150-450                 

  



             code = 756)                                         

 

             MEAN PLATELET VOLUME (BEAKER) 10.0 fL      9.4-12.4                

  



             (test code = 754)                                        

 

             NUCLEATED RED BLOOD CELLS 0 /100 WBC   0-0                       



             (BEAKER) (test code = 413)                                        

 

             NEUTROPHILS RELATIVE PERCENT 67 %                                  

 



             (BEAKER) (test code = 429)                                        

 

             LYMPHOCYTES RELATIVE PERCENT 19 %                                  

 



             (BEAKER) (test code = 430)                                        

 

             MONOCYTES RELATIVE PERCENT 9 %                                    



             (BEAKER) (test code = 431)                                        

 

             EOSINOPHILS RELATIVE PERCENT 5 %                                   

 



             (BEAKER) (test code = 432)                                        

 

             BASOPHILS RELATIVE PERCENT 1 %                                    



             (BEAKER) (test code = 437)                                        

 

             NEUTROPHILS ABSOLUTE COUNT 5.97 K/ L    1.78-5.38    H            



             (BEAKER) (test code = 670)                                        

 

             LYMPHOCYTES ABSOLUTE COUNT 1.69 K/ L    1.32-3.57                 



             (BEAKER) (test code = 414)                                        

 

             MONOCYTES ABSOLUTE COUNT (BEAKER) 0.76 K/ L    0.30-0.82           

      



             (test code = 415)                                        

 

             EOSINOPHILS ABSOLUTE COUNT 0.40 K/ L    0.04-0.54                 



             (BEAKER) (test code = 416)                                        

 

             BASOPHILS ABSOLUTE COUNT (BEAKER) 0.05 K/ L    0.01-0.08           

      



             (test code = 417)                                        

 

             IMMATURE GRANULOCYTES-RELATIVE 1 %          0-1                    

   



             PERCENT (BEAKER) (test code =                                      

  



             2801)                                               



RAPID TROPONIN -91-72 05:50:00





             Test Item    Value        Reference Range Interpretation Comments

 

             RAPID TROPONIN I (BEAKER) (test code < ng/mL      <0.05            

         



             = 1483)                                             



RAD, CHEST, 1 VIEW, NON [id number] 22:32:00Reason for exam:-&gt;CHEST 
PAINShould this be performed at the bedside?-&gt;NoFINAL REPORT PATIENT ID: 
[id number] Chest, 1 view. History: Chest pain Comparison: Plain radiograph the 
chest dated 2018.. Findings: Postsurgical changes of a median sternotomy and
 CABG with fractureof the second most superior sternotomy wire. The 
Cardiomediastinal silhouette and pulmonary vasculature are within normal limits 
for a portable exam. The lungs are clear without evidence of consolidation or 
effusion. The soft tissues and osseous structures are intact. IMPRESSION: No 
acute cardiopulmonary abnormality. Signed: Margy Cox [facility] Verified
 Date/Time: 2020 22:32:48 Electronically signed by: MARGY OCX MD on 2020 10:32 PMRAPID TROPONIN -[id number] 22:18:00





             Test Item    Value        Reference Range Interpretation Comments

 

             RAPID TROPONIN I (BEAKER) (test code < ng/mL      <0.05            

         



             = 1483)                                             



COMPREHENSIVE METABOLIC [id number] 22:17:00





             Test Item    Value        Reference Range Interpretation Comments

 

             TOTAL PROTEIN 6.5 gm/dL    6.0-8.5                   



             (BEAKER) (test code =                                        



             770)                                                

 

             ALBUMIN (BEAKER) 4.0 g/dL     3.5-5.0                   



             (test code = 1145)                                        

 

             ALKALINE PHOSPHATASE 115 U/L                          



             (BEAKER) (test code =                                        



             346)                                                

 

             BILIRUBIN TOTAL 0.3 mg/dL    0.1-1.2                   



             (BEAKER) (test code =                                        



             377)                                                

 

             SODIUM (BEAKER) (test 137 meq/L    135-148                   



             code = 381)                                         

 

             POTASSIUM (BEAKER) 4.4 meq/L    3.6-5.5                   



             (test code = 379)                                        

 

             CHLORIDE (BEAKER) 104 meq/L                        



             (test code = 382)                                        

 

             CO2 (BEAKER) (test 24 meq/L     24-32                     



             code = 355)                                         

 

             BLOOD UREA NITROGEN 21 mg/dL     10-26                     



             (BEAKER) (test code =                                        



             354)                                                

 

             CREATININE (BEAKER) 1.29 mg/dL   0.50-1.20    H            



             (test code = 358)                                        

 

             GLUCOSE RANDOM 365 mg/dL           H            



             (BEAKER) (test code =                                        



             652)                                                

 

             CALCIUM (BEAKER) 8.8 mg/dL    8.5-10.5                  



             (test code = 697)                                        

 

             AST (SGOT) (BEAKER) 23 U/L       5-40                      



             (test code = 353)                                        

 

             ALT (SGPT) (BEAKER) 32 U/L       5-50                      



             (test code = 347)                                        

 

             EGFR (BEAKER) (test 56 mL/min/1.73                           ESTIMA

TERRENCE GFR IS



             code = 1092) sq m                                   NOT AS ACCURATE

 AS



                                                                 CREATININE



                                                                 CLEARANCE IN



                                                                 PREDICTING



                                                                 GLOMERULAR



                                                                 FILTRATION RATE

.



                                                                 ESTIMATED GFR I

S



                                                                 NOT APPLICABLE 

FOR



                                                                 DIALYSIS PATIEN

TS.



CBC W/PLT COUNT &amp; AUTO [id number] 21:59:00





             Test Item    Value        Reference Range Interpretation Comments

 

             WHITE BLOOD CELL COUNT (BEAKER) 8.6 K/ L     4.0-10.0              

    



             (test code = 775)                                        

 

             RED BLOOD CELL COUNT (BEAKER) 4.09 M/ L    4.20-5.80    L          

  



             (test code = 761)                                        

 

             HEMOGLOBIN (BEAKER) (test code = 11.8 GM/DL   13.0-16.8    L       

     



             410)                                                

 

             HEMATOCRIT (BEAKER) (test code = 35.3 %       40.0-50.0    L       

     



             411)                                                

 

             MEAN CORPUSCULAR VOLUME (BEAKER) 86.2 fL      82.0-98.0            

     



             (test code = 753)                                        

 

             MEAN CORPUSCULAR HEMOGLOBIN 28.9 pg      27.0-33.0                 



             (BEAKER) (test code = 751)                                        

 

             MEAN CORPUSCULAR HEMOGLOBIN CONC 33.5 GM/DL   32.0-36.0            

     



             (BEAKER) (test code = 752)                                        

 

             RED CELL DISTRIBUTION WIDTH 14.3 %       10.3-14.2    H            



             (BEAKER) (test code = 412)                                        

 

             PLATELET COUNT (BEAKER) (test 201 K/CU MM  150-430                 

  



             code = 756)                                         

 

             MEAN PLATELET VOLUME (BEAKER) 8.3 fL       6.5-10.5                

  



             (test code = 754)                                        

 

             NEUTROPHILS RELATIVE PERCENT 67 %                                  

 



             (BEAKER) (test code = 429)                                        

 

             LYMPHOCYTES RELATIVE PERCENT 20 %                                  

 



             (BEAKER) (test code = 430)                                        

 

             MONOCYTES RELATIVE PERCENT 8 %                                    



             (BEAKER) (test code = 431)                                        

 

             EOSINOPHILS RELATIVE PERCENT 5 %                                   

 



             (BEAKER) (test code = 432)                                        

 

             BASOPHILS RELATIVE PERCENT 1 %                                    



             (BEAKER) (test code = 437)                                        

 

             NEUTROPHILS ABSOLUTE COUNT 5.74 K/ L    1.80-8.00                 



             (BEAKER) (test code = 670)                                        

 

             LYMPHOCYTES ABSOLUTE COUNT 1.69 K/ L    1.48-4.50                 



             (BEAKER) (test code = 414)                                        

 

             MONOCYTES ABSOLUTE COUNT (BEAKER) 0.68 K/ L    0.00-1.30           

      



             (test code = 415)                                        

 

             EOSINOPHILS ABSOLUTE COUNT 0.42 K/ L    0.00-0.50                 



             (BEAKER) (test code = 416)                                        

 

             BASOPHILS ABSOLUTE COUNT (BEAKER) 0.05 K/ L    0.00-0.20           

      



             (test code = 417)                                        



Microalbumin/Creatinine [Mass Ratio] in Urine2019-11-15 16:34:00





             Test Item    Value        Reference Range Interpretation Comments

 

             microalbumin random urine 32 ug/mL                               



             (test code = microalbumin                                        



             random urine)                                        

 

             creatinine random urine (test 177.9 mg/dL  20.0-370.0              

  



             code = creatinine random                                        



             urine)                                              

 

             microalbumin/creatinine 18 mcg/mg creat                           



             (random urine) ratio                                        



             calculated (test code =                                        



             microalbumin/creatinine                                        



             (random urine) ratio                                        



             calculated)                                         



[facility] W Auto Differential panel - [id number] 02:11:00





             Test Item    Value        Reference Range Interpretation Comments

 

             white blood cell count (test 8.0 thousand/uL 3.8-10.8              

    



             code = white blood cell                                        



             count)                                              

 

             red blood cell count (test 4.45 million/uL 4.20-5.80               

  



             code = red blood cell count)                                       

 

 

             hemoglobin (test code = 12.4 g/dL    13.2-17.1    L            



             hemoglobin)                                         

 

             hematocrit (test code = 39.7 %       38.5-50.0                 



             hematocrit)                                         

 

             MCV (test code = MCV) 89.2 fL      80.0-100.0                

 

             MCH (test code = MCH) 27.9 pg      27.0-33.0                 

 

             MCHC (test code = MCHC) 31.2 g/dL    32.0-36.0    L            

 

             RDW (test code = RDW) 11.9 %       11.0-15.0                 

 

             platelet count (test code = 265 thousand/uL 140-400                

   



             platelet count)                                        

 

             MPV (test code = MPV) 11.0 fL      7.5-12.5                  

 

             absolute neutrophils (test 4696 cells/uL 4563-2092                 



             code = absolute neutrophils)                                       

 

 

             absolute lymphocytes (test 1752 cells/uL 850-3900                  



             code = absolute lymphocytes)                                       

 

 

             absolute monocytes (test code 880 cells/uL 200-950                 

  



             = absolute monocytes)                                        

 

             absolute eosinophils (test 592 cells/uL        H            



             code = absolute eosinophils)                                       

 

 

             absolute basophils (test code 80 cells/uL  0-200                   

  



             = absolute basophils)                                        

 

             neutrophils (test code = 58.7 %                                 



             neutrophils)                                        

 

             lymphocytes (test code = 21.9 %                                 



             lymphocytes)                                        

 

             monocytes (test code = 11.0 %                                 



             monocytes)                                          

 

             eosinophils (test code = 7.4 %                                  



             eosinophils)                                        

 

             basophils (test code = 1.0 %                                  



             basophils)                                          



[facility]
2019 14:42:00





             Test Item    Value        Reference Range Interpretation Comments

 

             ALT (test code = ALT) 22 U/L       0-55                      

 

             AST (test code = AST) 18 U/L       5-34                      

 

             BUN (test code = BUN) 20.3 mg/dL   8.4-25.0                  

 

             alk phos (test code = alk 116 unit/L                       



             phos)                                               

 

             glucose (test code = 424 mg/dL    70-99        H            



             glucose)                                            

 

             albumin (test code = 3.8 g/dL     3.4-5.1                   



             albumin)                                            

 

             creatinine (test code = 1.75 mg/dL   0.72-1.25    H            



             creatinine)                                         

 

             eGFR non-african american 39 mL/min/1.73m2              A          

  



             (test code = eGFR                                        



             non-)                                        

 

             total bilirubin (test code = 0.3 mg/dL    0.2-1.2                  

 



             total bilirubin)                                        

 

             eGFR - african american 47 mL/min/1.73m2              A            



             (test code = eGFR -                                         



             american)                                           

 

             sodium (test code = sodium) 134 mEq/L    135-145      L            

 

             potassium (test code = 5.4 mEq/L    3.5-5.1      H            



             potassium)                                          

 

             chloride (test code = 101 mmol/L                       



             chloride)                                           

 

             total protein (test code = 6.6 g/dL     6.1-8.2                   



             total protein)                                        

 

             calcium (test code = 9.5 mg/dL    9.0-10.2                  



             calcium)                                            

 

             CO2 (test code = CO2) 23.4 mmol/L  20.0-32.0                 

 

             anion gap (test code = anion 10 calc                               

 



             gap)                                                



[facility] Serum or [id number] 13:28:00





             Test Item    Value        Reference Range Interpretation Comments

 

             HDL (test code = HDL) 25 mg/dL                  L            

 

             triglyceride (test code = 455 mg/dL    0-150        H            



             triglyceride)                                        

 

             VLDL (calculated) (test code = 91 mg/dL                            

   



             VLDL (calculated))                                        

 

             cholesterol/HDL ratio (test code 5.3 mg/dL                         

     



             = cholesterol/HDL ratio)                                        

 

             non-HDL cholesterol (calculated) 108 mg/dL    0-160                

     



             (test code = non-HDL cholesterol                                   

     



             (calculated))                                        

 

             cholesterol (test code = 133 mg/dL    0-200                     



             cholesterol)                                        

 

             Cholesterol in LDL [Mass/volume] see comment  0-130        L       

     



             in Serum or Plasma (test code =                                    

    



             2089-1)                                             



[facility] [Units/volume] in Serum or [id number] 
13:28:00





             Test Item    Value        Reference Range Interpretation Comments

 

             TSH (test code = TSH) 3.019 uIU/mL 0.350-4.940               



[facility] or [id number] 13:28:00





             Test Item    Value        Reference Range Interpretation Comments

 

             PSA, total (test code = PSA, 0.29 NG/mL   0.00-4.00                

 



             total)                                              



[facility]emoglobin A1c/Hemoglobin.total in [id number] 
12:43:00





             Test Item    Value        Reference Range Interpretation Comments

 

             Hemoglobin A1c/Hemoglobin.total in 8.6 %        1.0-5.7      H     

       



             Blood (test code = 4548-4)                                        

 

             average blood glucose (test code = 200 mg/dL                       

       



             average blood glucose)                                        



[facility][U] XRAY KNEE 3 VWS RIGHT [id number] 08:24:00Images 
acquired, not reported on this accession number.UT PhysiciansPLATELET 
AGGREGATION: FUNCTION [id number] 16:37:00





             Test Item    Value        Reference Range Interpretation Comments

 

             WEAK ADP     54 %         60-91        L            



             RESULT(BEAKER) (test                                        



             code = 2135)                                        

 

             PLATELET FUNCTION 50-59% indicates mild                           



             SCREEN INTERP platelet dysfunction                           



             (BEAKER) (test code =                                        



             2173)                                               

 

             [id number] Shay Neal MD                           



             (BEAKER) (test code = (electronic signature)                       

    



             9454)                                               

 

             PLATELET COUNT  K/CU MM  150-450                   



             (BEAKER) (test code =                                        



             3617)                                               



[id number] 05:57:00





             Test Item    Value        Reference Range Interpretation Comments

 

             MAGNESIUM (BEAKER) (test code = 1.6 mg/dL    1.6-2.6               

    



             627)                                                



BASIC METABOLIC [id number] 05:57:00





             Test Item    Value        Reference Range Interpretation Comments

 

             SODIUM (BEAKER) 137 meq/L    136-145                   



             (test code = 381)                                        

 

             POTASSIUM (BEAKER) 4.3 meq/L    3.5-5.1                   



             (test code = 379)                                        

 

             CHLORIDE (BEAKER) 107 meq/L                        



             (test code = 382)                                        

 

             CO2 (BEAKER) (test 21 meq/L     22-29        L            



             code = 355)                                         

 

             BLOOD UREA NITROGEN 13 mg/dL     7-21                      



             (BEAKER) (test code                                        



             = 354)                                              

 

             CREATININE (BEAKER) 0.92 mg/dL   0.57-1.25                 



             (test code = 358)                                        

 

             GLUCOSE RANDOM 183 mg/dL           H            



             (BEAKER) (test code                                        



             = 652)                                              

 

             CALCIUM (BEAKER) 9.3 mg/dL    8.4-10.2                  



             (test code = 697)                                        

 

             EGFR (BEAKER) (test 83 mL/min/1.73                           ESTIMA

TERRENCE GFR IS



             code = 1092) sq m                                   NOT AS ACCURATE

 AS



                                                                 CREATININE



                                                                 CLEARANCE IN



                                                                 PREDICTING



                                                                 GLOMERULAR



                                                                 FILTRATION RATE

.



                                                                 ESTIMATED GFR I

S



                                                                 NOT APPLICABLE 

FOR



                                                                 DIALYSIS PATIEN

TS.



CBC W/PLT COUNT &amp; AUTO [id number] 05:43:00





             Test Item    Value        Reference Range Interpretation Comments

 

             WHITE BLOOD CELL COUNT (BEAKER) 9.9 K/ L     3.5-10.5              

    



             (test code = 775)                                        

 

             RED BLOOD CELL COUNT (BEAKER) 4.18 M/ L    4.63-6.08    L          

  



             (test code = 761)                                        

 

             HEMOGLOBIN (BEAKER) (test code = 11.5 GM/DL   13.7-17.5    L       

     



             410)                                                

 

             HEMATOCRIT (BEAKER) (test code = 34.9 %       40.1-51.0    L       

     



             411)                                                

 

             MEAN CORPUSCULAR VOLUME (BEAKER) 83.5 fL      79.0-92.2            

     



             (test code = 753)                                        

 

             MEAN CORPUSCULAR HEMOGLOBIN 27.5 pg      25.7-32.2                 



             (BEAKER) (test code = 751)                                        

 

             MEAN CORPUSCULAR HEMOGLOBIN CONC 33.0 GM/DL   32.3-36.5            

     



             (BEAKER) (test code = 752)                                        

 

             RED CELL DISTRIBUTION WIDTH 12.6 %       11.6-14.4                 



             (BEAKER) (test code = 412)                                        

 

             PLATELET COUNT (BEAKER) (test 169 K/CU MM  150-450                 

  



             code = 756)                                         

 

             MEAN PLATELET VOLUME (BEAKER) 10.7 fL      9.4-12.4                

  



             (test code = 754)                                        

 

             NUCLEATED RED BLOOD CELLS 0 /100 WBC   0-0                       



             (BEAKER) (test code = 413)                                        

 

             NEUTROPHILS RELATIVE PERCENT 74 %                                  

 



             (BEAKER) (test code = 429)                                        

 

             LYMPHOCYTES RELATIVE PERCENT 14 %                                  

 



             (BEAKER) (test code = 430)                                        

 

             MONOCYTES RELATIVE PERCENT 8 %                                    



             (BEAKER) (test code = 431)                                        

 

             EOSINOPHILS RELATIVE PERCENT 4 %                                   

 



             (BEAKER) (test code = 432)                                        

 

             BASOPHILS RELATIVE PERCENT 1 %                                    



             (BEAKER) (test code = 437)                                        

 

             NEUTROPHILS ABSOLUTE COUNT 7.25 K/ L    1.78-5.38    H            



             (BEAKER) (test code = 670)                                        

 

             LYMPHOCYTES ABSOLUTE COUNT 1.35 K/ L    1.32-3.57                 



             (BEAKER) (test code = 414)                                        

 

             MONOCYTES ABSOLUTE COUNT (BEAKER) 0.74 K/ L    0.30-0.82           

      



             (test code = 415)                                        

 

             EOSINOPHILS ABSOLUTE COUNT 0.39 K/ L    0.04-0.54                 



             (BEAKER) (test code = 416)                                        

 

             BASOPHILS ABSOLUTE COUNT (BEAKER) 0.07 K/ L    0.01-0.08           

      



             (test code = 417)                                        

 

             IMMATURE GRANULOCYTES-RELATIVE 1 %          0-1                    

   



             PERCENT (BEAKER) (test code =                                      

  



             2801)                                               



POCT-GLUCOSE [id number] 23:00:00





             Test Item    Value        Reference Range Interpretation Comments

 

             POC-GLUCOSE METER 159 mg/dL           H            TESTED AT 

[facility]



             [facility] ([facility]                                        CINTHYA HIGGINBOTHAM



             1538)                                               [address]



POCT-GLUCOSE [id number] 14:18:00





             Test Item    Value        Reference Range Interpretation Comments

 

             POC-GLUCOSE METER 178 mg/dL           H            TESTED AT 

[facility]



             ([facility]) (test code =                                        CINTHYA ALDANA [facility]



             1538)                                               [address]



[id number] 12:13:00





             Test Item    Value        Reference Range Interpretation Comments

 

             ACTIVATED CLOTTING TIME 235 sec                                TEST

ED AT [place] [address]



             ([facility]) (test code =                                        CINTHYA ALDANA [facility]



             441)                                                [address]



[id number] 12:02:00





             Test Item    Value        Reference Range Interpretation Comments

 

             ACTIVATED CLOTTING TIME 186 sec                                TEST

ED AT [place] [address]



             ([facility]) (test code =                                        CINTHYA ALDANA [facility]



             441)                                                [address]



TROPONIN -22-15 09:02:00





             Test Item    Value        Reference Range Interpretation Comments

 

             TROPONIN I ([facility]) (test code = 0.01 ng/mL   0.00-0.03            

     



             397)                                                








             Test Item    Value        Reference Range Interpretation Comments

 

             POC-GLUCOSE METER 186 mg/dL           H            TESTED AT 

[place] [address]



             ([facility]) (test code =                                        CINTHYA ALDANA [facility]



             1538)                                               [address]



TROPONIN -55-17 05:06:00





             Test Item    Value        Reference Range Interpretation Comments

 

             TROPONIN I ([facility]) (test code = 0.01 ng/mL   0.00-0.03            

     



             397)                                                








             Test Item    Value        Reference Range Interpretation Comments

 

             MAGNESIUM ([facility]) (test code = 1.7 mg/dL    1.6-2.6               

    



             627)                                                



BASIC METABOLIC [id number] 05:03:00





             Test Item    Value        Reference Range Interpretation Comments

 

             SODIUM (BEAKER) 137 meq/L    136-145                   



             (test code = 381)                                        

 

             POTASSIUM (BEAKER) 3.8 meq/L    3.5-5.1                   



             (test code = 379)                                        

 

             CHLORIDE (BEAKER) 107 meq/L                        



             (test code = 382)                                        

 

             CO2 (BEAKER) (test 21 meq/L     22-29        L            



             code = 355)                                         

 

             BLOOD UREA NITROGEN 21 mg/dL     7-21                      



             (BEAKER) (test code                                        



             = 354)                                              

 

             CREATININE (BEAKER) 0.99 mg/dL   0.57-1.25                 



             (test code = 358)                                        

 

             GLUCOSE RANDOM 124 mg/dL           H            



             (BEAKER) (test code                                        



             = 652)                                              

 

             CALCIUM (BEAKER) 9.0 mg/dL    8.4-10.2                  



             (test code = 697)                                        

 

             EGFR (BEAKER) (test 76 mL/min/1.73                           ESTIMA

TERRENCE GFR IS



             code = 1092) sq m                                   NOT AS ACCURATE

 AS



                                                                 CREATININE



                                                                 CLEARANCE IN



                                                                 PREDICTING



                                                                 GLOMERULAR



                                                                 FILTRATION RATE

.



                                                                 ESTIMATED GFR I

S



                                                                 NOT APPLICABLE 

FOR



                                                                 DIALYSIS PATIEN

TS.



[id number] 04:17:00





             Test Item    Value        Reference Range Interpretation Comments

 

             PARTIAL THROMBOPLASTIN TIME 41.7 seconds 22.5-36.0    H            



             (BEAKER) (test code = 760)                                        



PROTHROMBIN [id number] 04:16:00





             Test Item    Value        Reference Range Interpretation Comments

 

             PROTIME (BEAKER) (test code = 14.7 seconds 11.7-14.7               

  



             759)                                                

 

             INR (BEAKER) (test code = 370) 1.1          <=5.9                  

   



RECOMMENDED COUMADIN/WARFARIN INR THERAPY RANGESSTANDARD DOSE: 2.0 - 3.0 
Includes: PROPHYLAXIS for venous thrombosis, systemic embolization; TREATMENT 
for venous thrombosis and/or pulmonary embolus.HIGH RISK: Target INR is 2.5-3.5 
for patients with mechanical heart valves.CBC W/PLT COUNT &amp; AUTO 
[id number] 04:11:00





             Test Item    Value        Reference Range Interpretation Comments

 

             WHITE BLOOD CELL COUNT 9.1 K/ L     3.5-10.5                  



             (BEAKER) (test code =                                        



             775)                                                

 

             RED BLOOD CELL COUNT 3.62 M/ L    4.63-6.08    L            



             (BEAKER) (test code =                                        



             761)                                                

 

             HEMOGLOBIN (BEAKER) 9.9 GM/DL    13.7-17.5    L            



             (test code = 410)                                        

 

             HEMATOCRIT (BEAKER) 30.6 %       40.1-51.0    L            



             (test code = 411)                                        

 

             MEAN CORPUSCULAR 84.5 fL      79.0-92.2                 



             VOLUME (BEAKER) (test                                        



             code = 753)                                         

 

             MEAN CORPUSCULAR 27.3 pg      25.7-32.2                 



             HEMOGLOBIN (BEAKER)                                        



             (test code = 751)                                        

 

             MEAN CORPUSCULAR 32.4 GM/DL   32.3-36.5                 



             HEMOGLOBIN CONC                                        



             (BEAKER) (test code =                                        



             752)                                                

 

             RED CELL DISTRIBUTION 12.6 %       11.6-14.4                 



             WIDTH (BEAKER) (test                                        



             code = 412)                                         

 

             PLATELET COUNT 159 K/CU MM  150-450                   Discordant PL

T



             (BEAKER) (test code =                                        result

s compared to



             756)                                                previous result

s;



                                                                 clinical correl

ation



                                                                 required.

 

             MEAN PLATELET VOLUME 10.6 fL      9.4-12.4                  



             (BEAKER) (test code =                                        



             754)                                                

 

             NUCLEATED RED BLOOD 0 /100 WBC   0-0                       



             CELLS (BEAKER) (test                                        



             code = 413)                                         

 

             NEUTROPHILS RELATIVE 61 %                                   



             PERCENT (BEAKER) (test                                        



             code = 429)                                         

 

             LYMPHOCYTES RELATIVE 26 %                                   



             PERCENT (BEAKER) (test                                        



             code = 430)                                         

 

             MONOCYTES RELATIVE 8 %                                    



             PERCENT (BEAKER) (test                                        



             code = 431)                                         

 

             EOSINOPHILS RELATIVE 5 %                                    



             PERCENT (BEAKER) (test                                        



             code = 432)                                         

 

             BASOPHILS RELATIVE 1 %                                    



             PERCENT (BEAKER) (test                                        



             code = 437)                                         

 

             NEUTROPHILS ABSOLUTE 5.50 K/ L    1.78-5.38    H            



             COUNT (BEAKER) (test                                        



             code = 670)                                         

 

             LYMPHOCYTES ABSOLUTE 2.33 K/ L    1.32-3.57                 



             COUNT (BEAKER) (test                                        



             code = 414)                                         

 

             MONOCYTES ABSOLUTE 0.68 K/ L    0.30-0.82                 



             COUNT (BEAKER) (test                                        



             code = 415)                                         

 

             EOSINOPHILS ABSOLUTE 0.43 K/ L    0.04-0.54                 



             COUNT (BEAKER) (test                                        



             code = 416)                                         

 

             BASOPHILS ABSOLUTE 0.06 K/ L    0.01-0.08                 



             COUNT (BEAKER) (test                                        



             code = 417)                                         

 

             IMMATURE     1 %          0-1                       



             GRANULOCYTES-RELATIVE                                        



             PERCENT (BEAKER) (test                                        



             code = 2801)                                        



RAD, CHEST, 2 [id number] 20:28:00Reason for exam:-&gt;CHEST PAINFINAL 
REPORT PATIENT ID: [id number] History: Chest pain. FINDINGS: Chest, two views: PA 
and lateral views of the chest show a normal appearing heart and mediastinum. 
Multiple sternal wires, some of whichare broken, are present and indicate prior 
median sternotomy. Lungs are clear, free of edema, focal consolidation or 
visible effusions. No pneumothorax. Bones are unremarkable. IMPRESSION: 1. 
Negative for acute cardiopulmonary disease. Signed: Juliette Sow 
Verified Date/Time: 2018 20:28:31 Reading Location: [facility] Diagnostic 
Imaging Reading Room - [place] [address] Electronically signed by:JULIETTE SOW M.D. on 2018 08:28 [id number] 20:23:00





             Test Item    Value        Reference Range Interpretation Comments

 

             MAGNESIUM (BEAKER) (test code = 1.0 mg/dL    1.5-3.0      LL       

    



             627)                                                



RAPID [id number] 20:18:00





             Test Item    Value        Reference Range Interpretation Comments

 

             RAPID CKMB (BEAKER) (test code = 1.5 ng/mL    0.0-4.3              

     



             1482)                                               



RAPID [id number] 20:18:00





             Test Item    Value        Reference Range Interpretation Comments

 

             RAPID MYOGLOBIN (BEAKER) (test code 94 ng/mL     <107              

        



             = 2237)                                             



RAPID TROPONIN -[id number] 20:17:00





             Test Item    Value        Reference Range Interpretation Comments

 

             RAPID TROPONIN I (BEAKER) (test code < ng/mL      <0.05            

         



             = 1483)                                             



B-TYPE NATRIURETIC FACTOR (BNP)2018 20:16:00





             Test Item    Value        Reference Range Interpretation Comments

 

             B-TYPE NATRIURETIC PEPTIDE (BEAKER) 48 pg/mL     0-100             

        



             (test code = 700)                                        



PROTHROMBIN TIME/[id number] 20:12:00





             Test Item    Value        Reference Range Interpretation Comments

 

             PROTIME (BEAKER) (test code = 10.5 seconds 9.8-12.0                

  



             759)                                                

 

             INR (BEAKER) (test code = 370) 1.0          <=5.9                  

   



RECOMMENDED COUMADIN/WARFARIN INR THERAPY RANGESSTANDARD DOSE: 2.0 - 3.0 
Includes: PROPHYLAXIS for venous thrombosis, systemic embolization; TREATMENT 
for venous thrombosis and/or pulmonary embolus.HIGH RISK: Target INR is 2.5-3.5 
for patients with mechanical heart valves.BASIC METABOLIC [id number] 
20:12:00





             Test Item    Value        Reference Range Interpretation Comments

 

             SODIUM (BEAKER) 142 meq/L    135-148                   



             (test code = 381)                                        

 

             POTASSIUM (BEAKER) 4.6 meq/L    3.6-5.5                   



             (test code = 379)                                        

 

             CHLORIDE (BEAKER) 106 meq/L                        



             (test code = 382)                                        

 

             CO2 (BEAKER) (test 22 meq/L     24-32        L            



             code = 355)                                         

 

             BLOOD UREA NITROGEN 22 mg/dL     10-26                     



             (BEAKER) (test code                                        



             = 354)                                              

 

             CREATININE (BEAKER) 1.19 mg/dL   0.50-1.20                 



             (test code = 358)                                        

 

             GLUCOSE RANDOM 194 mg/dL           H            



             (BEAKER) (test code                                        



             = 652)                                              

 

             CALCIUM (BEAKER) 9.6 mg/dL    8.5-10.5                  



             (test code = 697)                                        

 

             EGFR (BEAKER) (test  mL/min/1.73                           INSUFFIC

IENT CLINICAL



             code = 1092) sq m                                   DATA TO CALCULA

TE



                                                                 ESTIMATED GFR.



CBC W/PLT COUNT &amp; AUTO [id number] 20:04:00





             Test Item    Value        Reference Range Interpretation Comments

 

             WHITE BLOOD CELL COUNT 11.3 10e3/i? L 4.0-10.0     H            



             (BEAKER) (test code = 775)                                        

 

             RED BLOOD CELL COUNT (BEAKER) 4.14 10e6/i? L 4.20-5.80    L        

    



             (test code = 761)                                        

 

             HEMOGLOBIN (BEAKER) (test code 11.6 g/dL    13.0-16.8    L         

   



             = 410)                                              

 

             HEMATOCRIT (BEAKER) (test code 35.1 %       40.0-50.0    L         

   



             = 411)                                              

 

             MEAN CORPUSCULAR VOLUME 84.9 fL      82.0-98.0                 



             (BEAKER) (test code = 753)                                        

 

             MEAN CORPUSCULAR HEMOGLOBIN 27.9 pg      27.0-33.0                 



             (BEAKER) (test code = 751)                                        

 

             MEAN CORPUSCULAR HEMOGLOBIN 32.9 g/dL    32.0-36.0                 



             CONC (BEAKER) (test code =                                        



             752)                                                

 

             RED CELL DISTRIBUTION WIDTH 12.3 %       10.3-14.2                 



             (BEAKER) (test code = 412)                                        

 

             PLATELET COUNT (BEAKER) (test 216 10e3/i? L 150-430                

   



             code = 756)                                         

 

             MEAN PLATELET VOLUME (BEAKER) 8.1 fL       6.5-10.5                

  



             (test code = 754)                                        

 

             NEUTROPHILS RELATIVE PERCENT 67 %                                  

 



             (BEAKER) (test code = 429)                                        

 

             LYMPHOCYTES RELATIVE PERCENT 19 %                                  

 



             (BEAKER) (test code = 430)                                        

 

             MONOCYTES RELATIVE PERCENT 9 %                                    



             (BEAKER) (test code = 431)                                        

 

             EOSINOPHILS RELATIVE PERCENT 5 %                                   

 



             (BEAKER) (test code = 432)                                        

 

             BASOPHILS RELATIVE PERCENT 1 %                                    



             (BEAKER) (test code = 437)                                        

 

             NEUTROPHILS ABSOLUTE COUNT 7.54 10e3/i? L 1.80-8.00                

 



             (BEAKER) (test code = 670)                                        

 

             LYMPHOCYTES ABSOLUTE COUNT 2.16 10e3/i? L 1.48-4.50                

 



             (BEAKER) (test code = 414)                                        

 

             MONOCYTES ABSOLUTE COUNT 0.96 10e3/i? L 0.00-1.30                 



             (BEAKER) (test code = 415)                                        

 

             EOSINOPHILS ABSOLUTE COUNT 0.57 10e3/i? L 0.00-0.50    H           

 



             (BEAKER) (test code = 416)                                        

 

             BASOPHILS ABSOLUTE COUNT 0.08 10e3/i? L 0.00-0.20                 



             (BEAKER) (test code = 417)

## 2022-08-25 NOTE — ER
Nurse's Notes                                                                                     

 [facility]                                                                 

Name: Ismael Morrison                                                                               

Age: 67 yrs                                                                                       

Sex: Male                                                                                         

: 1955                                                                                   

MRN: [id number]                                                                                   

Arrival Date: 2022                                                                          

Time: 15:25                                                                                       

Account#: [id number]                                                                            

Bed 5                                                                                             

Private MD:                                                                                       

Diagnosis: Orthostatic hypotension                                                                

                                                                                                  

Presentation:                                                                                     

                                                                                             

15:27 Chief complaint: Patient states: "[facility] and  aa5 

      they said to come here because my blood pressure was too low when I stood up". Pt           

      reports orthostatics of 124/64 supine, 100/60 sitting, and 81/55 standing with              

      dizziness/feeling lightheaded. Pt reports he was admitted at [facility] in [place] and      

      his creatinine was high. Coronavirus screen: At this time, the client does not indicate     

      any symptoms associated with coronavirus-19. Ebola Screen: Patient denies travel to an      

      Ebola-affected area in the 21 days before illness onset. Initial Sepsis Screen: Does        

      the patient meet any 2 criteria? No. Patient's initial sepsis screen is negative. Does      

      the patient have a suspected source of infection? No. Patient's initial sepsis screen       

      is negative. Risk Assessment: Do you want to hurt yourself or someone else? Patient         

      reports no desire to harm self or others. Onset of symptoms was 2022.            

15:27 Acuity: ELÍAS 2                                                                           aa5 

15:27 Method Of Arrival: Wheelchair                                                           aa5 

                                                                                                  

Triage Assessment:                                                                                

15:43 General: Appears in no apparent distress. Behavior is calm, cooperative, appropriate    bp  

      for age. Pain: Denies pain. EENT: No deficits noted. Neuro: Reports dizziness, WHEN         

      STANDING. Cardiovascular: No deficits noted. Respiratory: No deficits noted. GI: No         

      signs and/or symptoms were reported involving the gastrointestinal system. : No signs     

      and/or symptoms were reported regarding the genitourinary system. Derm: No deficits         

      noted. Musculoskeletal: No deficits noted.                                                  

                                                                                                  

Historical:                                                                                       

- Allergies:                                                                                      

15:30 No Known Allergies;                                                                     aa5 

- PMHx:                                                                                           

15:30 CABG; diabetes mellitus; GERD; Gout; Myocardial infarction;                             aa5 

- PSHx:                                                                                           

15:30 Nephrostomy tube; Cardia Stents x 19;                                                   aa5 

                                                                                                  

- Immunization history:: Adult Immunizations unknown.                                             

- Social history:: Smoking status: Patient denies any tobacco usage or history of.                

                                                                                                  

                                                                                                  

Screening:                                                                                        

15:44 Abuse screen: Denies threats or abuse. Denies injuries from another. Nutritional        bp  

      screening: No deficits noted. Tuberculosis screening: No symptoms or risk factors           

      identified. Fall Risk None identified.                                                      

                                                                                                  

Assessment:                                                                                       

15:44 General: SEE TRIAGE NOTE.                                                               bp  

16:48 Reassessment: No changes from previously documented assessment. Patient and/or family   bp  

      updated on plan of care and expected duration. Pain level reassessed.                       

18:25 Reassessment: PT ORTHOSTATIC POSITIVE.                                                  bp  

19:59 Reassessment: Patient appears in no apparent distress at this time. Patient and/or      kl  

      family updated on plan of care and expected duration. Pain level reassessed. Patient is     

      alert, oriented x 3, equal unlabored respirations, skin warm/dry/pink. Patient states       

      feeling better. Patient states symptoms have improved.                                      

20:56 Reassessment: No changes from previously documented assessment. Patient is alert,       kl  

      oriented x 3, equal unlabored respirations, skin warm/dry/pink.                             

                                                                                                  

Vital Signs:                                                                                      

15:28  / 64; Pulse 90; Resp 16 S; Temp 97.1(TE); Pulse Ox 100% on R/A; Weight 105.69 kg aa5 

      (R); Height 6 ft. 0 in. (182.88 cm) (R);                                                    

16:48  / 60; Pulse 73; Resp 14; Pulse Ox 99% ;                                          bp  

17:07  / 60; Pulse 70; Resp 12; Pulse Ox 99% on R/A;                                    bp  

18:16  / 66 Supine; Pulse 83;                                                           bp  

18:18  / 61 Sitting; Pulse 76;                                                          bp  

18:20 BP 99 / 57 Standing; Pulse 70;                                                          bp  

19:51  / 71; Pulse 67; Resp 18; Pulse Ox 99% on R/A;                                    kl  

20:57  / 74; Pulse 68; Resp 17; Pulse Ox 99% on R/A;                                    kl  

21:20  / 68 Supine; Pulse 68 RA;                                                        kl  

21:20  / 65 Sitting; Pulse 78;                                                          kl  

21:20  / 70 Standing; Pulse 87;                                                         kl  

15:28 Body Mass Index 31.60 (105.69 kg, 182.88 cm)                                            aa5 

                                                                                                  

ED Course:                                                                                        

15:25 Patient arrived in ED.                                                                  am2 

15:27 Arm band placed on.                                                                     aa5 

15:30 Triage completed.                                                                       aa5 

15:31 [facility]                                                          snw 

15:31 Alverto Benavidez DO is Attending Physician.                                                snw 

15:42 Yobany Gamez, RN is Primary Nurse.                                                    bp  

15:44 Patient has correct armband on for positive identification. Bed in low position. Call   bp  

      light in reach. Side rails up X2.                                                           

16:00 XRAY Chest (1 view) In Process Unspecified.                                             EDMS

16:20 Inserted saline lock: 20 gauge in right forearm, using aseptic technique. Blood         bp  

      collected.                                                                                  

21:30 No provider procedures requiring assistance completed. IV discontinued, intact,         kl  

      bleeding controlled, No redness/swelling at site. Pressure dressing applied.                

                                                                                                  

Administered Medications:                                                                         

17:00 Drug: NS 0.9% 500 ml Route: IV; Rate: bolus; Site: right forearm;                       bp  

19:20 Drug: NS 0.9% 500 ml Route: IV; Rate: bolus; Site: right antecubital;                   kl  

20:56 Follow up: IV Status: Completed infusion; IV Intake: 500ml                              kl  

                                                                                                  

                                                                                                  

Medication:                                                                                       

15:44 VIS not applicable for this client.                                                     bp  

                                                                                                  

Intake:                                                                                           

20:56 IV: 500ml; Total: 500ml.                                                                kl  

21:30 PO: 320ml (Water); IV: 1000ml; Total: 1820ml.                                           kl  

                                                                                                  

Output:                                                                                           

21:30 Urine: 850ml ([place]); Total: 850ml.                                                     kl  

                                                                                                  

Outcome:                                                                                          

20:33 Discharge ordered by MD.                                                                snw 

21:31 Discharged to home ambulatory.                                                          kl  

21:31 Condition: improved                                                                         

21:31 Discharge instructions given to patient, Instructed on discharge instructions, follow       

      up and referral plans. Demonstrated understanding of instructions, follow-up care.          

21:31 Patient left the ED.                                                                    kl  

                                                                                                  

Signatures:                                                                                       

Dispatcher [employer]                           EDMaria Dolores Roper, RN                     RN   Elicia Monsalve FNP-C                   FNP-Csnw                                                  

Rosita Burnett, RN                     RN   aa5                                                  

Marika Hernandez Brian, RN                      RN   bp                                                   

                                                                                                  

**************************************************************************************************

## 2022-08-25 NOTE — RAD REPORT
EXAM DESCRIPTION:  William Single View8/25/2022 3:58 pm

 

CLINICAL HISTORY:  Hypotension

 

COMPARISON:  none

 

FINDINGS:   The lungs appear clear of acute infiltrate. The heart is normal size

 

Postsurgical changes involve the chest

 

IMPRESSION:   No acute abnormalities displayed

## 2022-08-25 NOTE — EDPHYS
Physician Documentation                                                                           

 [facility]                                                                 

Name: Memphis Prince                                                                               

Age: 67 yrs                                                                                       

Sex: Male                                                                                         

: 1955                                                                                   

MRN: [id number]                                                                                   

Arrival Date: 2022                                                                          

Time: 15:25                                                                                       

Account#: [id number]                                                                            

Bed 5                                                                                             

Private MD:                                                                                       

ED Physician Alverto Benavidez                                                                         

HPI:                                                                                              

                                                                                             

16:07 This 67 yrs old Male presents to ER via Wheelchair with complaints of Blood Pressure    snw 

      Problem - low.                                                                              

16:07 Onset: The symptoms/episode began/occurred gradually. Associated signs and symptoms:    snw 

      Pertinent positives: The patient does not have any pertinent positive signs or symptoms     

      associated with pediatric illness. Modifying factors: The patient symptoms are              

      alleviated by nothing, the patient symptoms are aggravated by standing. The patient has     

      experienced similar episodes in the past, several times. The patient has been recently      

      seen by a physician: the patient's primary care provider, earlier today, with similar       

      presenting complaints, and was sent to the [facility] Emergency      

      Department for further evaluation.                                                          

                                                                                                  

Historical:                                                                                       

- Allergies:                                                                                      

15:30 No Known Allergies;                                                                     aa5 

- PMHx:                                                                                           

15:30 CABG; diabetes mellitus; GERD; Gout; Myocardial infarction;                             aa5 

- PSHx:                                                                                           

15:30 Nephrostomy tube; Cardia Stents x 19;                                                   aa5 

                                                                                                  

- Immunization history:: Adult Immunizations unknown.                                             

- Social history:: Smoking status: Patient denies any tobacco usage or history of.                

                                                                                                  

                                                                                                  

ROS:                                                                                              

16:04 Constitutional: Negative for fever, chills, and weight loss, pt states both nephrostomy snw 

      tubes are patent and draining, right does not drain much and is bloody, left drains "a      

      full bag" about every 2-3 hours. Pt states the [facility] increased his Cardura from        

      12.5mg BID to 25mg BID. As pt has orthostatic hypotension, he has not taken his Cardura     

      at all x 2 days. [facility] and his blood pressure upon standing     

      was 66/35 Eyes: Negative for injury, pain, redness, and discharge, ENT: Negative for        

      injury, pain, and discharge, Neck: Negative for injury, pain, and swelling,                 

      Cardiovascular: Negative for chest pain, palpitations, and edema, Respiratory: Negative     

      for shortness of breath, cough, wheezing, and pleuritic chest pain, Abdomen/GI:             

      Negative for abdominal pain, nausea, vomiting, diarrhea, and constipation, Back:            

      Negative for injury and pain, : Negative for injury, bleeding, discharge, and             

      swelling, MS/Extremity: Negative for injury and deformity, Skin: Negative for injury,       

      rash, and discoloration, Neuro: Negative for headache, weakness, numbness, tingling,        

      and seizure, Psych: Negative for depression, anxiety, suicide ideation, homicidal           

      ideation, and hallucinations.                                                               

                                                                                                  

Exam:                                                                                             

16:04 Constitutional:  This is a well developed, well nourished patient who is awake, alert,  snw 

      and in no acute distress. Head/Face:  Normocephalic, atraumatic. Eyes:  Pupils equal        

      round and reactive to light, extra-ocular motions intact.  Lids and lashes normal.          

      Conjunctiva and sclera are non-icteric and not injected.  Cornea within normal limits.      

      Periorbital areas with no swelling, redness, or edema. ENT:  Nares patent. No nasal         

      discharge, no septal abnormalities noted.  Tympanic membranes are normal and external       

      auditory canals are clear.  Oropharynx with no redness, swelling, or masses, exudates,      

      or evidence of obstruction, uvula midline.  Mucous membranes moist. Neck:  Trachea          

      midline, no thyromegaly or masses palpated, and no cervical lymphadenopathy.  Supple,       

      full range of motion without nuchal rigidity, or vertebral point tenderness.  No            

      Meningismus. Chest/axilla:  Normal chest wall appearance and motion.  Nontender with no     

      deformity.  No lesions are appreciated. Cardiovascular:  Regular rate and rhythm with a     

      normal S1 and S2.  No gallops, murmurs, or rubs.  Normal PMI, no JVD.  No pulse             

      deficits. Respiratory:  Lungs have equal breath sounds bilaterally, clear to                

      auscultation and percussion.  No rales, rhonchi or wheezes noted.  No increased work of     

      breathing, no retractions or nasal flaring. Abdomen/GI:  Soft, non-tender, with normal      

      bowel sounds.  No distension or tympany.  No guarding or rebound.  No evidence of           

      tenderness throughout. Back:  No spinal tenderness.  No costovertebral tenderness.          

      Full range of motion. Skin:  Warm, dry with normal turgor.  Normal color with no            

      rashes, no lesions, and no evidence of cellulitis. MS/ Extremity:  Pulses equal, no         

      cyanosis.  Neurovascular intact.  Full, normal range of motion. Neuro:  Awake and           

      alert, GCS 15, oriented to person, place, time, and situation.  Cranial nerves II-XII       

      grossly intact.  Motor strength 5/5 in all extremities.  Sensory grossly intact.            

      Cerebellar exam normal.  Normal gait. Psych:  Awake, alert, with orientation to person,     

      place and time.  Behavior, mood, and affect are within normal limits.                       

                                                                                                  

Vital Signs:                                                                                      

15:28  / 64; Pulse 90; Resp 16 S; Temp 97.1(TE); Pulse Ox 100% on R/A; Weight 105.69 kg aa5 

      (R); Height 6 ft. 0 in. (182.88 cm) (R);                                                    

16:48  / 60; Pulse 73; Resp 14; Pulse Ox 99% ;                                          bp  

17:07  / 60; Pulse 70; Resp 12; Pulse Ox 99% on R/A;                                    bp  

18:16  / 66 Supine; Pulse 83;                                                           bp  

18:18  / 61 Sitting; Pulse 76;                                                          bp  

18:20 BP 99 / 57 Standing; Pulse 70;                                                          bp  

19:51  / 71; Pulse 67; Resp 18; Pulse Ox 99% on R/A;                                    kl  

20:57  / 74; Pulse 68; Resp 17; Pulse Ox 99% on R/A;                                    kl  

21:20  / 68 Supine; Pulse 68 RA;                                                        kl  

21:20  / 65 Sitting; Pulse 78;                                                          kl  

21:20  / 70 Standing; Pulse 87;                                                         kl  

15:28 Body Mass Index 31.60 (105.69 kg, 182.88 cm)                                            aa5 

                                                                                                  

MDM:                                                                                              

15:42 Patient medically screened.                                                             snw 

19:36 Data reviewed: vital signs, nurses notes. Data interpreted: Pulse oximetry: on room air snw 

      is 99 %. Interpretation: normal. Response to treatment: the patient's symptoms have         

      mildly improved after treatment. ED course: Pt had 500ml bolus but continued with           

      hypotension on standing. Will repeat bolus. Pt stopped carvedilol on Monday. Will have      

      appt [facility]. .                                               

20:31 Special discussion: I have referred the patient to see his PCP for further evaluation   snw 

      of high blood pressure. Based on the history and exam findings, there is no indication      

      for further emergent testing or inpatient evaluation. I discussed with the                  

      patient/guardian the need to see the primary care provider for further evaluation of        

      the symptoms.                                                                               

                                                                                                  

                                                                                             

15:32 Order name: Basic Metabolic Panel; Complete Time: 17:01                                 snw 

                                                                                             

15:32 Order name: CBC with Diff; Complete Time: 16:34                                         snw 

                                                                                             

15:32 Order name: LFT's; Complete Time: 17:                                                 snw 

                                                                                             

15:32 Order name: Magnesium; Complete Time: 17:                                             snw 

                                                                                             

15:32 Order name: Troponin HS; Complete Time: 17:                                           snw 

                                                                                             

15:33 Order name: SARS RAPID; Complete Time: 16:50                                            snw 

                                                                                             

15:32 Order name: XRAY Chest (1 view); Complete Time: 16:50                                   snw 

                                                                                             

15:32 Order name: EKG; Complete Time: 15:33                                                   snw 

                                                                                             

15:32 Order name: Cardiac monitoring; Complete Time: 16:                                    snw 

                                                                                             

15:32 Order name: EKG - Nurse/Tech; Complete Time: 16:23                                      snw 

                                                                                             

15:32 Order name: IV Saline Lock; Complete Time: 16:w 

                                                                                             

15:32 Order name: Labs collected and sent; Complete Time: 16:w 

                                                                                             

15:32 Order name: O2 Per Protocol; Complete Time: 16:                                       snw 

                                                                                             

15:32 Order name: O2 Sat Monitoring; Complete Time: 16:w 

                                                                                             

17:43 Order name: Orthostatics; Complete Time: 18:                                          snw 

                                                                                             

20:31 Order name: Orthostatics                                                                snw 

                                                                                                  

EC:20 Rate is 77 beats/min. Rhythm is regular. QRS Axis is Normal. DC interval is normal. QRS snw 

      interval is normal. QT interval is normal. Clinical impression: NSR w/ Non-specific         

      ST/T Changes.                                                                               

                                                                                                  

Administered Medications:                                                                         

17:00 Drug: NS 0.9% 500 ml Route: IV; Rate: bolus; Site: right forearm;                       bp  

19:20 Drug: NS 0.9% 500 ml Route: IV; Rate: bolus; Site: right antecubital;                   kl  

20:56 Follow up: IV Status: Completed infusion; IV Intake: 500ml                              kl  

                                                                                                  

                                                                                                  

Disposition:                                                                                      

                                                                                             

11:28 Co-signature as Attending Physician, Alverto AYALA was immediately available on-site ms3 

      in the Emergency Department for consultation in the care of the patient. .                  

                                                                                                  

Disposition Summary:                                                                              

22 20:33                                                                                    

Discharge Ordered                                                                                 

      Location: Home                                                                          snw 

      Condition: Stable                                                                       snw 

      Diagnosis                                                                                   

        - Orthostatic hypotension                                                             snw 

      Followup:                                                                               snw 

        - With: Emergency Department                                                               

        - When: As needed                                                                          

        - Reason: Worsening of condition                                                           

      Followup:                                                                               snw 

        - With: Private Physician                                                                  

        - When: Tomorrow                                                                           

        - Reason: as scheduled                                                                     

      Discharge Instructions:                                                                     

        - Discharge Summary Sheet                                                             snw 

        - Hypotension                                                                         snw 

        - Orthostatic Hypotension                                                             snw 

      Forms:                                                                                      

        - Medication Reconciliation Form                                                      snw 

        - Thank You Letter                                                                    snw 

        - Antibiotic Education                                                                snw 

        - Prescription Opioid Use                                                             snw 

Signatures:                                                                                       

Dispatcher [employer]                           EDMS                                                 

Maria Dolores Adam, RN                     RN   Elicia Monsalve, FNP-C                   FNP-Csnw                                                  

Rosita Burnett RN                     RN   aa5                                                  

Yobany Gamez RN                      RN   Alverto Neal DO DO   ms3                                                  

                                                                                                  

**************************************************************************************************

## 2022-08-26 VITALS — SYSTOLIC BLOOD PRESSURE: 133 MMHG | DIASTOLIC BLOOD PRESSURE: 68 MMHG

## 2022-08-26 VITALS — TEMPERATURE: 97.1 F

## 2022-08-26 VITALS — OXYGEN SATURATION: 99 %

## 2022-08-27 NOTE — EKG
Test Date:    2022-08-25               Test Time:    16:20:22

Technician:   BP                                     

                                                     

MEASUREMENT RESULTS:                                       

Intervals:                                           

Rate:         77                                     

SD:           202                                    

QRSD:         96                                     

QT:           372                                    

QTc:          420                                    

Axis:                                                

P:            74                                     

SD:           202                                    

QRS:          -4                                     

T:            111                                    

                                                     

INTERPRETIVE STATEMENTS:                                       

                                                     

Normal sinus rhythm

ST & T wave abnormality, consider lateral ischemia

Abnormal ECG

Compared to ECG 08/20/2022 12:32:02

Atrial premature complex(es) no longer present

ST (T wave) deviation still present

Possible ischemia still present



Electronically Signed On 08-27-22 15:20:01 CDT by Gil Gr

## 2025-02-03 ENCOUNTER — HOSPITAL ENCOUNTER (EMERGENCY)
Dept: HOSPITAL 97 - ER | Age: 70
LOS: 1 days | Discharge: ANOTHER HOSPITAL | End: 2025-02-04
Payer: COMMERCIAL

## 2025-02-03 DIAGNOSIS — E78.00: ICD-10-CM

## 2025-02-03 DIAGNOSIS — Z95.1: ICD-10-CM

## 2025-02-03 DIAGNOSIS — Z95.818: ICD-10-CM

## 2025-02-03 DIAGNOSIS — I24.9: ICD-10-CM

## 2025-02-03 DIAGNOSIS — E11.9: ICD-10-CM

## 2025-02-03 DIAGNOSIS — Z79.82: ICD-10-CM

## 2025-02-03 DIAGNOSIS — I21.4: Primary | ICD-10-CM

## 2025-02-03 DIAGNOSIS — I25.2: ICD-10-CM

## 2025-02-03 LAB
ALBUMIN SERPL BCP-MCNC: 3.4 G/DL (ref 3.4–5)
ALBUMIN/GLOB SERPL: 0.9 {RATIO} (ref 1.1–1.8)
ALP SERPL-CCNC: 173 U/L (ref 45–117)
ALT SERPL W P-5'-P-CCNC: 37 U/L (ref 16–61)
ANION GAP SERPL CALC-SCNC: 15.8 MEQ/L (ref 5–15)
AST SERPL W P-5'-P-CCNC: 24 U/L (ref 15–37)
BILIRUB INDIRECT SERPL-MCNC: 0.1 MG/DL (ref 0.2–0.8)
BUN BLD-MCNC: 30 MG/DL (ref 7–18)
D DIMER BLD IA.DDU-MCNC: 0.8 FEUUG/ML (ref 0–0.5)
GLOBULIN SER CALC-MCNC: 3.8 G/DL (ref 2.3–3.5)
GLUCOSE SERPLBLD-MCNC: 586 MG/DL (ref 74–106)
HCT VFR BLD CALC: 39.6 % (ref 39.6–49)
HGB BLD-MCNC: 12.9 G/DL (ref 13.6–17.9)
INR BLD: 1.08
LYMPHOCYTES # SPEC AUTO: 0.6 K/UL (ref 0.7–4.9)
MAGNESIUM SERPL-MCNC: 1.8 MG/DL (ref 1.6–2.4)
MCH RBC QN AUTO: 27.9 PG (ref 27–35)
MCHC RBC AUTO-ENTMCNC: 32.6 G/DL (ref 32–36)
MCV RBC: 85.6 FL (ref 80–100)
NRBC # BLD: 0 10*3/UL (ref 0–0)
NRBC BLD AUTO-RTO: 0.2 % (ref 0–0)
NT-PROBNP SERPL-MCNC: 341 PG/ML (ref ?–125)
PMV BLD: 7.7 FL (ref 7.6–11.3)
POTASSIUM SERPL-SCNC: 4.8 MEQ/L (ref 3.5–5.1)
PROTHROMBIN TIME: 11.3 SECONDS (ref 9.4–12.5)
RBC # BLD: 4.63 M/UL (ref 4.33–5.43)
TROPONIN I SERPL HS-MCNC: 250.4 PG/ML (ref ?–58.9)
WBC # BLD AUTO: 10.9 THOU/UL (ref 4.3–10.9)

## 2025-02-03 PROCEDURE — 83880 ASSAY OF NATRIURETIC PEPTIDE: CPT

## 2025-02-03 PROCEDURE — 82947 ASSAY GLUCOSE BLOOD QUANT: CPT

## 2025-02-03 PROCEDURE — 83735 ASSAY OF MAGNESIUM: CPT

## 2025-02-03 PROCEDURE — 80076 HEPATIC FUNCTION PANEL: CPT

## 2025-02-03 PROCEDURE — 36415 COLL VENOUS BLD VENIPUNCTURE: CPT

## 2025-02-03 PROCEDURE — 80048 BASIC METABOLIC PNL TOTAL CA: CPT

## 2025-02-03 PROCEDURE — 85379 FIBRIN DEGRADATION QUANT: CPT

## 2025-02-03 PROCEDURE — 93005 ELECTROCARDIOGRAM TRACING: CPT

## 2025-02-03 PROCEDURE — 85610 PROTHROMBIN TIME: CPT

## 2025-02-03 PROCEDURE — 71045 X-RAY EXAM CHEST 1 VIEW: CPT

## 2025-02-03 PROCEDURE — 85025 COMPLETE CBC W/AUTO DIFF WBC: CPT

## 2025-02-03 PROCEDURE — 84484 ASSAY OF TROPONIN QUANT: CPT

## 2025-02-03 NOTE — ER
Nurse's Notes                                                                                     

 [facility] [place]                                                                 

Name: [place] Prince                                                                               

Age: 69 yrs                                                                                       

Sex: Male                                                                                         

: 1955                                                                                   

MRN: [id number]                                                                                   

Arrival Date: 2025                                                                          

Time: 21:22                                                                                       

Account#: [id number]                                                                            

Bed 2                                                                                             

Private MD:                                                                                       

Diagnosis: NSTEMI, ACS                                                                            

                                                                                                  

Presentation:                                                                                     

                                                                                             

21:29 Chief complaint: EMS states: pt c/o chest pain pressure since 1630. pt took 3 sl Nitro  bm8 

      prior to transport and we gave 1 sl nitro, also 324 mg aspirin. started 20g rac.            

      Coronavirus screen: At this time, the client does not indicate any symptoms associated      

      with coronavirus-19. Ebola Screen: Patient negative for fever greater than or equal to      

      101.5 degrees Fahrenheit, and additional compatible Ebola Virus Disease symptoms            

      Patient denies exposure to infectious person. Patient denies travel to an                   

      Ebola-affected area in the 21 days before illness onset. No symptoms or risks               

      identified at this time. Initial Sepsis Screen: Does the patient meet any 2 criteria?       

      No. Patient's initial sepsis screen is negative. Does the patient have a suspected          

      source of infection? No. Patient's initial sepsis screen is negative. Risk Assessment:      

      Do you want to hurt yourself or someone else? Patient reports no desire to harm self or     

      others. Onset of symptoms was 2025 at 16:30. Care prior to arrival:            

      Medication(s) given: ASA, 81 mg, x 4, nitro SL x4 IV initiated. 20 GA, in the right         

      antecubital area.                                                                           

21:29 Method Of Arrival: [facility]: [facility]                                                     bm8 

21:29 Acuity: ELÍAS 2                                                                           bm8 

                                                                                                  

Triage Assessment:                                                                                

21:32 General: Appears in no apparent distress. uncomfortable, Behavior is calm, cooperative, bm8 

      appropriate for age. Pain: Complains of pain in chest Pain currently is 9.5 out of 10       

      on a pain scale. Quality of pain is described as heavy, pressure, Pain began \T\1630.         

      EENT: No deficits noted. Neuro: No deficits noted. Level of Consciousness is awake,         

      alert, obeys commands, Oriented to person, place, time, situation. Cardiovascular:          

      Reports chest pain, Heart tones S1 S2 present Capillary refill < 3 seconds in bilateral     

      fingers Patient's skin is warm and dry. Rhythm is sinus tachycardia Chest pain is           

      described as diffuse, severe, Pain is 9.5 out of 10 on a pain scale. quality is             

      heaviness, pressure, is located in substernal area. Respiratory: Airway is patent           

      Respiratory effort is even, unlabored, Respiratory pattern is regular, symmetrical,         

      Breath sounds are clear bilaterally. GI: No signs and/or symptoms were reported             

      involving the gastrointestinal system. : No signs and/or symptoms were reported           

      regarding the genitourinary system. Derm: No signs and/or symptoms reported regarding       

      the dermatologic system. Musculoskeletal: No signs and/or symptoms reported regarding       

      the musculoskeletal system.                                                                 

                                                                                                  

Historical:                                                                                       

- Home Meds:                                                                                      

22:07 Dulcolax (bisacodyl) 5 mg Oral tablet, delayed release (enteric coated) 1 tab daily     bm8 

      [Active]; melatonin 10 mg Oral tablet 1 tab daily [Active]; isosorbide mononitrate 30       

      mg Oral Tablet, Extended Release 24 hr 1 tab daily [Active]; tamsulosin 0.4 mg oral         

      capsule 1 cap daily [Active]; cyanocobalamin (vitamin B-12) 1,000 mcg oral tablet 1 tab     

      daily [Active]; pantoprazole 40 mg oral tablet, delayed release (enteric coated) 1 tab      

      daily [Active]; ergocalciferol (vitamin D2) 1,250 mcg (50,000 unit) oral capsule 1 cap      

      every week [Active]; aspirin 81 mg Oral tablet,chewable 1 tab daily [Active];               

      fludrocortisone 0.1 mg oral tablet 1 tab daily [Active]; duloxetine 30 mg oral              

      capsule,delayed release (e.c.) 1 cap daily [Active]; ranolazine 500 mg oral Tablet,         

      Extended Release 12 hr 1 tab 2 times per day [Active]; carvedilol 6.25 mg oral tablet 1     

      tab 2 times per day [Active]; acetaminophen-codeine 300-15 mg Oral tablet 1 tab every 8     

      hours for pain [Active]; nifedipine 60 mg Oral tablet, extended release 1 tab daily         

      [Active]; folic acid 1 mg Oral tablet 1 tab daily [Active]; clopidogrel 75 mg oral          

      tablet 1 tab daily [Active]; allopurinol 100 mg Oral tablet 1 tabs daily [Active];          

      atorvastatin 80 mg oral tablet 1 tab daily [Active];                                        

- PMHx:                                                                                           

21:32 CABG; Cardic stent x19; diabetes mellitus; GERD; Gout; Myocardial infarction;           bm8 

22:17 Hypercholesterolemia;                                                                   bm8 

- PSHx:                                                                                           

21:32 Cardia Stents x 19; Nephrostomy tube;                                                   bm8 

                                                                                                  

- Immunization history:: Adult Immunizations up to date.                                          

- Infectious Disease History:: Denies.                                                            

- Social history:: Smoking status: Patient denies any tobacco usage or history of.                

- Family history:: not pertinent.                                                                 

                                                                                                  

                                                                                                  

Screenin:59 [facility] ED Fall Risk Assessment (Adult) History of falling in the last 3 months,       bm8 

      including since admission No falls in past 3 months (0 pts) Confusion or Disorientation     

      No (0 pts) Intoxicated or Sedated No (0 pts) Impaired Gait No (0 pts) Mobility Assist       

      Device Used No (0 pt) Altered Elimination No (0 pt) Score/Fall Risk Level 0 - 2 = Low       

      Risk Oriented to surroundings, Maintained a safe environment, Educated pt \T\ family on     

      fall prevention, incl call for assistance when getting out of bed, Assessed \T\             

      reinforced patient's understanding of fall precautions, Hourly rounding (assess needs \T\   

      fall precautionary measures) done, Used ambulatory aids as needed (educated on \T\          

      assisted with), Used gait belt as appropriate. Abuse screen: Denies threats or abuse.       

      Nutritional screening: No deficits noted. Tuberculosis screening: No symptoms or risk       

      factors identified.                                                                         

                                                                                                  

Assessment:                                                                                       

22:59 Reassessment: Patient and/or family updated on plan of care and expected duration. Pain bm8 

      level reassessed. Patient is alert, oriented x 3, equal unlabored respirations, skin        

      warm/dry/pink. General: Appears in no apparent distress. comfortable, Behavior is calm,     

      cooperative, appropriate for age. Pain: Complains of pain in chest Pain does not            

      radiate. Pain currently is 8 out of 10 on a pain scale. Quality of pain is described as     

      pressure, sharp. Neuro: No deficits noted. Level of Consciousness is awake, alert,          

      obeys commands, Oriented to person, place, time, situation, Appropriate for age.            

      Cardiovascular: Reports chest pain, lightheadedness, Capillary refill < 3 seconds in        

      bilateral fingers Patient's skin is warm and dry. Rhythm is sinus tachycardia.              

      Respiratory: Airway is patent Respiratory effort is even, unlabored, Respiratory            

      pattern is regular, symmetrical, Breath sounds are clear bilaterally.                       

                                                                                             

02:58 Reassessment: Patient appears in no apparent distress at this time. Patient and/or      bm8 

      family updated on plan of care and expected duration. Pain level reassessed. Patient is     

      alert, oriented x 3, equal unlabored respirations, skin warm/dry/pink. Patient states       

      feeling better. Patient states symptoms have improved. Pain: Complains of pain in chest     

      Pain currently is 6 out of 10 on a pain scale.                                              

03:28 Reassessment: report to ANNEL escalona at [facility].                                8 

03:48 Reassessment: Patient appears in no apparent distress at this time. No changes from     bm8 

      previously documented assessment. Patient and/or family updated on plan of care and         

      expected duration. Pain level reassessed. Patient is alert, oriented x 3, equal             

      unlabored respirations, skin warm/dry/pink. Patient states feeling better. Patient          

      states symptoms have improved. Pain:. Pain: Complains of pain in chest Pain currently       

      is 2 out of 10 on a pain scale. Cardiovascular: Reports chest pain, lightheadedness,        

      Rhythm is sinus tachycardia with 1st degree heart block. Respiratory: Airway is patent      

      Respiratory effort is even, unlabored, Respiratory pattern is regular, symmetrical.         

                                                                                                  

Vital Signs:                                                                                      

                                                                                             

21:29  / 93; Pulse 117; Resp 20; Temp 98.8; Pulse Ox 99% ; Weight 123.83 kg; Height 6   bm8 

      ft. 0 in. ; Pain 9/10;                                                                      

22:59  / 62; Pulse 119; Resp 20; Temp 98.8; Pulse Ox 99% ; Pain 8/10;                   8 

                                                                                             

02:58  / 69; Pulse 109; Resp 21; Temp 98.8; Pulse Ox 99% ; Pain 6/10;                   bm8 

03:48  / 73; Pulse 105; Resp 20; Temp 98.7; Pulse Ox 99% ; Pain 2/10;                   8 

                                                                                             

21:29 Body Mass Index 37.03 (123.83 kg, 182.88 cm)                                            8 

                                                                                             

21:29 Pain Scale: Adult                                                                       bm8 

22:59 Pain Scale: Adult                                                                       bm8 

                                                                                             

02:58 Pain Scale: Adult                                                                       bm8 

03:48 Pain Scale: Adult                                                                       bm8 

                                                                                                  

Nisa Coma Score:                                                                               

                                                                                             

22:43 Eye Response: spontaneous(4). Motor Response: obeys commands(6). Verbal Response:       sp4 

      oriented(5). Total: 15.                                                                     

22:59 Eye Response: spontaneous(4). Motor Response: obeys commands(6). Verbal Response:       bm8 

      oriented(5). Total: 15.                                                                     

                                                                                             

03:48 Eye Response: spontaneous(4). Motor Response: obeys commands(6). Verbal Response:       bm8 

      oriented(5). Total: 15.                                                                     

                                                                                                  

ED Course:                                                                                        

                                                                                             

21:25 Patient arrived in ED.                                                                  rv1 

21:28 Brendan Davidson, RN is Primary Nurse.                                                    bm8 

21:32 Triage completed.                                                                       bm8 

21:32 Arm band placed on right wrist.                                                         bm8 

21:44 Immanuel Post MD is Attending Physician.                                           sp4 

21:45 XRAY Chest (1 view) In Process Unspecified.                                             EDMS

22:27 Inserted saline lock: 20 gauge in left forearm, using aseptic technique. Blood          vk  

      collected. Flushed with 10 mL NS.                                                           

22:38 Initiated transfer with Jesse at [facility].                                           rv1 

22:59 Patient has correct armband on for positive identification. Bed in low position. Call   bm8 

      light in reach. Side rails up X 1. Adult w/ patient. Provided Education on: need for        

      transfer. Client placed on continuous cardiac and pulse oximetry monitoring. NIBP           

      monitoring applied. Cardiac monitor on. Pulse ox on. NIBP on. Door closed. Noise            

      minimized. Warm blanket given. Pillow given. Verbal reassurance given. Head of bed.         

22:59 No provider procedures requiring assistance completed. Initial lab(s) drawn, by me,     bm8 

      sent to lab. Inserted saline lock: Maintain EMS IV. Dressing intact. Good blood return      

      noted. Site clean \T\ dry. Gauge \T\ site: 20rac. Flushed with 10 mL NS. Patient maintains  

      SpO2 saturation greater than 95% on room air.                                               

23:11 Doc to Doc with cardiologist at [facility].                                                 rv1 

23:20 Doc to Doc with second cardiologist at [facility].                                          rv1 

                                                                                             

01:18 Called for update on transfer, Jesse stated they are still waiting for bed to be       rv1 

      cleaned. Gave updated Trop for cardiologist.                                                

02:35 Transfer delayed due to waiting for bed assignment at [facility]. Jesse stated they are    rv 

      still waiting for a bed.                                                                    

02:58 Pt accepted by Dr. Izquierdo to [place] [address] bed 2. Report #[id number].                  rv1 

03:13 Elicia with [facility] gave 15-20 min ETA.                                              rv1 

03:48 Patient transferred, IV remains in place.                                               bm8 

                                                                                                  

Administered Medications:                                                                         

                                                                                             

22:06 Drug: morphine IVP or IV 4 mg IVP once over 4 mins Route: IVP; Infused Over: 4 mins;    bm8 

      Site: right antecubital;                                                                    

                                                                                             

00:12 Follow up: Response: No adverse reaction                                                [facility] 

                                                                                             

22:06 Drug: Ondansetron IVP 4 mg IVP once; over 2 minutes Route: IVP; Site: right antecubital;[facility] 

                                                                                             

00:12 Follow up: Response: No adverse reaction                                                [facility] 

                                                                                             

22:06 Drug: hydrALAZINE IVP 10 mg IVP once Route: IVP; Site: right antecubital;               8 

                                                                                             

00:12 Follow up: Response: No adverse reaction                                                [facility] 

                                                                                             

22:06 Drug: NS 0.9% IV 1000 ml IV at 100 ml/hr Per protocol; to be given as a bolus over 60   bm8 

      minutes Route: IV; Rate: 100 ml/hr; Site: right antecubital;                                

                                                                                             

03:50 Follow up: Response: No adverse reaction; IV Status: Infusion continued upon transfer   [facility] 

                                                                                             

22:21 Drug: Solu-CORTEF  mg IVP once Route: IVP; Site: right antecubital;              8 

                                                                                             

00:12 Follow up: Response: No adverse reaction                                                [facility] 

                                                                                             

22:46 Drug: Heparin (MI Drip) 12 units/kg/hr - ([place] [address] units, D5W  ml) IV at  [facility] 

      calculated rate Per protocol; Max initial rate 1000 units/hr {Co-Signature: al5             

      (Marika Hudson RN).} Route: IV; Rate: calculated rate; Site: right antecubital;         

                                                                                             

03:50 Follow up: Response: No adverse reaction; IV Status: Infusion continued upon transfer   [facility] 

                                                                                             

22:46 Drug: morphine IVP or IV 4 mg IVP once over 4 mins Route: IVP; Infused Over: 4 mins;    8 

      Site: right antecubital;                                                                    

22:55 Follow up: Response: No adverse reaction                                                [facility] 

22:48 Drug: Heparin (MI-Bolus No thrombolytic) - HEParin IVP 60 units/kg IVP once; Max 5000   bm8 

      units {Co-Signature: ha1 (Sonal Killian RN).} Route: IVP; Site: right antecubital;           

                                                                                             

00:13 Follow up: Response: No adverse reaction                                                bm8 

                                                                                             

22:55 Drug: Insulin Regular Human IVP 10 units IVP once {Co-Signature: al5 (Marika Hudson bm8 

      RN).} Route: IVP; Site: left forearm;                                                       

                                                                                             

00:13 Follow up: Response: No adverse reaction                                                bm8 

00:08 Drug: morphine IVP or IV 4 mg IVP once over 4 mins Route: IVP; Infused Over: 4 mins;    bm8 

      Site: left forearm;                                                                         

00:13 Follow up: Response: No adverse reaction                                                bm8 

00:08 Drug: Ondansetron IVP 4 mg IVP once; over 2 minutes Route: IVP; Site: right antecubital;bm8 

00:13 Follow up: Response: No adverse reaction                                                bm8 

02:59 Drug: morphine 4 mg IVP once over 4 mins Route: IVP; Infused Over: 4 mins; Site: left   bm8 

      forearm;                                                                                    

03:50 Follow up: Response: No adverse reaction                                                bm8 

02:59 Drug: Zofran (Ondansetron) 4 mg IVP once; over 2 minutes Route: IVP; Site: left forearm;bm8 

03:49 Follow up: Response: No adverse reaction                                                bm8 

03:50 Follow up: Response: No adverse reaction                                                bm8 

03:37 Drug: Nitroglycerin Transdermal Ointment 2 % 0.5 inches Transdermal once Route:         bm8 

      Transdermal; Site: anterior chest wall;                                                     

03:49 Follow up: Response: No adverse reaction                                                bm8 

                                                                                                  

                                                                                                  

Medication:                                                                                       

                                                                                             

22:59 VIS not applicable for this client.                                                     bm8 

                                                                                                  

Point of Care Testing:                                                                            

      Blood Glucose:                                                                              

21:32 Blood Glucose: High (>450 mg/dL);                                                       bm8 

21:32 >500 , Read HIGH                                                                        bm8 

      Ranges:                                                                                     

                                                                                                  

Outcome:                                                                                          

22:49 ER care complete, transfer ordered by MD. soto 

                                                                                             

03:48 Transferred by ground EMS to [facility], Transfer form completed.    bm8 

      X-rays sent w/ patient.                                                                     

      Condition: stable                                                                           

      Instructed on follow up and referral plans. the need for transfer, Demonstrated             

      understanding of follow-up care, medications,                                               

03:51 Patient left the ED.                                                                    bm8 

                                                                                                  

Signatures:                                                                                       

Dispatcher [employer]                           EDMS                                                 

Olsen Rukhsana                            rv1                                                  

Immanuel Post MD MD   sp4                                                  

Angie Phelps Brad, RN                      RN   bm8                                                  

Sonal Killian RN                              ha1                                                  

Marika Hudson RN                         al5                                                  

                                                                                                  

Corrections: (The following items were deleted from the chart)                                    

                                                                                             

22:16 21:32 Home Meds: Unable to obtain; bm8                                                  bm8 

22:43 21:29  / 93; Pulse 117bpm; Resp 20bpm; Pulse Ox 99%; Temp 98.8F; 113.4 kg; Height bm8 

      6 ft. 0 in.; BMI: 33.9; Pain 9/10, Adult; bm8                                               

23:24 22:38 Initiated transfer with Jesse at [facility] rv1                                  rv1 

                                                                                                  

**************************************************************************************************

## 2025-02-03 NOTE — EDPHYS
Physician Documentation                                                                           

 [facility]                                                                 

Name: Circle Pines Prince                                                                               

Age: 69 yrs                                                                                       

Sex: Male                                                                                         

: 1955                                                                                   

MRN: [id number]                                                                                   

Arrival Date: 2025                                                                          

Time: 21:22                                                                                       

Account#: [id number]                                                                            

Bed 2                                                                                             

Private MD:                                                                                       

ED Physician Immanuel Post                                                                    

HPI:                                                                                              

                                                                                             

21:45 This 69 yrs old  Male presents to ER via EMS with complaints of Chest Pain >   sp4 

      29 y/o.                                                                                     

22:40 69-year-old male presents with moderate to severe midsternal chest pain by EMS. Patient sp4 

      states midsternal pain worsening today starting at 4:30 PM. Patient took his Plavix at      

      home and EMS administered aspirin 384 mg. Patient has history of 22 stents done mostly      

      [facility]. Last stent approximately 1 year ago. History of CABG as well. Patient is on      

      multiple medications including Plavix and fludrocortisone..                                 

                                                                                                  

Historical:                                                                                       

- Home Meds:                                                                                      

22:07 Dulcolax (bisacodyl) 5 mg Oral tablet, delayed release (enteric coated) 1 tab daily     bm8 

      [Active]; melatonin 10 mg Oral tablet 1 tab daily [Active]; isosorbide mononitrate 30       

      mg Oral Tablet, Extended Release 24 hr 1 tab daily [Active]; tamsulosin 0.4 mg oral         

      capsule 1 cap daily [Active]; cyanocobalamin (vitamin B-12) 1,000 mcg oral tablet 1 tab     

      daily [Active]; pantoprazole 40 mg oral tablet, delayed release (enteric coated) 1 tab      

      daily [Active]; ergocalciferol (vitamin D2) 1,250 mcg (50,000 unit) oral capsule 1 cap      

      every week [Active]; aspirin 81 mg Oral tablet,chewable 1 tab daily [Active];               

      fludrocortisone 0.1 mg oral tablet 1 tab daily [Active]; duloxetine 30 mg oral              

      capsule,delayed release (e.c.) 1 cap daily [Active]; ranolazine 500 mg oral Tablet,         

      Extended Release 12 hr 1 tab 2 times per day [Active]; carvedilol 6.25 mg oral tablet 1     

      tab 2 times per day [Active]; acetaminophen-codeine 300-15 mg Oral tablet 1 tab every 8     

      hours for pain [Active]; nifedipine 60 mg Oral tablet, extended release 1 tab daily         

      [Active]; folic acid 1 mg Oral tablet 1 tab daily [Active]; clopidogrel 75 mg oral          

      tablet 1 tab daily [Active]; allopurinol 100 mg Oral tablet 1 tabs daily [Active];          

      atorvastatin 80 mg oral tablet 1 tab daily [Active];                                        

- PMHx:                                                                                           

21:32 CABG; Cardic stent x19; diabetes mellitus; GERD; Gout; Myocardial infarction;           bm8 

22:17 Hypercholesterolemia;                                                                   bm8 

- PSHx:                                                                                           

21:32 Cardia Stents x 19; Nephrostomy tube;                                                   bm8 

                                                                                                  

- Immunization history:: Adult Immunizations up to date.                                          

- Infectious Disease History:: Denies.                                                            

- Social history:: Smoking status: Patient denies any tobacco usage or history of.                

- Family history:: not pertinent.                                                                 

                                                                                                  

                                                                                                  

ROS:                                                                                              

22:43 Constitutional: Negative for fever, chills, and weight loss,    positive midsternal     sp4 

      chest pain positive shortness of breath                                                     

22:43 All other systems are negative,                                                             

                                                                                                  

Exam:                                                                                             

22:43 Constitutional:  This is a well developed, well nourished patient who is awake, alert,  sp4 

      and in no acute distress. Head/Face:  Normocephalic, atraumatic. Eyes:  Pupils equal        

      round and reactive to light, extra-ocular motions intact.  Lids and lashes normal.          

      Conjunctiva and sclera are not injected.  Cornea within normal limits.  Periorbital         

      areas with no swelling, redness, or edema. ENT:  Nares patent. No nasal discharge, no       

      septal abnormalities noted.  Tympanic membranes are normal and external auditory canals     

      are clear.  Oropharynx with no redness, swelling, or masses, exudates, or evidence of       

      obstruction, uvula midline.  Mucous membranes moist. Neck:  Trachea midline, no             

      thyromegaly or masses palpated, and no cervical lymphadenopathy.  Supple, full range of     

      motion without nuchal rigidity, or vertebral point tenderness.  Chest/axilla:  Normal       

      chest wall appearance and motion.  Nontender with no deformity.  No lesions are             

      appreciated. Cardiovascular:  Regular rate and rhythm with a normal S1 and S2.  No          

      gallops, murmurs, or rubs.  Normal PMI, no JVD.  No pulse deficits. Respiratory:  Lungs     

      have equal breath sounds bilaterally, clear to auscultation and percussion.  No rales,      

      rhonchi or wheezes noted.  No increased work of breathing, no retractions or nasal          

      flaring. Abdomen/GI:  Soft,  with normal bowel sounds.  No distension or tympany.  No       

      guarding or rebound.  No evidence of tenderness throughout. Back:  No spinal                

      tenderness.  No costovertebral tenderness.     Skin:  Warm, dry with normal turgor.         

      Normal color with no rashes, no lesions, and no evidence of cellulitis. MS/ Extremity:      

      Pulses equal, no cyanosis.  Neurovascular intact.  Full, normal range of motion. Neuro:     

       Awake and alert, GCS 15, oriented to person, place, time, and situation.  Cranial          

      nerves II-XII grossly intact.  Motor strength 5/5 in all extremities.  Sensory grossly      

      intact.  Psych:  Awake, alert, with orientation to person, place and time.  Behavior,       

      mood, and affect are within normal limits                                                   

22:43 ECG was reviewed by the Attending Physician. EKG today at 21 24   acute tachycardia         

      rate 116,                                                                                   

                                                                                             

02:11 Repeat EKG 0037 EKG reveals sinus tachycardia rate 110 first-degree AV block, no ST     sp4 

      elevations, no ST depressions, otherwise normal intervals, no ectopy.  Sinus                

      tachycardia rate 110                                                                        

                                                                                                  

Vital Signs:                                                                                      

                                                                                             

21:29  / 93; Pulse 117; Resp 20; Temp 98.8; Pulse Ox 99% ; Weight 123.83 kg; Height 6   bm8 

      ft. 0 in. ; Pain 9/10;                                                                      

22:59  / 62; Pulse 119; Resp 20; Temp 98.8; Pulse Ox 99% ; Pain 8/10;                   8 

                                                                                             

02:58  / 69; Pulse 109; Resp 21; Temp 98.8; Pulse Ox 99% ; Pain 6/10;                   bm8 

03:48  / 73; Pulse 105; Resp 20; Temp 98.7; Pulse Ox 99% ; Pain 2/10;                   bm8 

                                                                                             

21:29 Body Mass Index 37.03 (123.83 kg, 182.88 cm)                                            8 

                                                                                             

21:29 Pain Scale: Adult                                                                       bm8 

22:59 Pain Scale: Adult                                                                       bm8 

                                                                                             

02:58 Pain Scale: Adult                                                                       bm8 

03:48 Pain Scale: Adult                                                                       bm8 

                                                                                                  

[place] Coma Score:                                                                               

                                                                                             

22:43 Eye Response: spontaneous(4). Motor Response: obeys commands(6). Verbal Response:       sp4 

      oriented(5). Total: 15.                                                                     

22:59 Eye Response: spontaneous(4). Motor Response: obeys commands(6). Verbal Response:       bm8 

      oriented(5). Total: 15.                                                                     

                                                                                             

03:48 Eye Response: spontaneous(4). Motor Response: obeys commands(6). Verbal Response:       bm8 

      oriented(5). Total: 15.                                                                     

                                                                                                  

MDM:                                                                                              

                                                                                             

22:20 Medical Screening Exam initiated                                                        sp4 

22:47 Differential diagnosis: acute pericarditis, anxiety, coronary artery disease chest wall sp4 

      pain, congestive heart failure Cholelithiasis. HEART Score: History: Highly Suspicious      

      (2), ECG: Non specific repolarization disturbance / LBTB / PM (1), Age: > or = 65 years     

      (2), Risk Factors: > or = 3 Risk factors for atherosclerotic disease (2), Troponin: >       

      or = 3 x Normal Limit (2), Total Score = 9. The patient was not given aspirin in the        

      Emergency Department. Administered by EMS. Data reviewed: vital signs, nurses notes,        

      EMS record, old medical records, lab test result(s), EKG, radiologic studies, plain         

      films. ED course: EXAM: Chest Single View HISTORY: CHEST PAIN COMPARISON: 2022         

      FINDINGS: LUNGS/PLEURA: The lungs are clear. No pleural effusions or pneumothorax. No       

      pulmonary edema. MEDIASTINUM: The mediastinal silhouette is within normal limits.           

      CARDIAC: Mild cardiomegaly UPPER ABDOMEN: No significant abnormality. BONES:                

      Sternotomy. No acute abnormality. LINES/TUBES/OTHER: N/A IMPRESSION: No evidence of         

      acute cardiopulmonary disease. Reported By: Jose Menjivar Electronically Signed:             

      2/3/2025 9:51 PM .                                                                          

                                                                                             

01:42 ED course: Repeat troponin almost 4000 and patient has been accepted however is         sp4 

      awaiting for bed assignment. Will consider aeromedical transport..                          

                                                                                                  

                                                                                             

21:29 Order name: Basic Metabolic Panel; Complete Time: 22:34                                 8 

                                                                                             

21:29 Order name: CBC with Diff; Complete Time: 02:54                                         bm8 

                                                                                             

21:29 Order name: D-Dimer; Complete Time: 22:34                                               8 

                                                                                             

21:29 Order name: LFT's; Complete Time: 22:34                                                 8 

                                                                                             

21:29 Order name: Magnesium; Complete Time: 22:34                                             8 

                                                                                             

21:29 Order name: NT PRO-BNP; Complete Time: 22:34                                            bm8 

                                                                                             

21:29 Order name: PT-INR; Complete Time: 22:34                                                bm8 

                                                                                             

21:29 Order name: Troponin HS; Complete Time: 22:34                                           bm8 

                                                                                             

21:50 Order name: Glucose, Ancillary Testing; Complete Time: 21:56                            EDMS

04                                                                                             

00:10 Order name: Troponin High Sensitivity; Complete Time: 01:41                             sp4 

                                                                                             

00:23 Order name: Glucose, Ancillary Testing; Complete Time: 01:16                            EDMS

                                                                                             

00:51 Order name: Manual Differential; Complete Time: 02:54                                   EDMS

                                                                                             

21:29 Order name: XRAY Chest (1 view); Complete Time: 21:56                                   bm8 

                                                                                             

21:29 Order name: Cardiac monitoring; Complete Time: 21:29                                    bm8 

                                                                                             

21:29 Order name: EKG - Nurse/Tech; Complete Time: 21:29                                      bm8 

                                                                                             

21:29 Order name: IV Saline Lock; Complete Time: 21:29                                        bm8 

                                                                                             

21:29 Order name: Labs collected and sent; Complete Time: 21:29                               bm8 

                                                                                             

21:29 Order name: O2 Per Protocol; Complete Time: 21:29                                       bm8 

                                                                                             

21:29 Order name: O2 Sat Monitoring; Complete Time: 21:29                                     bm8 

                                                                                                  

EC/03                                                                                             

21:24 Rate is 116 beats/min. Rhythm is regular, Sinus tachycardia. QRS Axis is Normal. [place]     sp4 

      interval is normal. QRS interval is normal. QT interval is normal. No Q waves. T waves      

      are Normal. No ST changes noted. Clinical impression: No evidence of ischemia.              

      Interpreted by me. Reviewed by me.                                                          

                                                                                                  

Administered Medications:                                                                         

22:06 Drug: morphine IVP or IV 4 mg IVP once over 4 mins Route: IVP; Infused Over: 4 mins;    8 

      Site: right antecubital;                                                                    

                                                                                             

00:12 Follow up: Response: No adverse reaction                                                [facility] 

                                                                                             

22:06 Drug: Ondansetron IVP 4 mg IVP once; over 2 minutes Route: IVP; Site: right antecubital;bm8 

                                                                                             

00:12 Follow up: Response: No adverse reaction                                                [facility] 

                                                                                             

22:06 Drug: hydrALAZINE IVP 10 mg IVP once Route: IVP; Site: right antecubital;               8 

                                                                                             

00:12 Follow up: Response: No adverse reaction                                                [facility] 

                                                                                             

22:06 Drug: NS 0.9% IV 1000 ml IV at 100 ml/hr Per protocol; to be given as a bolus over 60   bm8 

      minutes Route: IV; Rate: 100 ml/hr; Site: right antecubital;                                

                                                                                             

03:50 Follow up: Response: No adverse reaction; IV Status: Infusion continued upon transfer   [facility] 

                                                                                             

22:21 Drug: Solu-CORTEF  mg IVP once Route: IVP; Site: right antecubital;              8 

                                                                                             

00:12 Follow up: Response: No adverse reaction                                                [facility] 

                                                                                             

22:46 Drug: Heparin (MI Drip) 12 units/kg/[place] - ([place] [address] units, D5W  ml) IV at  bm8 

      calculated rate Per protocol; Max initial rate 1000 units/hr {Co-Signature: ariadna             

      (Marika Hudson RN).} Route: IV; Rate: calculated rate; Site: right antecubital;         

                                                                                             

03:50 Follow up: Response: No adverse reaction; IV Status: Infusion continued upon transfer   [facility] 

                                                                                             

22:46 Drug: morphine IVP or IV 4 mg IVP once over 4 mins Route: IVP; Infused Over: 4 mins;    8 

      Site: right antecubital;                                                                    

22:55 Follow up: Response: No adverse reaction                                                [facility] 

22:48 Drug: Heparin (MI-Bolus No thrombolytic) - HEParin IVP 60 units/kg IVP once; Max 5000   bm8 

      units {Co-Signature: jocy (Sonal Killian RN).} Route: IVP; Site: right antecubital;           

                                                                                             

00:13 Follow up: Response: No adverse reaction                                                [facility] 

                                                                                             

22:55 Drug: Insulin Regular Human IVP 10 units IVP once {Co-Signature: ariadna (Marika Hudson 

      RN).} Route: IVP; Site: left forearm;                                                       

                                                                                             

00:13 Follow up: Response: No adverse reaction                                                8 

00:08 Drug: morphine IVP or IV 4 mg IVP once over 4 mins Route: IVP; Infused Over: 4 mins;    8 

      Site: left forearm;                                                                         

00:13 Follow up: Response: No adverse reaction                                                8 

00:08 Drug: Ondansetron IVP 4 mg IVP once; over 2 minutes Route: IVP; Site: right antecubital;bm8 

00:13 Follow up: Response: No adverse reaction                                                bm8 

02:59 Drug: morphine 4 mg IVP once over 4 mins Route: IVP; Infused Over: 4 mins; Site: left   bm8 

      forearm;                                                                                    

03:50 Follow up: Response: No adverse reaction                                                bm8 

02:59 Drug: Zofran (Ondansetron) 4 mg IVP once; over 2 minutes Route: IVP; Site: left forearm;bm8 

03:49 Follow up: Response: No adverse reaction                                                bm8 

03:50 Follow up: Response: No adverse reaction                                                bm8 

03:37 Drug: Nitroglycerin Transdermal Ointment 2 % 0.5 inches Transdermal once Route:         bm8 

      Transdermal; Site: anterior chest wall;                                                     

03:49 Follow up: Response: No adverse reaction                                                bm8 

                                                                                                  

                                                                                                  

Point of Care Testing:                                                                            

      Blood Glucose:                                                                              

                                                                                             

21:32 Blood Glucose: High (>450 mg/dL);                                                       bm8 

21:32 >500 , Read HIGH                                                                        bm8 

      Ranges:                                                                                     

      Critical Glucose Levels:Adult <50 mg/dl or >400 mg/dl  <40 mg/dl or >180 mg/dl       

Disposition:                                                                                      

22:50 Critical Care:.                                                                         sp4 

                                                                                                  

Disposition Summary:                                                                              

25 22:49                                                                                    

Transfer Ordered                                                                                  

 Notes:       Transfer Location: [facility]                              
  sp4

      Reason: Higher level of care                                                            sp4 

      Condition: Stable                                                                       sp4 

      Problem: new                                                                            sp4 

      Symptoms: are unchanged                                                                 sp4 

      Accepting Physician: [facility]'s attending MD(25 03:51)                     bm8 

      Diagnosis                                                                                   

        - NSTEMI,    ACS                                                                      sp4 

      Forms:                                                                                      

        - Medication Reconciliation Form                                                      sp4 

        - SBAR form                                                                           sp4 

Critical care time excluding procedures:                                                          

22:50 Critical care time: Bedside Care: 36 minutes, Consultation: 12 minutes, Family          sp4 

      Intervention: 12 minutes. Total time: 60 minutes                                            

                                                                                                  

Signatures:                                                                                       

Dispatcher [employer]                           Immanuel Metz MD MD   sp4                                                  

Brendan Davidson, RN                      RN   bm8                                                  

Snoal Killian RN                              ha1                                                  

Marika Hudson RN                         al5                                                  

                                                                                                  

Corrections: (The following items were deleted from the chart)                                    

21:30 21:30 BASIC METABOLIC PANEL+C.LAB.BRZ ordered. EDMS                                     EDMS

21:30 21:30 CBC+H.LAB.BRZ ordered. EDMS                                                       EDMS

21:30 21:30 D-DIMER+COAG.LAB.BRZ ordered. EDMS                                                EDMS

21:30 21:30 HEPATIC FUNCTION+C.LAB.BRZ ordered. EDMS                                          EDMS

21: 21:30 MAGNESIUM+C.LAB.BRZ ordered. EDMS                                                 EDMS

21:30 21:30 PROBNP+C.LAB.BRZ ordered. EDMS                                                    EDMS

21:30 21:30 PROTIME (+INR)+COAG.LAB.BRZ ordered. EDMS                                         EDMS

21:30 21:30 Troponin High Sensitivity+C.LAB.BRZ ordered. EDMS                                 EDMS

21:30 21:30 Chest Single View+RAD.RAD.BRZ ordered. EDMS                                       EDMS

22:16 21:32 Home Meds: Unable to obtain; bmKvng                                                  bm8 

                                                                                             

00:10 00:10 Troponin High Sensitivity+C.LAB.BRZ ordered. EDMS                                 EDMS

03:51  22:49 [facility]'s attending MD sp4                                          bm8 

                                                                                                  

**************************************************************************************************

## 2025-02-03 NOTE — XMS REPORT
Clinical Summary



                          Created on: February 3, 2025





[place] natar[place] Yana

External Reference #: [id number]

: 1955

Sex: Male



Demographics





                                        Address             [address] [place]  [address]

 

                                        Home Phone          [phone]

 

                                        Mobile Phone        [phone]

 

                                        Email Address       [email or web address]

 

                                        Preferred Language  English

 

                                        Marital Status      

 

                                        L[employer] Affiliation Unknown

 

                                        Race                White

 

                                        Ethnic Group        Not  or Lati

no





Author





                                        Name                Unknown

 

                                        Organization        [facility]

 

                                        Address             [address]

[place]  [address]





Support





                          Name         Relationship Address      Phone

 

                          [facility] Contact Unknown      [phone]

[phone]





Care Team Providers





                                Care Team Member Name Role            Phone

 

                                Christina Whitman MD Unavailable     [phone]







Social History





                      Tobacco Use Types      Packs/Day  Years Used Date

 

                      Smoking Tobacco: Never Assessed                           

       







                                Sex and Gender Information Value           Date 

Recorded

 

                                Sex Assigned at Birth Not on file     

 

                                Legal Sex       Male            2022 2:08 

PM CST

 

                                Gender Identity Not on file     

 

                                Sexual Orientation Not on file     







Plan of Treatment





Not on file







Insurance

* Guarantor: Ismael Morrison Yana



                      Account Type Relation to Patient Date of Birth Phone      

Billing Address

 

                          Personal/Family Self         1955   





[phone] 

(Home)                                  





 [address] [place] 

[address]



                           [employer]



                                Member          Subscriber      Plan / Payer (Ef

fective 

2022-Present)

 

                                                    Name:[place] 

[place]

Member ID:[id number]

Relation to 

Subscriber:Self                         Name:PrinceIsmael natarajan Yana

Subscriber ID:[id number]

                                        Payer ID:[id number] ([employer])

Group ID:[id number]

Type:Medicare

Address:





[address] [address]





* Guarantor: Prince Ismael Millan



                      Account Type Relation to Patient Date of Birth Phone      

Billing Address

 

                          Personal/Family Self         1955   





[phone] 

(Home)                                  





 [address] [place] 

[address]



                           [employer]



                                Member          Subscriber      Plan / Payer (Ef

fective 

2022-Present)

 

                                                    Name:Ismael Morrison 

Yana

Member ID:[id number]

Relation to 

Subscriber:Self                         Name:Ismael Morrison

Subscriber ID:[id number]

                                        Payer ID:[id number] ([employer])

Group ID:[id number]

Type:Medicare

Address:





[facility] [address] [place], [place] [address]









Care Teams





                      Team Member Relationship Specialty  Start Date End Date

 

                                                    





Christina Whitman MD





NPI: [id number]





[address] [id number] [phone] (Work)





[phone] (Fax) PCP - External Referring Rheumatology    22

## 2025-02-04 VITALS — OXYGEN SATURATION: 99 %

## 2025-02-04 VITALS — DIASTOLIC BLOOD PRESSURE: 73 MMHG | SYSTOLIC BLOOD PRESSURE: 107 MMHG | TEMPERATURE: 98.7 F

## 2025-02-04 LAB
MORPHOLOGY BLD-IMP: (no result)
NEUTROPHILS NFR FLD MANUAL: 89 % (ref 40–80)
TOTAL CELLS COUNTED SPEC: 100

## 2025-02-04 NOTE — EKG
Test Date:    2025-02-04               Test Time:    00:37:53

Technician:   SUNDAY                                     

                                                     

MEASUREMENT RESULTS:                                       

Intervals:                                           

Rate:         110                                    

NE:           256                                    

QRSD:         100                                    

QT:           302                                    

QTc:          408                                    

Axis:                                                

P:                                                   

NE:           256                                    

QRS:          10                                     

T:            118                                    

                                                     

INTERPRETIVE STATEMENTS:                                       

                                                     

Sinus tachycardia with 1st degree AV block with premature atrial complexes

ST & T wave abnormality, consider lateral ischemia

Abnormal ECG

Compared to ECG 02/03/2025 21:24:58

Atrial premature complex(es) now present

First degree AV block now present

ST (T wave) deviation still present

Possible ischemia still present



Electronically Signed On 02-04-25 12:09:25 CST by Robert Jorgensen

## 2025-02-04 NOTE — EKG
Test Date:    2025-02-03               Test Time:    21:24:58

Technician:   SUNDAY                                     

                                                     

MEASUREMENT RESULTS:                                       

Intervals:                                           

Rate:         116                                    

WY:           160                                    

QRSD:         108                                    

QT:           358                                    

QTc:          497                                    

Axis:                                                

P:            113                                    

WY:           160                                    

QRS:          -11                                    

T:            93                                     

                                                     

INTERPRETIVE STATEMENTS:                                       

                                                     

Sinus tachycardia

ST & T wave abnormality, consider lateral ischemia

Abnormal ECG

Compared to ECG 08/25/2022 16:20:22

Sinus rhythm no longer present

ST (T wave) deviation still present

Possible ischemia still present



Electronically Signed On 02-04-25 12:09:43 CST by Robert Jorgensen